# Patient Record
Sex: MALE | Race: AMERICAN INDIAN OR ALASKA NATIVE
[De-identification: names, ages, dates, MRNs, and addresses within clinical notes are randomized per-mention and may not be internally consistent; named-entity substitution may affect disease eponyms.]

---

## 2022-04-30 ENCOUNTER — HOSPITAL ENCOUNTER (EMERGENCY)
Dept: HOSPITAL 5 - ED | Age: 52
LOS: 1 days | Discharge: LEFT BEFORE DISCHARGE | End: 2022-05-01
Payer: MEDICARE

## 2022-04-30 VITALS — SYSTOLIC BLOOD PRESSURE: 165 MMHG | DIASTOLIC BLOOD PRESSURE: 89 MMHG

## 2022-04-30 DIAGNOSIS — M79.641: Primary | ICD-10-CM

## 2022-04-30 DIAGNOSIS — Z53.21: ICD-10-CM

## 2022-08-12 ENCOUNTER — HOSPITAL ENCOUNTER (INPATIENT)
Dept: HOSPITAL 5 - ED | Age: 52
LOS: 14 days | Discharge: HOME HEALTH | DRG: 70 | End: 2022-08-26
Attending: INTERNAL MEDICINE | Admitting: HOSPITALIST
Payer: MEDICARE

## 2022-08-12 DIAGNOSIS — Z82.49: ICD-10-CM

## 2022-08-12 DIAGNOSIS — Z79.82: ICD-10-CM

## 2022-08-12 DIAGNOSIS — E87.5: ICD-10-CM

## 2022-08-12 DIAGNOSIS — E86.0: ICD-10-CM

## 2022-08-12 DIAGNOSIS — Z20.822: ICD-10-CM

## 2022-08-12 DIAGNOSIS — Z99.2: ICD-10-CM

## 2022-08-12 DIAGNOSIS — E43: ICD-10-CM

## 2022-08-12 DIAGNOSIS — G93.41: Primary | ICD-10-CM

## 2022-08-12 DIAGNOSIS — E87.2: ICD-10-CM

## 2022-08-12 DIAGNOSIS — E87.0: ICD-10-CM

## 2022-08-12 DIAGNOSIS — I12.0: ICD-10-CM

## 2022-08-12 DIAGNOSIS — E11.22: ICD-10-CM

## 2022-08-12 DIAGNOSIS — Z91.19: ICD-10-CM

## 2022-08-12 DIAGNOSIS — D63.1: ICD-10-CM

## 2022-08-12 DIAGNOSIS — G40.909: ICD-10-CM

## 2022-08-12 DIAGNOSIS — N18.6: ICD-10-CM

## 2022-08-12 PROCEDURE — 80074 ACUTE HEPATITIS PANEL: CPT

## 2022-08-12 PROCEDURE — 86900 BLOOD TYPING SEROLOGIC ABO: CPT

## 2022-08-12 PROCEDURE — 80061 LIPID PANEL: CPT

## 2022-08-12 PROCEDURE — 82962 GLUCOSE BLOOD TEST: CPT

## 2022-08-12 PROCEDURE — 85014 HEMATOCRIT: CPT

## 2022-08-12 PROCEDURE — 82550 ASSAY OF CK (CPK): CPT

## 2022-08-12 PROCEDURE — 85025 COMPLETE CBC W/AUTO DIFF WBC: CPT

## 2022-08-12 PROCEDURE — 82140 ASSAY OF AMMONIA: CPT

## 2022-08-12 PROCEDURE — 86920 COMPATIBILITY TEST SPIN: CPT

## 2022-08-12 PROCEDURE — 86850 RBC ANTIBODY SCREEN: CPT

## 2022-08-12 PROCEDURE — 84484 ASSAY OF TROPONIN QUANT: CPT

## 2022-08-12 PROCEDURE — 93005 ELECTROCARDIOGRAM TRACING: CPT

## 2022-08-12 PROCEDURE — 80048 BASIC METABOLIC PNL TOTAL CA: CPT

## 2022-08-12 PROCEDURE — 86901 BLOOD TYPING SEROLOGIC RH(D): CPT

## 2022-08-12 PROCEDURE — 85610 PROTHROMBIN TIME: CPT

## 2022-08-12 PROCEDURE — 36415 COLL VENOUS BLD VENIPUNCTURE: CPT

## 2022-08-12 PROCEDURE — 80320 DRUG SCREEN QUANTALCOHOLS: CPT

## 2022-08-12 PROCEDURE — P9016 RBC LEUKOCYTES REDUCED: HCPCS

## 2022-08-12 PROCEDURE — G0480 DRUG TEST DEF 1-7 CLASSES: HCPCS

## 2022-08-12 PROCEDURE — 71045 X-RAY EXAM CHEST 1 VIEW: CPT

## 2022-08-12 PROCEDURE — 85018 HEMOGLOBIN: CPT

## 2022-08-12 PROCEDURE — 80053 COMPREHEN METABOLIC PANEL: CPT

## 2022-08-12 PROCEDURE — 83735 ASSAY OF MAGNESIUM: CPT

## 2022-08-12 PROCEDURE — U0003 INFECTIOUS AGENT DETECTION BY NUCLEIC ACID (DNA OR RNA); SEVERE ACUTE RESPIRATORY SYNDROME CORONAVIRUS 2 (SARS-COV-2) (CORONAVIRUS DISEASE [COVID-19]), AMPLIFIED PROBE TECHNIQUE, MAKING USE OF HIGH THROUGHPUT TECHNOLOGIES AS DESCRIBED BY CMS-2020-01-R: HCPCS

## 2022-08-12 PROCEDURE — 84100 ASSAY OF PHOSPHORUS: CPT

## 2022-08-12 PROCEDURE — 70450 CT HEAD/BRAIN W/O DYE: CPT

## 2022-08-13 LAB
ALBUMIN SERPL-MCNC: 2.6 G/DL (ref 3.9–5)
ALT SERPL-CCNC: 15 UNITS/L (ref 7–56)
BASOPHILS # (AUTO): 0 K/MM3 (ref 0–0.1)
BASOPHILS NFR BLD AUTO: 0.3 % (ref 0–1.8)
BUN SERPL-MCNC: 132 MG/DL (ref 9–20)
BUN SERPL-MCNC: 242 MG/DL (ref 9–20)
BUN/CREAT SERPL: 7 %
BUN/CREAT SERPL: 8 %
CALCIUM SERPL-MCNC: 10.5 MG/DL (ref 8.4–10.2)
CALCIUM SERPL-MCNC: 9.2 MG/DL (ref 8.4–10.2)
EOSINOPHIL # BLD AUTO: 0 K/MM3 (ref 0–0.4)
EOSINOPHIL NFR BLD AUTO: 0.1 % (ref 0–4.3)
HCT VFR BLD CALC: 24 % (ref 35.5–45.6)
HDLC SERPL-MCNC: 39 MG/DL (ref 40–59)
HEMOLYSIS INDEX: 0
HEMOLYSIS INDEX: 0
HGB BLD-MCNC: 7.8 GM/DL (ref 11.8–15.2)
INR PPP: 1.23 (ref 0.87–1.13)
LYMPHOCYTES # BLD AUTO: 0.3 K/MM3 (ref 1.2–5.4)
LYMPHOCYTES NFR BLD AUTO: 2.1 % (ref 13.4–35)
MCHC RBC AUTO-ENTMCNC: 32 % (ref 32–34)
MCV RBC AUTO: 93 FL (ref 84–94)
MONOCYTES # (AUTO): 1.1 K/MM3 (ref 0–0.8)
MONOCYTES % (AUTO): 8.3 % (ref 0–7.3)
PLATELET # BLD: 295 K/MM3 (ref 140–440)
RBC # BLD AUTO: 2.58 M/MM3 (ref 3.65–5.03)

## 2022-08-13 PROCEDURE — 5A1D70Z PERFORMANCE OF URINARY FILTRATION, INTERMITTENT, LESS THAN 6 HOURS PER DAY: ICD-10-PCS | Performed by: INTERNAL MEDICINE

## 2022-08-13 RX ADMIN — HEPARIN SODIUM SCH UNIT: 5000 INJECTION, SOLUTION INTRAVENOUS; SUBCUTANEOUS at 21:53

## 2022-08-13 RX ADMIN — Medication SCH ML: at 21:54

## 2022-08-13 NOTE — CONSULTATION
History of Present Illness





- Reason for Consult


Consult date: 08/13/22


end stage renal disease





- History of Present Illness





51yr M admitted with AMS, oriented to name only, only nods to answer questions 

but won't respond to commands. Does not know when he last had dialysis & unable 

to say his Clinic or Nephrologist








Past History


Past Medical History: ESRD, other (No further info available at this time & no 

family members around)





Medications and Allergies


                                    Allergies











Allergy/AdvReac Type Severity Reaction Status Date / Time


 


No Known Allergies Allergy   Unverified 06/07/22 18:10











                                Home Medications











 Medication  Instructions  Recorded  Confirmed  Last Taken  Type


 


Aspirin EC [Halfprin EC] 81 mg PO QDAY #30 tablet. 06/13/22  Unknown Rx


 


Metoprolol [Lopressor TAB] 100 mg PO BID #60 tablet 06/13/22  Unknown Rx


 


levETIRAcetam [Keppra TAB] 500 mg PO BID #60 tablet 06/13/22  Unknown Rx











Active Meds: 


Active Medications





Sodium Chloride (Nacl 0.9%)  100 mls @ 999 mls/hr IV EUSEBIA PRN


   PRN Reason: Hypotension











Review of Systems


ROS unobtainable: due to mental status





Exam





- Vital Signs


Vital signs: 


                                   Vital Signs











Temp Pulse Resp BP Pulse Ox


 


 98 F   99 H  18   165/89   100 


 


 08/12/22 23:33  08/12/22 23:33  08/12/22 23:33  08/12/22 23:33  08/12/22 23:33














- General Appearance


General appearance: other (Awake & alert but remains mute & only nods to answer 

questions)


EENT: PERRL, hearing intact


Neck: Present: neck supple.  Absent: JVD/HJR


Respiratory: Other (Good air entry)


Heart: regular, S1S2


Gastrointestinal: Present: other (Soft).  Absent: tenderness


Integumentary: no rash, warm and dry





Results





- Lab Results





                                 08/13/22 01:28





                                 08/13/22 01:28


                             Most recent lab results











Calcium  10.5 mg/dL (8.4-10.2)  H  08/13/22  01:28    














Assessment and Plan





ESRD - Highly elevated BUN/Cr but pt alert & no evidence of Uremic frost





Electrolyte Imbalance - HyperK, HyperNa





Dehydration 





Altered Mental Status - Possibly related to HyperNa but r/o illicit Drug use 





Plan:





HD today via Lt arm AVG with +Bruit & thrill. Discussed with HD Nurse for slow 

HD with adjusted  electrolytes to avoid Dialysis dysequilibrium





Low K, low Na, low Ca baths on HD. Kayexalate po





Hypotonic IVF & f/u labs & clinical status in am





Thanks, will f/u with you

## 2022-08-13 NOTE — EMERGENCY DEPARTMENT REPORT
ED Altered Mental Status HPI





- General


Chief Complaint: Altered Mental Status


Stated Complaint: AMS


PUI?: No


Time Seen by Provider: 08/13/22 00:42


Source: family, EMS


Mode of arrival: Stretcher


Limitations: Altered Mental Status





- History of Present Illness


Initial Comments: 





Family called to report that pt is not acting right. Oriented to name only. Does

not know when last dialysis was. Was given 1 amp BICARB PTA.


ps is 51 years old with CRF , family noted not acting like himself , they dont 

know when he was last dilaised, 


MD Complaint: altered mental status, confusion, decreased responsiveness


-: Gradual, days(s)


Severity: severe


Consistency of Symptoms: getting worse


Context: history of similar presen


Associated Symptoms: denies: denies other symptoms, chest pain





- Related Data


                                  Previous Rx's











 Medication  Instructions  Recorded  Last Taken  Type


 


Aspirin EC [Halfprin EC] 81 mg PO QDAY #30 tablet. 06/13/22 Unknown Rx


 


Metoprolol [Lopressor TAB] 100 mg PO BID #60 tablet 06/13/22 Unknown Rx


 


levETIRAcetam [Keppra TAB] 500 mg PO BID #60 tablet 06/13/22 Unknown Rx











                                    Allergies











Allergy/AdvReac Type Severity Reaction Status Date / Time


 


No Known Allergies Allergy   Unverified 06/07/22 18:10














ED Review of Systems


ROS: 


Stated complaint: AMS


Other details as noted in HPI





Constitutional: denies: chills, fever


Eyes: denies: eye pain, eye discharge, vision change


ENT: denies: ear pain, throat pain


Respiratory: denies: cough, shortness of breath, wheezing


Cardiovascular: denies: chest pain, palpitations


Endocrine: no symptoms reported


Gastrointestinal: denies: abdominal pain, nausea, diarrhea


Genitourinary: denies: urgency, dysuria


Musculoskeletal: denies: back pain, joint swelling, arthralgia


Skin: denies: rash, lesions


Neurological: denies: headache, weakness, paresthesias


Psychiatric: denies: anxiety, depression


Hematological/Lymphatic: denies: easy bleeding, easy bruising





ED Past Medical Hx





- Past Medical History


Previous Medical History?: Yes


Hx Hypertension: Yes


Hx Diabetes: Yes


Hx Renal Disease: Yes (On Dialysis)





- Surgical History


Past Surgical History?: No





- Social History


Smoking Status: Never Smoker


Substance Use Type: None





- Medications


Home Medications: 


                                Home Medications











 Medication  Instructions  Recorded  Confirmed  Last Taken  Type


 


Aspirin EC [Halfprin EC] 81 mg PO QDAY #30 tablet. 06/13/22  Unknown Rx


 


Metoprolol [Lopressor TAB] 100 mg PO BID #60 tablet 06/13/22  Unknown Rx


 


levETIRAcetam [Keppra TAB] 500 mg PO BID #60 tablet 06/13/22  Unknown Rx














ED Physical Exam





- General


Limitations: Altered Mental Status


General appearance: lethargic, in distress





- Head


Head exam: Present: atraumatic, normocephalic





- Eye


Eye exam: Present: normal appearance





- ENT


ENT exam: Present: mucous membranes moist





- Neck


Neck exam: Present: normal inspection





- Respiratory


Respiratory exam: Present: normal lung sounds bilaterally.  Absent: respiratory 

distress





- Cardiovascular


Cardiovascular Exam: Present: normal rhythm, tachycardia.  Absent: systolic 

murmur, diastolic murmur, rubs, gallop





- GI/Abdominal


GI/Abdominal exam: Present: soft, normal bowel sounds





- Rectal


Rectal exam: Present: deferred





- Extremities Exam


Extremities exam: Present: normal inspection





- Back Exam


Back exam: Present: normal inspection





- Skin


Skin exam: Present: warm, dry, intact, normal color.  Absent: rash





ED Course





                                   Vital Signs











  08/12/22





  23:33


 


Temperature 98 F


 


Pulse Rate 99 H


 


Respiratory 18





Rate 


 


Blood Pressure 165/89


 


O2 Sat by Pulse 100





Oximetry 














- Lab Data


Result diagrams: 


                                 08/13/22 01:28





                                 08/13/22 01:28





                                   Lab Results











  08/13/22 08/13/22 08/13/22 Range/Units





  01:28 01:28 01:28 


 


WBC  13.8 H    (4.5-11.0)  K/mm3


 


RBC  2.58 L    (3.65-5.03)  M/mm3


 


Hgb  7.8 L    (11.8-15.2)  gm/dl


 


Hct  24.0 L    (35.5-45.6)  %


 


MCV  93    (84-94)  fl


 


MCH  30    (28-32)  pg


 


MCHC  32    (32-34)  %


 


RDW  15.5 H    (13.2-15.2)  %


 


Plt Count  295    (140-440)  K/mm3


 


Lymph % (Auto)  2.1 L    (13.4-35.0)  %


 


Mono % (Auto)  8.3 H    (0.0-7.3)  %


 


Eos % (Auto)  0.1    (0.0-4.3)  %


 


Baso % (Auto)  0.3    (0.0-1.8)  %


 


Lymph # (Auto)  0.3 L    (1.2-5.4)  K/mm3


 


Mono # (Auto)  1.1 H    (0.0-0.8)  K/mm3


 


Eos # (Auto)  0.0    (0.0-0.4)  K/mm3


 


Baso # (Auto)  0.0    (0.0-0.1)  K/mm3


 


Seg Neutrophils %  89.2 H    (40.0-70.0)  %


 


Seg Neutrophils #  12.3 H    (1.8-7.7)  K/mm3


 


PT   17.0 H   (12.2-14.9)  Sec.


 


INR   1.23 H   (0.87-1.13)  


 


Sodium    152 H  (137-145)  mmol/L


 


Potassium    6.9 H*  (3.6-5.0)  mmol/L


 


Chloride    97.9 L  ()  mmol/L


 


Carbon Dioxide    21 L  (22-30)  mmol/L


 


Anion Gap    40  mmol/L


 


BUN    242 H  (9-20)  mg/dL


 


Creatinine    32.1 H  (0.8-1.3)  mg/dL


 


Estimated GFR    2  ml/min


 


BUN/Creatinine Ratio    8  %


 


Glucose    112 H  ()  mg/dL


 


Calcium    10.5 H  (8.4-10.2)  mg/dL


 


Total Bilirubin    0.30  (0.1-1.2)  mg/dL


 


AST    19  (5-40)  units/L


 


ALT    15  (7-56)  units/L


 


Alkaline Phosphatase    63  ()  units/L


 


Total Creatine Kinase    1147 H  ()  units/L


 


Troponin T    0.933 H*  (0.00-0.029)  ng/mL


 


Total Protein    7.1  (6.3-8.2)  g/dL


 


Albumin    2.6 L  (3.9-5)  g/dL


 


Albumin/Globulin Ratio    0.6  %


 


Salicylates     (2.8-20.0)  mg/dL


 


Acetaminophen     (10.0-30.0)  ug/mL


 


Plasma/Serum Alcohol     (0-0.07)  %














  08/13/22 08/13/22 08/13/22 Range/Units





  01:28 01:28 01:28 


 


WBC     (4.5-11.0)  K/mm3


 


RBC     (3.65-5.03)  M/mm3


 


Hgb     (11.8-15.2)  gm/dl


 


Hct     (35.5-45.6)  %


 


MCV     (84-94)  fl


 


MCH     (28-32)  pg


 


MCHC     (32-34)  %


 


RDW     (13.2-15.2)  %


 


Plt Count     (140-440)  K/mm3


 


Lymph % (Auto)     (13.4-35.0)  %


 


Mono % (Auto)     (0.0-7.3)  %


 


Eos % (Auto)     (0.0-4.3)  %


 


Baso % (Auto)     (0.0-1.8)  %


 


Lymph # (Auto)     (1.2-5.4)  K/mm3


 


Mono # (Auto)     (0.0-0.8)  K/mm3


 


Eos # (Auto)     (0.0-0.4)  K/mm3


 


Baso # (Auto)     (0.0-0.1)  K/mm3


 


Seg Neutrophils %     (40.0-70.0)  %


 


Seg Neutrophils #     (1.8-7.7)  K/mm3


 


PT     (12.2-14.9)  Sec.


 


INR     (0.87-1.13)  


 


Sodium     (137-145)  mmol/L


 


Potassium     (3.6-5.0)  mmol/L


 


Chloride     ()  mmol/L


 


Carbon Dioxide     (22-30)  mmol/L


 


Anion Gap     mmol/L


 


BUN     (9-20)  mg/dL


 


Creatinine     (0.8-1.3)  mg/dL


 


Estimated GFR     ml/min


 


BUN/Creatinine Ratio     %


 


Glucose     ()  mg/dL


 


Calcium     (8.4-10.2)  mg/dL


 


Total Bilirubin     (0.1-1.2)  mg/dL


 


AST     (5-40)  units/L


 


ALT     (7-56)  units/L


 


Alkaline Phosphatase     ()  units/L


 


Total Creatine Kinase     ()  units/L


 


Troponin T     (0.00-0.029)  ng/mL


 


Total Protein     (6.3-8.2)  g/dL


 


Albumin     (3.9-5)  g/dL


 


Albumin/Globulin Ratio     %


 


Salicylates  < 0.3 L    (2.8-20.0)  mg/dL


 


Acetaminophen   5.0 L   (10.0-30.0)  ug/mL


 


Plasma/Serum Alcohol    < 0.01  (0-0.07)  %














- EKG Data


-: EKG Interpreted by Me


EKG shows normal: sinus rhythm


Rate: tachycardia





- Radiology Data


Radiology results: report reviewed, image reviewed





- Medical Decision Making





work up showed hyperkalemia and uremia encephaolthy , protocol for hyperkalemia 

apliued, spoke with dr downs , will get him dilaised rigth away, will admit 


Critical Care Time: Yes


Critical care time in (mins) excluding proc time.: 65


Critical care attestation.: 


If time is entered above; I have spent that time in minutes in the direct care 

of this critically ill patient, excluding procedure time.








ED Disposition


Clinical Impression: 


 Uremic encephalopathy, AMS (altered mental status), Hyperkalemia





Disposition: 09 ADMITTED AS INPATIENT


Is pt being admited?: Yes


Does the pt Need Aspirin: No


Condition: Critical


Referrals: 


ARIES HARRINGTON MD [Primary Care Provider] - 3-5 Days

## 2022-08-13 NOTE — XRAY REPORT
CHEST 1 VIEW 8/12/2022 11:59 PM



INDICATION / CLINICAL INFORMATION: Altered Mental Status.



COMPARISON: 6/10/2022



FINDINGS:



Diffuse bilateral pulmonary opacities of increased since prior examination suggesting diffuse edema/p
neumonia. Left upper lung nodular density and opacity is also seen which may be slightly increased. F
ollow-up with CT recommended as previously described.



Signer Name: Andrea Greer MD 

Signed: 8/13/2022 1:30 AM

Workstation Name: [email or web address]-

## 2022-08-13 NOTE — CAT SCAN REPORT
CT HEAD WITHOUT CONTRAST



INDICATION / CLINICAL INFORMATION:

Altered Mental Status.



TECHNIQUE:

All CT scans at this location are performed using CT dose reduction for ALARA by means of automated e
xposure control. 



COMPARISON:

6/12/2022



FINDINGS:



There is a prominent area of low attenuation within the left frontal white matter consistent with are
a of prior ischemic change. Additional areas of low-attenuation scattered throughout the periventricu
lar and central white matter. Additional area of low-attenuation with in the left posterior occipital
 and periventricular white matter surrounding the occipital horn suggests prior ischemic change. Mult
iple areas of encephalomalacia are seen. Diffuse cerebral atrophy is identified. No acute hemorrhage 
is seen.. No midline shift or mass effect. Tiny area of punctate density is seen within the left post
erior parieto-occipital lobe on axial image 21



ADDITIONAL FINDINGS: None.



IMPRESSION:

1. Multiple areas of encephalomalacia with diffuse areas of prior ischemic change. Diffuse periventri
cular central white matter areas of low-attenuation suggesting chronic small vessel disease.

2. Punctate area of increased density in the left prior occipital lobe best seen on axial image 21. T
his could represent focal area of calcification however a small punctate images not completely exclud
ed. This is not as well-seen on the prior exam.



Called to Dr Humphrey [id number]



Signer Name: Andrea Greer MD 

Signed: 8/13/2022 1:53 AM

Workstation Name: [email or web address]-

## 2022-08-13 NOTE — EVENT NOTE
In reviewing chart, patient seen by Dr. Rodríguez on last visit- will change 

consult to him.  Did speak with Dr. Rodríguez to confirm patient is under his care.

 STAT HD orders placed given severity of symptoms early this AM, is currently on

HD.  Further management per Dr. Rodríguez.

## 2022-08-14 LAB
ALBUMIN SERPL-MCNC: 2.8 G/DL (ref 3.9–5)
ALT SERPL-CCNC: 17 UNITS/L (ref 7–56)
BASOPHILS # (AUTO): 0 K/MM3 (ref 0–0.1)
BASOPHILS NFR BLD AUTO: 0.4 % (ref 0–1.8)
BUN SERPL-MCNC: 138 MG/DL (ref 9–20)
BUN/CREAT SERPL: 7 %
CALCIUM SERPL-MCNC: 9.5 MG/DL (ref 8.4–10.2)
EOSINOPHIL # BLD AUTO: 0.1 K/MM3 (ref 0–0.4)
EOSINOPHIL NFR BLD AUTO: 1 % (ref 0–4.3)
HCT VFR BLD CALC: 19.9 % (ref 35.5–45.6)
HEMOLYSIS INDEX: 0
HGB BLD-MCNC: 6.8 GM/DL (ref 11.8–15.2)
LYMPHOCYTES # BLD AUTO: 0.3 K/MM3 (ref 1.2–5.4)
LYMPHOCYTES NFR BLD AUTO: 3.5 % (ref 13.4–35)
MCHC RBC AUTO-ENTMCNC: 34 % (ref 32–34)
MCV RBC AUTO: 92 FL (ref 84–94)
MONOCYTES # (AUTO): 0.8 K/MM3 (ref 0–0.8)
MONOCYTES % (AUTO): 8.7 % (ref 0–7.3)
PLATELET # BLD: 248 K/MM3 (ref 140–440)
RBC # BLD AUTO: 2.16 M/MM3 (ref 3.65–5.03)

## 2022-08-14 PROCEDURE — 30243N1 TRANSFUSION OF NONAUTOLOGOUS RED BLOOD CELLS INTO CENTRAL VEIN, PERCUTANEOUS APPROACH: ICD-10-PCS | Performed by: INTERNAL MEDICINE

## 2022-08-14 RX ADMIN — INSULIN LISPRO SCH UNIT: 100 INJECTION, SOLUTION INTRAVENOUS; SUBCUTANEOUS at 21:21

## 2022-08-14 RX ADMIN — ASPIRIN SCH MG: 81 TABLET, COATED ORAL at 10:12

## 2022-08-14 RX ADMIN — INSULIN LISPRO SCH: 100 INJECTION, SOLUTION INTRAVENOUS; SUBCUTANEOUS at 12:10

## 2022-08-14 RX ADMIN — SEVELAMER CARBONATE SCH: 800 TABLET, FILM COATED ORAL at 18:10

## 2022-08-14 RX ADMIN — Medication SCH ML: at 10:13

## 2022-08-14 RX ADMIN — METOPROLOL TARTRATE SCH MG: 100 TABLET, FILM COATED ORAL at 21:17

## 2022-08-14 RX ADMIN — SEVELAMER CARBONATE SCH: 800 TABLET, FILM COATED ORAL at 13:03

## 2022-08-14 RX ADMIN — METOPROLOL TARTRATE SCH MG: 100 TABLET, FILM COATED ORAL at 10:12

## 2022-08-14 RX ADMIN — SEVELAMER CARBONATE SCH MG: 800 TABLET, FILM COATED ORAL at 18:07

## 2022-08-14 RX ADMIN — HEPARIN SODIUM SCH UNIT: 5000 INJECTION, SOLUTION INTRAVENOUS; SUBCUTANEOUS at 10:13

## 2022-08-14 RX ADMIN — Medication SCH ML: at 21:17

## 2022-08-14 RX ADMIN — HEPARIN SODIUM SCH UNIT: 5000 INJECTION, SOLUTION INTRAVENOUS; SUBCUTANEOUS at 21:16

## 2022-08-14 RX ADMIN — INSULIN LISPRO SCH: 100 INJECTION, SOLUTION INTRAVENOUS; SUBCUTANEOUS at 20:25

## 2022-08-14 RX ADMIN — INSULIN LISPRO SCH: 100 INJECTION, SOLUTION INTRAVENOUS; SUBCUTANEOUS at 10:14

## 2022-08-14 NOTE — PROGRESS NOTE
Assessment and Plan





ESRD - Tolerated cautious HD yesterday, BUN/Cr improved from 242/32.1 to 

138/19.2, will f/u today & plan for next HD in am





Electrolyte Imbalance - Improved HyperNa from 152 to 147, resolved HyperK. En

courage free water ingestion. Low Na bath on HD 





Metab - Add Phos binder. Low Ca bath on HD





Altered Mental Status - Much improved, continue f/u








Subjective


Date of service: 08/14/22


Interval history: 





Pt more alert, lucid & verbally responsive. Knows he gets HD q mwf but has not 

been for a while





Objective





- Vital Signs


Vital signs: 


                               Vital Signs - 12hr











  08/14/22 08/14/22 08/14/22





  00:00 01:00 04:00


 


Temperature 99.8 F H  


 


Pulse Rate [  106 H 90





From Monitor]   


 


Blood Pressure   


 


O2 Sat by Pulse  100 100





Oximetry   














  08/14/22 08/14/22 08/14/22





  04:36 07:37 10:12


 


Temperature 97.5 F L 98.7 F 


 


Pulse Rate [   





From Monitor]   


 


Blood Pressure   141/70


 


O2 Sat by Pulse   





Oximetry   














- General Appearance


General appearance: other (Awake & now verbally responsive)


EENT: PERRL, hearing intact


Neck: no JVD


Respiratory: Present: Other (Good air entry)


Cardiology: regular, S1S2


Gastrointestinal: normal


Neurologic: alert and oriented x3





- Lab





                                 08/14/22 07:22





                                 08/14/22 07:22


                             Most recent lab results











Calcium  9.5 mg/dL (8.4-10.2)   08/14/22  07:22    


 


Phosphorus  10.10 mg/dL (2.5-4.5)  H  08/14/22  07:22    


 


Magnesium  2.20 mg/dL (1.7-2.3)   08/14/22  07:22    














Medications & Allergies





- Medications


Allergies/Adverse Reactions: 


                                    Allergies





No Known Allergies Allergy (Unverified 06/07/22 18:10)


   








Home Medications: 


                                Home Medications











 Medication  Instructions  Recorded  Confirmed  Last Taken  Type


 


Aspirin EC [Halfprin EC] 81 mg PO QDAY #30 tablet. 06/13/22  Unknown Rx


 


Metoprolol [Lopressor TAB] 100 mg PO BID #60 tablet 06/13/22  Unknown Rx


 


levETIRAcetam [Keppra TAB] 500 mg PO BID #60 tablet 06/13/22  Unknown Rx











Active Medications: 














Generic Name Dose Route Start Last Admin





  Trade Name Freq  PRN Reason Stop Dose Admin


 


Acetaminophen  650 mg  08/13/22 19:48 





  Acetaminophen 325 Mg Tab  PO  





  Q4H PRN  





  Pain MILD(1-3)/Fever >100.5/HA  


 


Aspirin  81 mg  08/14/22 10:00  08/14/22 10:12





  Aspirin Ec 81 Mg Tab  PO   81 mg





  QDAY [facility]   Administration


 


Heparin Sodium (Porcine)  5,000 unit  08/13/22 22:00  08/14/22 10:13





  Heparin 5,000 Unit/1 Ml Vial  SUB-Q   5,000 unit





  Q12HR [facility]   Administration


 


Hydromorphone HCl  0.5 mg  08/13/22 19:48 





  Hydromorphone 0.5 Mg/0.5 Ml Inj  IV  





  Q3H PRN  





  Pain , Severe (7-10)  


 


Sodium Chloride  100 mls @ 999 mls/hr  08/13/22 04:18 





  Nacl 0.9%  IV  





  EUSEBIA PRN  





  Hypotension  


 


Sodium Chloride  500 mls @ 0 mls/hr  08/14/22 09:00 





  Nacl 0.9% 500 Ml  IV  08/14/22 19:00 





  ONCE@0900 [facility]  





  As Directed  


 


Insulin Human Lispro  0 unit  08/14/22 07:30  08/14/22 10:14





  Insulin Lispro 100 Unit/Ml  SUB-Q   Not Given





  [facility]  





  Protocol  


 


Levetiracetam  500 mg  08/14/22 10:00  08/14/22 10:12





  Levetiracetam 500 Mg Tab  PO   500 mg





  BID [facility]   Administration


 


Metoclopramide HCl  10 mg  08/13/22 19:48 





  Metoclopramide 10 Mg/2 Ml Inj  IV  





  Q6H PRN  





  Nausea And Vomiting  


 


Metoprolol Tartrate  100 mg  08/14/22 10:00  08/14/22 10:12





  Metoprolol Tartrate 100 Mg Tab  PO   100 mg





  BID [facility]   Administration


 


Morphine Sulfate  2 mg  08/13/22 19:48 





  Morphine 2 Mg/1 Ml Inj  IV  





  Q4H PRN  





  Pain, Moderate (4-6)  


 


Ondansetron HCl  4 mg  08/13/22 19:48 





  Ondansetron 4 Mg/2 Ml Inj  IV  





  Q8H PRN  





  Nausea And Vomiting  


 


Oxycodone/Acetaminophen  1 tab  08/13/22 19:48 





  Oxycodone /Acetaminophen 5-325mg Tab  PO  





  Q6H PRN  





  Pain, Moderate (4-6)  


 


Sodium Chloride  10 ml  08/13/22 22:00  08/14/22 10:13





  Sodium Chloride 0.9% 10 Ml Flush Syringe  IV   10 ml





  BID [facility]   Administration


 


Sodium Chloride  10 ml  08/13/22 19:48 





  Sodium Chloride 0.9% 10 Ml Flush Syringe  IV  





  PRN PRN  





  LINE FLUSH

## 2022-08-14 NOTE — PROGRESS NOTE
Assessment and Plan


Assessment and plan: 


History of present illness: 


51-year-old  seizure disorder hypertension type 2 diabetes and end-stage 

renal disease on hemodialysis presents with lethargy and confusion.  Family 

brought him to the emergency room stating that he is confused not oriented.  His

last known dialysis is not known.  Improvement on fluids.  No fever or chills.


No seizures.  Noncompliant with his medications.





hospital course:


8/14: AOx4 on bedside encounter. Patient stated he has now missed several 

dialysis sessions. He goes to [facility] by [place] per patient. will place 

CM consultation to ensure patient still has chair. Anticipate d/c tomorrow.





Assessment and Plan:


(1) Acute metabolic encephalopathy


Current Visit: Yes   Status: Acute   


Plan to address problem: 


Secondary to severe uremia.  His creatinine is 32.1 and BUN is 242.  Family does

not know how many days he has missed his hemodialysis.  I tried to get 

information whether he lives alone.  Could not reach anybody.  Patient needs m

ultiple hemodialysis treatments to bring the creatinine down to reasonable 

levels.  Nephrology consulted.  Patient to get emergent hemodialysis.  Today.


Patient to be counseled about compliance once he  is more alert and oriented.








(2) Hyperkalemia


Current Visit: Yes   Status: Acute   


Plan to address problem: 


Treated in the emergency room and patient going for hemodialysis


Recheck potassium level








(3) Metabolic acidosis


Current Visit: Yes   Status: Acute   


Plan to address problem: 


Secondary to severe uremia.








(4) Seizure disorder


Current Visit: Yes   Status: Chronic   


Plan to address problem: 


Continue Keppra.








(5) T2DM (type 2 diabetes mellitus)


Current Visit: Yes   Status: Chronic   


Qualifiers: 


   Diabetes mellitus long term insulin use: unspecified long term insulin use 

status   Chronic kidney disease stage: on chronic dialysis 


Plan to address problem: 


Coverage for now








(6) ESRD needing dialysis


Current Visit: Yes   Status: Acute   


Plan to address problem: 


Emergent hemodialysis


Nephrology consult


Patient to be counseled about compliance








(7) Anemia


Current Visit: No   Status: Chronic   


Qualifiers: 


   Anemia type: due to chronic kidney disease 


Plan to address problem: 


Secondary to chronic kidney disease


Epogen as per nephrology








(8) Hypertension


Current Visit: Yes   Status: Chronic   


Qualifiers: 


   Hypertension type: primary hypertension   Qualified Code(s): I10 - Essential 

(primary) hypertension   


Plan to address problem: 


Continue antihypertensives








(9) Malnutrition


Current Visit: Yes   Status: Chronic   


Qualifiers: 


   Malnutrition type: protein-calorie malnutrition   Protein-calorie 

malnutrition severity: severe   Qualified Code(s): E43 - Unspecified severe pr

otein-calorie malnutrition   


Plan to address problem: 


Dietary supplements initiated


Dietitian consult requested








(10) DVT prophylaxis


Current Visit: Yes   Status: Acute   


Plan to address problem: 


On heparin and GI prophylaxis








(11) Advance care planning


Current Visit: Yes   Status: Acute   


Plan to address problem: 


Disease education conducted, care plan discussed, diagnosis discussed and p

rognosis discussed.  Patient acknowledged understanding with care plan.  +30 

minutes.





The high probability of a clinically significant, sudden or life threatening 

deterioration of the [multi] system(s) required my full and direct attention, 

intervention and personal management. The aggregate critical care time was [60] 

minutes. This time is in addition to time spent performing reported procedures 

but includes the following: 





[x] Data Review and interpretation 





[x] Patient assessment and monitoring of vital signs 





[x] Documentation 





[x] Medication orders and management





History


Interval history: 





No acute complaints on bedside encounter.





Hospitalist Physical





- Physical exam


Narrative exam: 





Physical Exam:


VITAL SIGNS:  Reviewed.    


GENERAL:  The patient appears normally developed, Vital signs as documented.


HEAD:  No signs of head trauma.


EYES:  Pupils are equal.  Extraocular motions intact.  


EARS:  Hearing grossly intact.


MOUTH:  Oropharynx is normal. 


NECK:  No adenopathy, no JVD.  


CHEST:  Chest with clear breath sounds bilaterally.  No wheezes, rales, or 

rhonchi.  


CARDIAC:  Regular rate and rhythm.  S1 and S2, without murmurs, gallops, or 

rubs.


VASCULAR:  No Edema.  Peripheral pulses normal and equal in all extremities.


ABDOMEN:  Soft, non tender and non distended.  No   rebound or guarding, and no 

masses palpated.   Bowel Sounds normal.


MUSCULOSKELETAL:  Good range of motion of all major joints. Extremities without 

clubbing, cyanosis or edema.  


NEUROLOGIC EXAM:  Alert and oriented x 4. no focal sensory or strength deficits.

  


PSYCHIATRIC:  Mood normal.


SKIN:  detail exam as documented in skin assessment





- Constitutional


Vitals: 


                                        











Temp Pulse Resp BP Pulse Ox


 


 98.7 F   90   26 H  112/71   100 


 


 08/14/22 07:37  08/14/22 04:00  08/13/22 18:00  08/13/22 18:00  08/14/22 04:00











General appearance: Present: no acute distress, well-nourished





HEART Score





- HEART Score


Troponin: 


                                        











Troponin T  0.933 ng/mL (0.00-0.029)  H*  08/13/22  01:28    














Results





- Labs


CBC & Chem 7: 


                                 08/14/22 07:22





                                 08/14/22 07:22


Labs: 


                             Laboratory Last Values











WBC  9.1 K/mm3 (4.5-11.0)   08/14/22  07:22    


 


RBC  2.16 M/mm3 (3.65-5.03)  L  08/14/22  07:22    


 


Hgb  6.8 gm/dl (11.8-15.2)  L  08/14/22  07:22    


 


Hct  19.9 % (35.5-45.6)  L*  08/14/22  07:22    


 


MCV  92 fl (84-94)   08/14/22  07:22    


 


MCH  31 pg (28-32)   08/14/22  07:22    


 


MCHC  34 % (32-34)   08/14/22  07:22    


 


RDW  15.0 % (13.2-15.2)   08/14/22 07:22    


 


Plt Count  248 K/mm3 (140-440)   08/14/22  07:22    


 


Lymph % (Auto)  3.5 % (13.4-35.0)  L  08/14/22  07:22    


 


Mono % (Auto)  8.7 % (0.0-7.3)  H  08/14/22  07:22    


 


Eos % (Auto)  1.0 % (0.0-4.3)   08/14/22  07:22    


 


Baso % (Auto)  0.4 % (0.0-1.8)   08/14/22 07:22    


 


Lymph # (Auto)  0.3 K/mm3 (1.2-5.4)  L  08/14/22 07:22    


 


Mono # (Auto)  0.8 K/mm3 (0.0-0.8)   08/14/22 07:22    


 


Eos # (Auto)  0.1 K/mm3 (0.0-0.4)   08/14/22  07:22    


 


Baso # (Auto)  0.0 K/mm3 (0.0-0.1)   08/14/22  07:22    


 


Seg Neutrophils %  86.4 % (40.0-70.0)  H  08/14/22  07:22    


 


Seg Neutrophils #  7.9 K/mm3 (1.8-7.7)  H  08/14/22  07:22    


 


PT  17.0 Sec. (12.2-14.9)  H  08/13/22  01:28    


 


INR  1.23  (0.87-1.13)  H  08/13/22  01:28    


 


Sodium  147 mmol/L (137-145)  H  08/14/22  07:22    


 


Potassium  4.8 mmol/L (3.6-5.0)   08/14/22  07:22    


 


Chloride  97.6 mmol/L ()  L  08/14/22  07:22    


 


Carbon Dioxide  27 mmol/L (22-30)   08/14/22  07:22    


 


Anion Gap  27 mmol/L  08/14/22  07:22    


 


BUN  138 mg/dL (9-20)  H  08/14/22  07:22    


 


Creatinine  19.2 mg/dL (0.8-1.3)  H  08/14/22  07:22    


 


Estimated GFR  3 ml/min  08/14/22  07:22    


 


BUN/Creatinine Ratio  7 %  08/14/22  07:22    


 


Glucose  118 mg/dL ()  H  08/14/22  07:22    


 


POC Glucose  109 mg/dL ()  H  08/14/22  07:51    


 


Calcium  9.5 mg/dL (8.4-10.2)   08/14/22  07:22    


 


Phosphorus  10.10 mg/dL (2.5-4.5)  H  08/14/22  07:22    


 


Magnesium  2.20 mg/dL (1.7-2.3)   08/14/22  07:22    


 


Total Bilirubin  0.30 mg/dL (0.1-1.2)   08/14/22  07:22    


 


AST  20 units/L (5-40)   08/14/22  07:22    


 


ALT  17 units/L (7-56)   08/14/22  07:22    


 


Alkaline Phosphatase  65 units/L ()   08/14/22  07:22    


 


Total Creatine Kinase  1147 units/L ()  H  08/13/22  01:28    


 


Troponin T  0.933 ng/mL (0.00-0.029)  H*  08/13/22  01:28    


 


Total Protein  5.8 g/dL (6.3-8.2)  L  08/14/22  07:22    


 


Albumin  2.8 g/dL (3.9-5)  L  08/14/22  07:22    


 


Albumin/Globulin Ratio  0.9 %  08/14/22  07:22    


 


Triglycerides  178 mg/dL (2-149)  H  08/13/22  01:28    


 


Cholesterol  112 mg/dL ()   08/13/22  01:28    


 


LDL Cholesterol Direct  30 mg/dL ()  L  08/13/22  01:28    


 


HDL Cholesterol  39 mg/dL (40-59)  L  08/13/22  01:28    


 


Cholesterol/HDL Ratio  2.87 %  08/13/22  01:28    


 


Salicylates  < 0.3 mg/dL (2.8-20.0)  L  08/13/22  01:28    


 


Acetaminophen  5.0 ug/mL (10.0-30.0)  L  08/13/22  01:28    


 


Plasma/Serum Alcohol  < 0.01 % (0-0.07)   08/13/22  01:28    


 


Hepatitis A IgM Ab  Non-reactive  (NonReactive)   08/13/22  01:28    


 


Hep Bs Antigen  Non-reactive  (Negative)   08/13/22  01:28    


 


Hep B Core IgM Ab  Non-reactive  (NonReactive)   08/13/22  01:28    


 


Hepatitis C Antibody  Non-reactive  (NonReactive)   08/13/22  01:28    











Petty/IV: 


                                        





Voiding Method                   Incontinent











Active Medications





- Current Medications


Current Medications: 














Generic Name Dose Route Start Last Admin





  Trade Name Freq  PRN Reason Stop Dose Admin


 


Acetaminophen  650 mg  08/13/22 19:48 





  Acetaminophen 325 Mg Tab  PO  





  Q4H PRN  





  Pain MILD(1-3)/Fever >100.5/HA  


 


Aspirin  81 mg  08/14/22 10:00 





  Aspirin Ec 81 Mg Tab  PO  





  QDAY LASHA  


 


Heparin Sodium (Porcine)  5,000 unit  08/13/22 22:00  08/13/22 21:53





  Heparin 5,000 Unit/1 Ml Vial  SUB-Q   5,000 unit





  Q12HR LASHA   Administration


 


Hydromorphone HCl  0.5 mg  08/13/22 19:48 





  Hydromorphone 0.5 Mg/0.5 Ml Inj  IV  





  Q3H PRN  





  Pain , Severe (7-10)  


 


Sodium Chloride  100 mls @ 999 mls/hr  08/13/22 04:18 





  Nacl 0.9%  IV  





  EUSEBIA PRN  





  Hypotension  


 


Sodium Chloride  500 mls @ 0 mls/hr  08/14/22 09:00 





  Nacl 0.9% 500 Ml  IV  08/14/22 19:00 





  ONCE@0900 [facility]  





  As Directed  


 


Insulin Human Lispro  0 unit  08/14/22 07:30 





  Insulin Lispro 100 Unit/Ml  SUB-Q  





  [facility]  





  Protocol  


 


Levetiracetam  500 mg  08/14/22 10:00 





  Levetiracetam 500 Mg Tab  PO  





  BID [facility]  


 


Metoclopramide HCl  10 mg  08/13/22 19:48 





  Metoclopramide 10 Mg/2 Ml Inj  IV  





  Q6H PRN  





  Nausea And Vomiting  


 


Metoprolol Tartrate  100 mg  08/14/22 10:00 





  Metoprolol Tartrate 100 Mg Tab  PO  





  BID [facility]  


 


Morphine Sulfate  2 mg  08/13/22 19:48 





  Morphine 2 Mg/1 Ml Inj  IV  





  Q4H PRN  





  Pain, Moderate (4-6)  


 


Ondansetron HCl  4 mg  08/13/22 19:48 





  Ondansetron 4 Mg/2 Ml Inj  IV  





  Q8H PRN  





  Nausea And Vomiting  


 


Oxycodone/Acetaminophen  1 tab  08/13/22 19:48 





  Oxycodone /Acetaminophen 5-325mg Tab  PO  





  Q6H PRN  





  Pain, Moderate (4-6)  


 


Sodium Chloride  10 ml  08/13/22 22:00  08/13/22 21:54





  Sodium Chloride 0.9% 10 Ml Flush Syringe  IV   10 ml





  BID [facility]   Administration


 


Sodium Chloride  10 ml  08/13/22 19:48 





  Sodium Chloride 0.9% 10 Ml Flush Syringe  IV  





  PRN PRN  





  LINE FLUSH

## 2022-08-14 NOTE — ELECTROCARDIOGRAPH REPORT
[facility]

                                       

Test Date:    2022               Test Time:    01:01:06

Pat Name:     FRED MITCHELL              Department:   

Patient ID:   [id number]          Room:         A267 1

Gender:       M                        Technician:   PATRICIA

:          1970               Requested By: SOFYA MENDES

Order Number: [id number]             Reading MD:   Saul Pierson

                                 Measurements

Intervals                              Axis          

Rate:         102                      P:            61

NY:           159                      QRS:          40

QRSD:         78                       T:            84

QT:           349                                    

QTc:          454                                    

                           Interpretive Statements

Sinus tachycardia

Atrial premature complex

Probable left atrial enlargement

Probable anteroseptal infarct, old

No previous ECG available for comparison

Electronically Signed On 2022 14:39:04 EDT by Saul Pierson

## 2022-08-14 NOTE — HISTORY AND PHYSICAL REPORT
History of Present Illness


Date of examination: 22


Date of admission: 


22 19:48





Chief complaint: 


Altered sensorium since a.m.





History of present illness: 


51-year-old  seizure disorder hypertension type 2 diabetes and end-stage 

renal disease on hemodialysis presents with lethargy and confusion.  Family 

brought him to the emergency room stating that he is confused not oriented.  His

last known dialysis is not known.  Improvement on fluids.  No fever or chills.


No seizures.  Noncompliant with his medications.














- Past Medical History


--Previous Medical History?: Yes


--Hypertension: Yes


--Diabetes: Yes


--Renal Disease: Yes (On Dialysis)





- Surgical History


--AVF for HD





- Social History 


--Smoking Status: Never Smoker


--Substance Use Type: None   





-Family history


-- Htn








- Medications


--Home Medications: 


                                Home Medications











 Medication  Instructions  Recorded  Confirmed  Last Taken  Type


 


Aspirin EC [Halfprin EC] 81 mg PO QDAY #30 tablet. 22  Unknown Rx


 


Metoprolol [Lopressor TAB] 100 mg PO BID #60 tablet 22  Unknown Rx


 


levETIRAcetam [Keppra TAB] 500 mg PO BID #60 tablet 22  Unknown Rx

















Review of Systems


ROS: 


Stated complaint: AMS


Other details as noted in HPI





Constitutional: denies: chills, fever


Eyes: denies: eye pain, eye discharge, vision change


ENT: denies: ear pain, throat pain


Respiratory: denies: cough, shortness of breath, wheezing


Cardiovascular: denies: chest pain, palpitations


Endocrine: no symptoms reported


Gastrointestinal: denies: abdominal pain, nausea, diarrhea


Genitourinary: denies: urgency, dysuria


Musculoskeletal: denies: back pain, joint swelling, arthralgia


Skin: denies: rash, lesions


Neurological: denies: headache, weakness, paresthesias


Psychiatric: denies: anxiety, depression


Hematological/Lymphatic: denies: easy bleeding, easy bruising




















Past History


Past Medical History: ESRD, other (No further info available at this time & no 

family members around)





Medications and Allergies


                                    Allergies











Allergy/AdvReac Type Severity Reaction Status Date / Time


 


No Known Allergies Allergy   Unverified 22 18:10











                                Home Medications











 Medication  Instructions  Recorded  Confirmed  Last Taken  Type


 


Aspirin EC [Halfprin EC] 81 mg PO QDAY #30 tablet. 22  Unknown Rx


 


Metoprolol [Lopressor TAB] 100 mg PO BID #60 tablet 22  Unknown Rx


 


levETIRAcetam [Keppra TAB] 500 mg PO BID #60 tablet 22  Unknown Rx











Active Meds: 


Active Medications





Acetaminophen (Acetaminophen 325 Mg Tab)  650 mg PO Q4H PRN


   PRN Reason: Pain MILD(1-3)/Fever >100.5/HA


Heparin Sodium (Porcine) (Heparin 5,000 Unit/1 Ml Vial)  5,000 unit SUB-Q Q12HR 

[facility]


   Last Admin: 22 21:53 Dose:  5,000 unit


   


Hydromorphone HCl (Hydromorphone 0.5 Mg/0.5 Ml Inj)  0.5 mg IV Q3H PRN


   PRN Reason: Pain , Severe (7-10)


Sodium Chloride (Nacl 0.9%)  100 mls @ 999 mls/hr IV EUSEBIA PRN


   PRN Reason: Hypotension


Metoclopramide HCl (Metoclopramide 10 Mg/2 Ml Inj)  10 mg IV Q6H PRN


   PRN Reason: Nausea And Vomiting


Morphine Sulfate (Morphine 2 Mg/1 Ml Inj)  2 mg IV Q4H PRN


   PRN Reason: Pain, Moderate (4-6)


Ondansetron HCl (Ondansetron 4 Mg/2 Ml Inj)  4 mg IV Q8H PRN


   PRN Reason: Nausea And Vomiting


Oxycodone/Acetaminophen (Oxycodone /Acetaminophen 5-325mg Tab)  1 tab PO Q6H PRN


   PRN Reason: Pain, Moderate (4-6)


Sodium Chloride (Sodium Chloride 0.9% 10 Ml Flush Syringe)  10 ml IV BID [facility]


   Last Admin: 22 21:54 Dose:  10 ml


   


Sodium Chloride (Sodium Chloride 0.9% 10 Ml Flush Syringe)  10 ml IV PRN PRN


   PRN Reason: LINE FLUSH











Exam





- Constitutional


Vitals: 


                                        











Temp Pulse Resp BP Pulse Ox


 


 99.8 F H  106 H  26 H  112/71   100 


 


 22 00:00  22 01:00  22 18:00  22 18:00  22 01:00











General appearance: Present: no acute distress, well-nourished





- EENT


Eyes: Present: PERRL


ENT: hearing intact, clear oral mucosa





- Neck


Neck: Present: supple, normal ROM





- Respiratory


Respiratory effort: normal


Respiratory: bilateral: CTA





- Cardiovascular


Heart rate: 78


Rhythm: regular


Heart Sounds: Present: S1 & S2.  Absent: rub, click





- Extremities


Extremities: pulses symmetrical, No edema


Peripheral Pulses: within normal limits





- Abdominal


General gastrointestinal: Present: soft, non-tender, non-distended, normal bowel

sounds


Male genitourinary: Present: normal





- Integumentary


Integumentary: Present: clear, warm, dry





- Musculoskeletal


Musculoskeletal: generalized weakness





- Psychiatric


Psychiatric: other (Alert but not oriented to time place and person)





- Neurologic


Neurologic: CNII-XII intact, moves all extremities, other (Alert but not o

riented to time place and person)





- Allied Health


Allied health notes reviewed: nursing, case management





HEART Score





- HEART Score


Troponin: 


                                        











Troponin T  0.933 ng/mL (0.00-0.029)  H*  22  01:28    














Results





- Labs


CBC & Chem 7: 


                                 22 01:28





                                 22 20:21


Labs: 


                             Laboratory Last Values











WBC  13.8 K/mm3 (4.5-11.0)  H  22  01:28    


 


RBC  2.58 M/mm3 (3.65-5.03)  L  22  01:28    


 


Hgb  7.8 gm/dl (11.8-15.2)  L  22  01:28    


 


Hct  24.0 % (35.5-45.6)  L  22  01:28    


 


MCV  93 fl (84-94)   22  01:28    


 


MCH  30 pg (28-32)   22  01:28    


 


MCHC  32 % (32-34)   22  01:28    


 


RDW  15.5 % (13.2-15.2)  H  22  01:28    


 


Plt Count  295 K/mm3 (140-440)   22  01:28    


 


Lymph % (Auto)  2.1 % (13.4-35.0)  L  22  01:28    


 


Mono % (Auto)  8.3 % (0.0-7.3)  H  22  01:28    


 


Eos % (Auto)  0.1 % (0.0-4.3)   22  01:28    


 


Baso % (Auto)  0.3 % (0.0-1.8)   22  01:28    


 


Lymph # (Auto)  0.3 K/mm3 (1.2-5.4)  L  22  01:28    


 


Mono # (Auto)  1.1 K/mm3 (0.0-0.8)  H  22  01:28    


 


Eos # (Auto)  0.0 K/mm3 (0.0-0.4)   22  01:28    


 


Baso # (Auto)  0.0 K/mm3 (0.0-0.1)   22  01:    


 


Seg Neutrophils %  89.2 % (40.0-70.0)  H  22:    


 


Seg Neutrophils #  12.3 K/mm3 (1.8-7.7)  H  22  01:28    


 


PT  17.0 Sec. (12.2-14.9)  H  22  01:28    


 


INR  1.23  (0.87-1.13)  H  22  01:28    


 


Sodium  146 mmol/L (137-145)  H  22  20:21    


 


Potassium  4.9 mmol/L (3.6-5.0)  D 22  20:    


 


Chloride  98.0 mmol/L ()   22  20:21    


 


Carbon Dioxide  27 mmol/L (22-30)   22  20:21    


 


Anion Gap  26 mmol/L  22  20:21    


 


BUN  132 mg/dL (9-20)  H  22  20:21    


 


Creatinine  18.5 mg/dL (0.8-1.3)  H  22  20:21    


 


Estimated GFR  3 ml/min  22  20:21    


 


BUN/Creatinine Ratio  7 %  22  20:    


 


Glucose  138 mg/dL ()  H  22  20:21    


 


Calcium  9.2 mg/dL (8.4-10.2)   22  20:21    


 


Total Bilirubin  0.30 mg/dL (0.1-1.2)   22:28    


 


AST  19 units/L (5-40)   22  01:28    


 


ALT  15 units/L (7-56)   22  01:28    


 


Alkaline Phosphatase  63 units/L ()   22  01:28    


 


Total Creatine Kinase  1147 units/L ()  H  22  01:28    


 


Troponin T  0.933 ng/mL (0.00-0.029)  H*  22  01:28    


 


Total Protein  7.1 g/dL (6.3-8.2)   22  01:28    


 


Albumin  2.6 g/dL (3.9-5)  L  22  01:28    


 


Albumin/Globulin Ratio  0.6 %  22  01:28    


 


Triglycerides  178 mg/dL (2-149)  H  22  01:28    


 


Cholesterol  112 mg/dL ()   22  01:28    


 


LDL Cholesterol Direct  30 mg/dL ()  L  22  01:28    


 


HDL Cholesterol  39 mg/dL (40-59)  L  22  01:28    


 


Cholesterol/HDL Ratio  2.87 %  22  01:28    


 


Salicylates  < 0.3 mg/dL (2.8-20.0)  L  22  01:28    


 


Acetaminophen  5.0 ug/mL (10.0-30.0)  L  22  01:28    


 


Plasma/Serum Alcohol  < 0.01 % (0-0.07)   22  01:28    


 


Hepatitis A IgM Ab  Non-reactive  (NonReactive)   22  01:28    


 


Hep Bs Antigen  Non-reactive  (Negative)   22  01:28    


 


Hep B Core IgM Ab  Non-reactive  (NonReactive)   22  01:28    


 


Hepatitis C Antibody  Non-reactive  (NonReactive)   22  01:28    








                                       BMP











  22





  20:21


 


Sodium  146 H


 


Potassium  4.9  D


 


Chloride  98.0


 


Carbon Dioxide  27


 


BUN  132 H


 


Creatinine  18.5 H


 


Glucose  138 H


 


Calcium  9.2











Petty/IV: 


                                        





Voiding Method                   Incontinent











Assessment and Plan


Assessment and plan: 


Critical care statement


The high probability OF a clinically significant sudden or life-threatening 

deterioration of the cardiorespiratory system and endocrine system required my 

full and direct attention, intervention and postoperative management.  The 

aggregate critical care time was 62 minutes.  The time is in addition to time 

spent performing reported procedures but includes the followin: Data review and interpretation


2: Patient assessment and monitoring of vital signs


3: Documentation


4:: Medication orders and management





Advance Directives: Yes (Full code)


VTE prophylaxis?: Chemical


Plan of care discussed with patient/family: Yes





- Patient Problems


(1) Acute metabolic encephalopathy


Current Visit: Yes   Status: Acute   


Plan to address problem: 


Secondary to severe uremia.  His creatinine is 32.1 and BUN is 242.  Family does

not know how many days he has missed his hemodialysis.  I tried to get 

information whether he lives alone.  Could not reach anybody.  Patient needs 

multiple hemodialysis treatments to bring the creatinine down to reasonable 

levels.  Nephrology consulted.  Patient to get emergent hemodialysis.  Today.


Patient to be counseled about compliance once he  is more alert and oriented.








(2) Hyperkalemia


Current Visit: Yes   Status: Acute   


Plan to address problem: 


Treated in the emergency room and patient going for hemodialysis


Recheck potassium level








(3) Metabolic acidosis


Current Visit: Yes   Status: Acute   


Plan to address problem: 


Secondary to severe uremia.








(4) Seizure disorder


Current Visit: Yes   Status: Chronic   


Plan to address problem: 


Continue Keppra.








(5) T2DM (type 2 diabetes mellitus)


Current Visit: Yes   Status: Chronic   


Qualifiers: 


   Diabetes mellitus long term insulin use: unspecified long term insulin use 

status   Chronic kidney disease stage: on chronic dialysis 


Plan to address problem: 


Coverage for now








(6) ESRD needing dialysis


Current Visit: Yes   Status: Acute   


Plan to address problem: 


Emergent hemodialysis


Nephrology consult


Patient to be counseled about compliance








(7) Anemia


Current Visit: No   Status: Chronic   


Qualifiers: 


   Anemia type: due to chronic kidney disease 


Plan to address problem: 


Secondary to chronic kidney disease


Epogen as per nephrology








(8) Hypertension


Current Visit: Yes   Status: Chronic   


Qualifiers: 


   Hypertension type: primary hypertension   Qualified Code(s): I10 - Essential 

(primary) hypertension   


Plan to address problem: 


Continue antihypertensives








(9) Malnutrition


Current Visit: Yes   Status: Chronic   


Qualifiers: 


   Malnutrition type: protein-calorie malnutrition   Protein-calorie 

malnutrition severity: severe   Qualified Code(s): E43 - Unspecified severe 

protein-calorie malnutrition   


Plan to address problem: 


Dietary supplements initiated


Dietitian consult requested








(10) DVT prophylaxis


Current Visit: Yes   Status: Acute   


Plan to address problem: 


On heparin and GI prophylaxis








(11) Advance care planning


Current Visit: Yes   Status: Acute   


Plan to address problem: 


Disease education conducted, care plan discussed, diagnosis discussed and 

prognosis discussed.  Patient acknowledged understanding with care plan.  +30 

minutes.

## 2022-08-15 LAB
ALBUMIN SERPL-MCNC: 2.4 G/DL (ref 3.9–5)
ALT SERPL-CCNC: 16 UNITS/L (ref 7–56)
BASOPHILS # (AUTO): 0 K/MM3 (ref 0–0.1)
BASOPHILS NFR BLD AUTO: 0.2 % (ref 0–1.8)
BUN SERPL-MCNC: 153 MG/DL (ref 9–20)
BUN/CREAT SERPL: 8 %
CALCIUM SERPL-MCNC: 9.6 MG/DL (ref 8.4–10.2)
EOSINOPHIL # BLD AUTO: 0.1 K/MM3 (ref 0–0.4)
EOSINOPHIL NFR BLD AUTO: 1.2 % (ref 0–4.3)
HCT VFR BLD CALC: 23.6 % (ref 35.5–45.6)
HEMOLYSIS INDEX: 0
HGB BLD-MCNC: 7.7 GM/DL (ref 11.8–15.2)
LYMPHOCYTES # BLD AUTO: 0.3 K/MM3 (ref 1.2–5.4)
LYMPHOCYTES NFR BLD AUTO: 2.8 % (ref 13.4–35)
MCHC RBC AUTO-ENTMCNC: 33 % (ref 32–34)
MCV RBC AUTO: 89 FL (ref 84–94)
MONOCYTES # (AUTO): 1.2 K/MM3 (ref 0–0.8)
MONOCYTES % (AUTO): 10.8 % (ref 0–7.3)
PLATELET # BLD: 252 K/MM3 (ref 140–440)
RBC # BLD AUTO: 2.65 M/MM3 (ref 3.65–5.03)

## 2022-08-15 PROCEDURE — 5A1D70Z PERFORMANCE OF URINARY FILTRATION, INTERMITTENT, LESS THAN 6 HOURS PER DAY: ICD-10-PCS | Performed by: INTERNAL MEDICINE

## 2022-08-15 RX ADMIN — INSULIN LISPRO SCH: 100 INJECTION, SOLUTION INTRAVENOUS; SUBCUTANEOUS at 13:24

## 2022-08-15 RX ADMIN — Medication SCH ML: at 22:26

## 2022-08-15 RX ADMIN — SEVELAMER CARBONATE SCH MG: 800 TABLET, FILM COATED ORAL at 08:23

## 2022-08-15 RX ADMIN — HEPARIN SODIUM SCH UNIT: 5000 INJECTION, SOLUTION INTRAVENOUS; SUBCUTANEOUS at 22:25

## 2022-08-15 RX ADMIN — SEVELAMER CARBONATE SCH MG: 800 TABLET, FILM COATED ORAL at 12:30

## 2022-08-15 RX ADMIN — SEVELAMER CARBONATE SCH: 800 TABLET, FILM COATED ORAL at 18:46

## 2022-08-15 RX ADMIN — INSULIN LISPRO SCH: 100 INJECTION, SOLUTION INTRAVENOUS; SUBCUTANEOUS at 08:16

## 2022-08-15 RX ADMIN — METOPROLOL TARTRATE SCH MG: 100 TABLET, FILM COATED ORAL at 13:20

## 2022-08-15 RX ADMIN — Medication SCH ML: at 12:30

## 2022-08-15 RX ADMIN — ASPIRIN SCH MG: 81 TABLET, COATED ORAL at 12:30

## 2022-08-15 RX ADMIN — HEPARIN SODIUM SCH UNIT: 5000 INJECTION, SOLUTION INTRAVENOUS; SUBCUTANEOUS at 13:22

## 2022-08-15 RX ADMIN — INSULIN LISPRO SCH UNIT: 100 INJECTION, SOLUTION INTRAVENOUS; SUBCUTANEOUS at 22:25

## 2022-08-15 RX ADMIN — INSULIN LISPRO SCH: 100 INJECTION, SOLUTION INTRAVENOUS; SUBCUTANEOUS at 18:46

## 2022-08-15 RX ADMIN — METOPROLOL TARTRATE SCH MG: 100 TABLET, FILM COATED ORAL at 22:25

## 2022-08-15 NOTE — PROGRESS NOTE
Assessment and Plan


1. ESRD:


Patient is on maintenance HD.


Last outpatient HD unknown.


Meds dosage based on GFR.


Hemodialysis: 6/13, 6/15.





2. FEN:


Hyperkalemia, on HD.


Hypernatremia, improved.


Monitor lytes and volume status.





3. Acute Metabolic Encephalopathy, POA:


Suspected Uremic encephalopathy.


Followed by Neuro.


Monitor.





4. Seizure:


On Keppra.





5. Normocytic anemia, POA:


S/p PRBC.


Epogen with HD.


Monitor.





6. T2DM (type 2 diabetes mellitus):


SSI.











Subjective:


Patient was seen and examined at the bedside.











Examination:


General appearance: well-developed, appears emaciated, no distress, on restrains


HEENT: ATNC, pupils equal


Neck: trachea midline


Respiratory: Clear to Auscultation


Cardiology: regular, S1S2, no murmur


Gastrointestinal: soft, normoactive bowel sounds, not tender, ND


Integumentary: diffuse discrete papular rash, fading


Neurologic: alert, tracking thru eyes, not following command, confused


Ext: no edema noted


Hemodialysis access: L arm AVF/AVG





Subjective


Date of service: 08/15/22





Objective





- Vital Signs


Vital signs: 


                               Vital Signs - 12hr











  08/15/22 08/15/22 08/15/22





  01:00 01:10 01:20


 


Temperature   


 


Pulse Rate 74 74 74


 


Respiratory 13 14 11 L





Rate   


 


Blood Pressure 118/67 118/67 118/67


 


O2 Sat by Pulse 98 100 100





Oximetry   


 


O2 Sat by Pulse   





Oximetry [   





Anterior   





Bilateral   





Throughout]   














  08/15/22 08/15/22 08/15/22





  01:30 01:40 01:50


 


Temperature   


 


Pulse Rate 70 75 75


 


Respiratory 31 H 15 18





Rate   


 


Blood Pressure 118/67 118/67 118/67


 


O2 Sat by Pulse 100 99 99





Oximetry   


 


O2 Sat by Pulse   





Oximetry [   





Anterior   





Bilateral   





Throughout]   














  08/15/22 08/15/22 08/15/22





  02:00 02:10 02:20


 


Temperature   


 


Pulse Rate 75 75 78


 


Respiratory 18 18 7 L





Rate   


 


Blood Pressure 125/73 125/73 125/73


 


O2 Sat by Pulse 100 99 98





Oximetry   


 


O2 Sat by Pulse   





Oximetry [   





Anterior   





Bilateral   





Throughout]   














  08/15/22 08/15/22 08/15/22





  02:30 02:40 02:50


 


Temperature   


 


Pulse Rate 76 76 78


 


Respiratory 31 H 13 22





Rate   


 


Blood Pressure 125/73 125/73 125/73


 


O2 Sat by Pulse 97 100 100





Oximetry   


 


O2 Sat by Pulse   





Oximetry [   





Anterior   





Bilateral   





Throughout]   














  08/15/22 08/15/22 08/15/22





  03:00 03:10 03:20


 


Temperature   


 


Pulse Rate 75 81 78


 


Respiratory 21 19 26 H





Rate   


 


Blood Pressure 132/74 125/73 125/73


 


O2 Sat by Pulse 98 99 100





Oximetry   


 


O2 Sat by Pulse   





Oximetry [   





Anterior   





Bilateral   





Throughout]   














  08/15/22 08/15/22 08/15/22





  03:30 03:40 03:49


 


Temperature   97.8 F


 


Pulse Rate 76 76 


 


Respiratory 30 H 29 H 





Rate   


 


Blood Pressure 125/73 132/74 


 


O2 Sat by Pulse 100 99 





Oximetry   


 


O2 Sat by Pulse   





Oximetry [   





Anterior   





Bilateral   





Throughout]   














  08/15/22 08/15/22 08/15/22





  03:50 04:00 04:10


 


Temperature   


 


Pulse Rate 74 74 76


 


Respiratory 19 25 H 28 H





Rate   


 


Blood Pressure 132/74 120/65 120/65


 


O2 Sat by Pulse 99 100 99





Oximetry   


 


O2 Sat by Pulse   





Oximetry [   





Anterior   





Bilateral   





Throughout]   














  08/15/22 08/15/22 08/15/22





  04:20 04:30 04:40


 


Temperature   


 


Pulse Rate 73 76 76


 


Respiratory 22 25 H 30 H





Rate   


 


Blood Pressure 120/65 120/65 120/65


 


O2 Sat by Pulse 99 98 95





Oximetry   


 


O2 Sat by Pulse   





Oximetry [   





Anterior   





Bilateral   





Throughout]   














  08/15/22 08/15/22 08/15/22





  05:00 05:16 05:30


 


Temperature   


 


Pulse Rate 75 73 70


 


Respiratory 13 23 13





Rate   


 


Blood Pressure 124/69 124/69 124/69


 


O2 Sat by Pulse 100 100 100





Oximetry   


 


O2 Sat by Pulse   





Oximetry [   





Anterior   





Bilateral   





Throughout]   














  08/15/22 08/15/22 08/15/22





  05:46 06:00 06:16


 


Temperature   


 


Pulse Rate 74 73 71


 


Respiratory 17 23 22





Rate   


 


Blood Pressure 124/69 126/67 124/69


 


O2 Sat by Pulse 100 100 100





Oximetry   


 


O2 Sat by Pulse   





Oximetry [   





Anterior   





Bilateral   





Throughout]   














  08/15/22 08/15/22 08/15/22





  06:30 06:46 07:00


 


Temperature   


 


Pulse Rate 72 71 73


 


Respiratory 17 15 20





Rate   


 


Blood Pressure 124/69 124/69 127/65


 


O2 Sat by Pulse 100 98 96





Oximetry   


 


O2 Sat by Pulse   





Oximetry [   





Anterior   





Bilateral   





Throughout]   














  08/15/22 08/15/22 08/15/22





  07:16 07:30 07:31


 


Temperature   97.9 F


 


Pulse Rate 74 122 H 


 


Respiratory 18 15 





Rate   


 


Blood Pressure 127/65 127/65 


 


O2 Sat by Pulse 97  





Oximetry   


 


O2 Sat by Pulse   





Oximetry [   





Anterior   





Bilateral   





Throughout]   














  08/15/22 08/15/22 08/15/22





  07:46 08:00 08:16


 


Temperature   


 


Pulse Rate 73 75 73


 


Respiratory 25 H 16 16





Rate   


 


Blood Pressure 127/65 127/65 127/65


 


O2 Sat by Pulse  100 





Oximetry   


 


O2 Sat by Pulse   





Oximetry [   





Anterior   





Bilateral   





Throughout]   














  08/15/22 08/15/22 08/15/22





  08:30 08:46 09:00


 


Temperature   


 


Pulse Rate 74 75 73


 


Respiratory 29 H 35 H 30 H





Rate   


 


Blood Pressure 127/65 127/65 131/66


 


O2 Sat by Pulse   





Oximetry   


 


O2 Sat by Pulse   





Oximetry [   





Anterior   





Bilateral   





Throughout]   














  08/15/22 08/15/22 08/15/22





  09:10 09:15 09:30


 


Temperature 97.7 F  


 


Pulse Rate 75 74 74


 


Respiratory 21 24 26 H





Rate   


 


Blood Pressure 129/66 131/64 126/61


 


O2 Sat by Pulse   100





Oximetry   


 


O2 Sat by Pulse 97  





Oximetry [   





Anterior   





Bilateral   





Throughout]   














  08/15/22 08/15/22 08/15/22





  09:45 10:00 10:15


 


Temperature   


 


Pulse Rate 79 76 75


 


Respiratory 19 26 H 25 H





Rate   


 


Blood Pressure 120/59 126/61 117/59


 


O2 Sat by Pulse 98 99 96





Oximetry   


 


O2 Sat by Pulse   





Oximetry [   





Anterior   





Bilateral   





Throughout]   














  08/15/22 08/15/22 08/15/22





  10:30 10:45 10:46


 


Temperature   


 


Pulse Rate 79 91 H 89


 


Respiratory 26 H  21





Rate   


 


Blood Pressure 140/61 143/65 143/65


 


O2 Sat by Pulse 99  57 L





Oximetry   


 


O2 Sat by Pulse   





Oximetry [   





Anterior   





Bilateral   





Throughout]   














  08/15/22 08/15/22 08/15/22





  11:00 11:15 11:16


 


Temperature   


 


Pulse Rate 88 90 87


 


Respiratory 14  16





Rate   


 


Blood Pressure 143/65 135/64 135/64


 


O2 Sat by Pulse   68 L





Oximetry   


 


O2 Sat by Pulse   





Oximetry [   





Anterior   





Bilateral   





Throughout]   














  08/15/22 08/15/22 08/15/22





  11:30 11:45 11:49


 


Temperature   98.2 F


 


Pulse Rate 89 83 


 


Respiratory 17 16 





Rate   


 


Blood Pressure 135/64 138/66 


 


O2 Sat by Pulse 100 100 





Oximetry   


 


O2 Sat by Pulse   





Oximetry [   





Anterior   





Bilateral   





Throughout]   














  08/15/22 08/15/22 08/15/22





  11:55 12:00 12:15


 


Temperature  97.2 F L 


 


Pulse Rate 83 86 80


 


Respiratory  17 27 H





Rate   


 


Blood Pressure 134/62 134/62 132/58


 


O2 Sat by Pulse  86 97





Oximetry   


 


O2 Sat by Pulse  99 





Oximetry [   





Anterior   





Bilateral   





Throughout]   














- Lab





                                 08/15/22 04:23





                                 08/15/22 04:23


                             Most recent lab results











Calcium  9.6 mg/dL (8.4-10.2)   08/15/22  04:23    


 


Phosphorus  10.10 mg/dL (2.5-4.5)  H  08/14/22  07:22    


 


Magnesium  2.20 mg/dL (1.7-2.3)   08/14/22  07:22    














Medications & Allergies





- Medications


Allergies/Adverse Reactions: 


                                    Allergies





No Known Allergies Allergy (Unverified 06/07/22 18:10)


   








Home Medications: 


                                Home Medications











 Medication  Instructions  Recorded  Confirmed  Last Taken  Type


 


Aspirin EC [Halfprin EC] 81 mg PO QDAY #30 tablet. 06/13/22  Unknown Rx


 


Metoprolol [Lopressor TAB] 100 mg PO BID #60 tablet 06/13/22  Unknown Rx


 


levETIRAcetam [Keppra TAB] 500 mg PO BID #60 tablet 06/13/22  Unknown Rx











Active Medications: 














Generic Name Dose Route Start Last Admin





  Trade Name Freq  PRN Reason Stop Dose Admin


 


Acetaminophen  650 mg  08/13/22 19:48 





  Acetaminophen 325 Mg Tab  PO  





  Q4H PRN  





  Pain MILD(1-3)/Fever >100.5/HA  


 


Aspirin  81 mg  08/14/22 10:00  08/14/22 10:12





  Aspirin Ec 81 Mg Tab  PO   81 mg





  QDAY LASHA   Administration


 


Heparin Sodium (Porcine)  5,000 unit  08/13/22 22:00  08/14/22 21:16





  Heparin 5,000 Unit/1 Ml Vial  SUB-Q   5,000 unit





  Q12HR LASHA   Administration


 


Hydromorphone HCl  0.5 mg  08/13/22 19:48 





  Hydromorphone 0.5 Mg/0.5 Ml Inj  IV  





  Q3H PRN  





  Pain , Severe (7-10)  


 


Sodium Chloride  100 mls @ 999 mls/hr  08/14/22 12:00 





  Nacl 0.9%  IV  





  EUSEBIA PRN  





  Hypotension  


 


Insulin Human Lispro  0 unit  08/14/22 07:30  08/15/22 08:16





  Insulin Lispro 100 Unit/Ml  SUB-Q   Not Given





  [facility]  





  Protocol  


 


Levetiracetam  500 mg  08/14/22 10:00  08/14/22 21:17





  Levetiracetam 500 Mg Tab  PO   500 mg





  BID LASHA   Administration


 


Metoclopramide HCl  10 mg  08/13/22 19:48 





  Metoclopramide 10 Mg/2 Ml Inj  IV  





  Q6H PRN  





  Nausea And Vomiting  


 


Metoprolol Tartrate  100 mg  08/14/22 10:00  08/14/22 21:17





  Metoprolol Tartrate 100 Mg Tab  PO   100 mg





  BID LASHA   Administration


 


Morphine Sulfate  2 mg  08/13/22 19:48 





  Morphine 2 Mg/1 Ml Inj  IV  





  Q4H PRN  





  Pain, Moderate (4-6)  


 


Ondansetron HCl  4 mg  08/13/22 19:48 





  Ondansetron 4 Mg/2 Ml Inj  IV  





  Q8H PRN  





  Nausea And Vomiting  


 


Oxycodone/Acetaminophen  1 tab  08/13/22 19:48 





  Oxycodone /Acetaminophen 5-325mg Tab  PO  





  Q6H PRN  





  Pain, Moderate (4-6)  


 


Sevelamer Carbonate  2,400 mg  08/14/22 12:30  08/15/22 08:23





  Sevelamer Carbonate 800 Mg Tab  PO   2,400 mg





  AC LASHA   Administration


 


Sodium Chloride  10 ml  08/13/22 22:00  08/14/22 21:17





  Sodium Chloride 0.9% 10 Ml Flush Syringe  IV   10 ml





  BID LASHA   Administration


 


Sodium Chloride  10 ml  08/13/22 19:48 





  Sodium Chloride 0.9% 10 Ml Flush Syringe  IV  





  PRN PRN  





  LINE FLUSH

## 2022-08-15 NOTE — PROGRESS NOTE
Assessment and Plan


Assessment and plan: 


History of present illness: 


51-year-old  seizure disorder hypertension type 2 diabetes and end-stage 

renal disease on hemodialysis presents with lethargy and confusion.  Family 

brought him to the emergency room stating that he is confused not oriented.  His

last known dialysis is not known.  Improvement on fluids.  No fever or chills.


No seizures.  Noncompliant with his medications.





hospital course:


8/14: AOx4 on bedside encounter. Transfused 1 unit due to hgb 6.8. Patient 

stated he has now missed several dialysis sessions. He [facility] 

[place] [facility] patient. will place CM consultation to ensure patient still has 

chair. Anticipate d/c tomorrow.





8/15: Hgb improved to 7.7. Will work with CM regarding dialysis chair as patient

prior chair due to noncompliance PTA.





Assessment and Plan:


(1) Acute metabolic encephalopathy (resolved)


Current Visit: Yes   Status: Acute   


Plan to address problem: 


Secondary to severe uremia.  His creatinine is 32.1 and BUN is 242.  Family does

not know how many days he has missed his hemodialysis.  I tried to get 

information whether he lives alone.  Could not reach anybody.  Patient needs 

multiple hemodialysis treatments to bring the creatinine down to reasonable 

levels.  Nephrology consulted.  Patient to get emergent hemodialysis.  Today.


Patient to be counseled about compliance once he  is more alert and oriented.








(2) Hyperkalemia (improved)


Current Visit: Yes   Status: Acute   


Plan to address problem: 


Treated in the emergency room and patient going for hemodialysis


Recheck potassium level


Correct with HD








(3) Metabolic acidosis (resolved)


Current Visit: Yes   Status: Acute   


Plan to address problem: 


Secondary to severe uremia.








(4) Seizure disorder


Current Visit: Yes   Status: Chronic   


Plan to address problem: 


Continue Keppra.








(5) T2DM (type 2 diabetes mellitus)


Current Visit: Yes   Status: Chronic   


Qualifiers: 


   Diabetes mellitus long term insulin use: unspecified long term insulin use 

status   Chronic kidney disease stage: on chronic dialysis 


Plan to address problem: 


Coverage for now








(6) ESRD needing dialysis


Current Visit: Yes   Status: Acute   


Plan to address problem: 


Emergent hemodialysis


Nephrology consult


Patient to be counseled about compliance








(7) Anemia


Current Visit: No   Status: Chronic   


Qualifiers: 


   Anemia type: due to chronic kidney disease 


Plan to address problem: 


Secondary to chronic kidney disease


Epogen as per nephrology


1 unit prbc given on 8/14.


Hgb and VSS currently stable.








(8) Hypertension


Current Visit: Yes   Status: Chronic   


Qualifiers: 


   Hypertension type: primary hypertension   Qualified Code(s): I10 - Essential 

(primary) hypertension   


Plan to address problem: 


Continue antihypertensives








(9) Malnutrition


Current Visit: Yes   Status: Chronic   


Qualifiers: 


   Malnutrition type: protein-calorie malnutrition   Protein-calorie 

malnutrition severity: severe   Qualified Code(s): E43 - Unspecified severe 

protein-calorie malnutrition   


Plan to address problem: 


Dietary supplements initiated


Dietitian consult requested








(10) DVT prophylaxis


Current Visit: Yes   Status: Acute   


Plan to address problem: 


On heparin and GI prophylaxis








(11) Advance care planning


Current Visit: Yes   Status: Acute   


Plan to address problem: 


Disease education conducted, care plan discussed, diagnosis discussed and 

prognosis discussed.  Patient acknowledged understanding with care plan.  +30 

minutes.





The high probability of a clinically significant, sudden or life threatening 

deterioration of the [multi] system(s) required my full and direct attention, 

intervention and personal management. The aggregate critical care time was [60] 

minutes. This time is in addition to time spent performing reported procedures 

but includes the following: 





[x] Data Review and interpretation 





[x] Patient assessment and monitoring of vital signs 





[x] Documentation 





[x] Medication orders and management





History


Interval history: 





NO acute complaints. Receiving dialysis during bedside encounter.





Hospitalist Physical





- Physical exam


Narrative exam: 





Physical Exam:


VITAL SIGNS:  Reviewed.    


GENERAL:  The patient appears normally developed, Vital signs as documented.


HEAD:  No signs of head trauma.


EYES:  Pupils are equal.  Extraocular motions intact.  


EARS:  Hearing grossly intact.


MOUTH:  Oropharynx is normal. 


NECK:  No adenopathy, no JVD.  


CHEST:  Chest with clear breath sounds bilaterally.  No wheezes, rales, or 

rhonchi.  


CARDIAC:  Regular rate and rhythm.  S1 and S2, without murmurs, gallops, or 

rubs.


VASCULAR:  No Edema.  Peripheral pulses normal and equal in all extremities.


ABDOMEN:  Soft, non tender and non distended.  No   rebound or guarding, and no 

masses palpated.   Bowel Sounds normal.


MUSCULOSKELETAL:  Good range of motion of all major joints. Extremities without 

clubbing, cyanosis or edema.  


NEUROLOGIC EXAM:  Alert and oriented x 4. no focal sensory or strength deficits.

  


PSYCHIATRIC:  Mood normal.


SKIN:  detail exam as documented in skin assessment





- Constitutional


Vitals: 


                                        











Temp Pulse Resp BP Pulse Ox


 


 97.9 F   75   16   127/65   97 


 


 08/15/22 07:31  08/15/22 08:00  08/15/22 08:00  08/15/22 08:00  08/15/22 07:16











General appearance: Present: no acute distress, well-nourished





HEART Score





- HEART Score


Troponin: 


                                        











Troponin T  0.933 ng/mL (0.00-0.029)  H*  08/13/22  01:28    














Results





- Labs


CBC & Chem 7: 


                                 08/15/22 04:23





                                 08/15/22 04:23


Labs: 


                             Laboratory Last Values











WBC  11.1 K/mm3 (4.5-11.0)  H  08/15/22  04:23    


 


RBC  2.65 M/mm3 (3.65-5.03)  L  08/15/22  04:23    


 


Hgb  7.7 gm/dl (11.8-15.2)  L  08/15/22  04:23    


 


Hct  23.6 % (35.5-45.6)  L  08/15/22  04:23    


 


MCV  89 fl (84-94)   08/15/22  04:23    


 


MCH  29 pg (28-32)   08/15/22  04:23    


 


MCHC  33 % (32-34)   08/15/22  04:23    


 


RDW  17.0 % (13.2-15.2)  H  08/15/22  04:23    


 


Plt Count  252 K/mm3 (140-440)   08/15/22  04:23    


 


Lymph % (Auto)  2.8 % (13.4-35.0)  L  08/15/22  04:23    


 


Mono % (Auto)  10.8 % (0.0-7.3)  H  08/15/22  04:23    


 


Eos % (Auto)  1.2 % (0.0-4.3)   08/15/22  04:23    


 


Baso % (Auto)  0.2 % (0.0-1.8)   08/15/22  04:23    


 


Lymph # (Auto)  0.3 K/mm3 (1.2-5.4)  L  08/15/22  04:23    


 


Mono # (Auto)  1.2 K/mm3 (0.0-0.8)  H  08/15/22  04:23    


 


Eos # (Auto)  0.1 K/mm3 (0.0-0.4)   08/15/22  04:23    


 


Baso # (Auto)  0.0 K/mm3 (0.0-0.1)   08/15/22  04:23    


 


Seg Neutrophils %  85.0 % (40.0-70.0)  H  08/15/22  04:23    


 


Seg Neutrophils #  9.4 K/mm3 (1.8-7.7)  H  08/15/22  04:23    


 


PT  17.0 Sec. (12.2-14.9)  H  08/13/22  01:28    


 


INR  1.23  (0.87-1.13)  H  08/13/22  01:28    


 


Sodium  144 mmol/L (137-145)   08/15/22  04:23    


 


Potassium  5.3 mmol/L (3.6-5.0)  H  08/15/22  04:23    


 


Chloride  95.8 mmol/L ()  L  08/15/22  04:23    


 


Carbon Dioxide  24 mmol/L (22-30)   08/15/22  04:23    


 


Anion Gap  30 mmol/L  08/15/22  04:23    


 


BUN  153 mg/dL (9-20)  H  08/15/22  04:23    


 


Creatinine  19.5 mg/dL (0.8-1.3)  H  08/15/22  04:23    


 


Estimated GFR  3 ml/min  08/15/22  04:23    


 


BUN/Creatinine Ratio  8 %  08/15/22  04:23    


 


Glucose  85 mg/dL ()   08/15/22  04:23    


 


POC Glucose  93 mg/dL ()   08/15/22  07:35    


 


Calcium  9.6 mg/dL (8.4-10.2)   08/15/22  04:23    


 


Phosphorus  10.10 mg/dL (2.5-4.5)  H  08/14/22  07:22    


 


Magnesium  2.20 mg/dL (1.7-2.3)   08/14/22  07:22    


 


Total Bilirubin  0.30 mg/dL (0.1-1.2)   08/15/22  04:23    


 


AST  16 units/L (5-40)   08/15/22  04:23    


 


ALT  16 units/L (7-56)   08/15/22  04:23    


 


Alkaline Phosphatase  60 units/L ()   08/15/22  04:23    


 


Total Creatine Kinase  1147 units/L ()  H  08/13/22  01:28    


 


Troponin T  0.933 ng/mL (0.00-0.029)  H*  08/13/22  01:28    


 


Total Protein  6.3 g/dL (6.3-8.2)   08/15/22  04:23    


 


Albumin  2.4 g/dL (3.9-5)  L  08/15/22  04:23    


 


Albumin/Globulin Ratio  0.6 %  08/15/22  04:23    


 


Triglycerides  178 mg/dL (2-149)  H  08/13/22  01:28    


 


Cholesterol  112 mg/dL ()   08/13/22  01:28    


 


LDL Cholesterol Direct  30 mg/dL ()  L  08/13/22  01:28    


 


HDL Cholesterol  39 mg/dL (40-59)  L  08/13/22  01:28    


 


Cholesterol/HDL Ratio  2.87 %  08/13/22  01:28    


 


Salicylates  < 0.3 mg/dL (2.8-20.0)  L  08/13/22  01:28    


 


Acetaminophen  5.0 ug/mL (10.0-30.0)  L  08/13/22  01:28    


 


Plasma/Serum Alcohol  < 0.01 % (0-0.07)   08/13/22  01:28    


 


Coronavirus (PCR)  Negative  (Negative)   08/14/22  Unknown


 


Hepatitis A IgM Ab  Non-reactive  (NonReactive)   08/13/22  01:28    


 


Hep Bs Antigen  Non-reactive  (Negative)   08/13/22  01:28    


 


Hep B Core IgM Ab  Non-reactive  (NonReactive)   08/13/22  01:28    


 


Hepatitis C Antibody  Non-reactive  (NonReactive)   08/13/22  01:28    


 


Blood Type  A POSITIVE   08/14/22  10:00    


 


Antibody Screen  Negative   08/14/22  10:00    


 


Crossmatch  See Detail   08/14/22  10:00    











Petty/IV: 


                                        





Voiding Method                   Condom Catheter











Active Medications





- Current Medications


Current Medications: 














Generic Name Dose Route Start Last Admin





  Trade Name Freq  PRN Reason Stop Dose Admin


 


Acetaminophen  650 mg  08/13/22 19:48 





  Acetaminophen 325 Mg Tab  PO  





  Q4H PRN  





  Pain MILD(1-3)/Fever >100.5/HA  


 


Aspirin  81 mg  08/14/22 10:00  08/14/22 10:12





  Aspirin Ec 81 Mg Tab  PO   81 mg





  QDAY LASHA   Administration


 


Heparin Sodium (Porcine)  5,000 unit  08/13/22 22:00  08/14/22 21:16





  Heparin 5,000 Unit/1 Ml Vial  SUB-Q   5,000 unit





  Q12HR LASHA   Administration


 


Hydromorphone HCl  0.5 mg  08/13/22 19:48 





  Hydromorphone 0.5 Mg/0.5 Ml Inj  IV  





  Q3H PRN  





  Pain , Severe (7-10)  


 


Sodium Chloride  100 mls @ 999 mls/hr  08/14/22 12:00 





  Nacl 0.9%  IV  





  ESUEBIA PRN  





  Hypotension  


 


Insulin Human Lispro  0 unit  08/14/22 07:30  08/14/22 21:21





  Insulin Lispro 100 Unit/Ml  SUB-Q   2 unit





  [facility]HS LASHA   Administration





  Protocol  


 


Levetiracetam  500 mg  08/14/22 10:00  08/14/22 21:17





  Levetiracetam 500 Mg Tab  PO   500 mg





  BID LASHA   Administration


 


Metoclopramide HCl  10 mg  08/13/22 19:48 





  Metoclopramide 10 Mg/2 Ml Inj  IV  





  Q6H PRN  





  Nausea And Vomiting  


 


Metoprolol Tartrate  100 mg  08/14/22 10:00  08/14/22 21:17





  Metoprolol Tartrate 100 Mg Tab  PO   100 mg





  BID LASHA   Administration


 


Morphine Sulfate  2 mg  08/13/22 19:48 





  Morphine 2 Mg/1 Ml Inj  IV  





  Q4H PRN  





  Pain, Moderate (4-6)  


 


Ondansetron HCl  4 mg  08/13/22 19:48 





  Ondansetron 4 Mg/2 Ml Inj  IV  





  Q8H PRN  





  Nausea And Vomiting  


 


Oxycodone/Acetaminophen  1 tab  08/13/22 19:48 





  Oxycodone /Acetaminophen 5-325mg Tab  PO  





  Q6H PRN  





  Pain, Moderate (4-6)  


 


Sevelamer Carbonate  2,400 mg  08/14/22 12:30  08/15/22 08:23





  Sevelamer Carbonate 800 Mg Tab  PO   2,400 mg





  AC LASHA   Administration


 


Sodium Chloride  10 ml  08/13/22 22:00  08/14/22 21:17





  Sodium Chloride 0.9% 10 Ml Flush Syringe  IV   10 ml





  BID LASHA   Administration


 


Sodium Chloride  10 ml  08/13/22 19:48 





  Sodium Chloride 0.9% 10 Ml Flush Syringe  IV  





  PRN PRN  





  LINE FLUSH

## 2022-08-16 LAB
BUN SERPL-MCNC: 88 MG/DL (ref 9–20)
BUN/CREAT SERPL: 7 %
CALCIUM SERPL-MCNC: 9.6 MG/DL (ref 8.4–10.2)
HCT VFR BLD CALC: 24.3 % (ref 35.5–45.6)
HEMOLYSIS INDEX: 0
HGB BLD-MCNC: 8.1 GM/DL (ref 11.8–15.2)

## 2022-08-16 RX ADMIN — ASPIRIN SCH MG: 81 TABLET, COATED ORAL at 09:22

## 2022-08-16 RX ADMIN — Medication SCH ML: at 23:18

## 2022-08-16 RX ADMIN — INSULIN LISPRO SCH: 100 INJECTION, SOLUTION INTRAVENOUS; SUBCUTANEOUS at 23:15

## 2022-08-16 RX ADMIN — HEPARIN SODIUM SCH UNIT: 5000 INJECTION, SOLUTION INTRAVENOUS; SUBCUTANEOUS at 09:22

## 2022-08-16 RX ADMIN — INSULIN LISPRO SCH: 100 INJECTION, SOLUTION INTRAVENOUS; SUBCUTANEOUS at 11:40

## 2022-08-16 RX ADMIN — HEPARIN SODIUM SCH UNIT: 5000 INJECTION, SOLUTION INTRAVENOUS; SUBCUTANEOUS at 23:18

## 2022-08-16 RX ADMIN — SEVELAMER CARBONATE SCH MG: 800 TABLET, FILM COATED ORAL at 16:21

## 2022-08-16 RX ADMIN — Medication SCH ML: at 09:23

## 2022-08-16 RX ADMIN — SEVELAMER CARBONATE SCH MG: 800 TABLET, FILM COATED ORAL at 11:35

## 2022-08-16 RX ADMIN — SEVELAMER CARBONATE SCH MG: 800 TABLET, FILM COATED ORAL at 08:21

## 2022-08-16 RX ADMIN — INSULIN LISPRO SCH UNIT: 100 INJECTION, SOLUTION INTRAVENOUS; SUBCUTANEOUS at 16:21

## 2022-08-16 RX ADMIN — METOPROLOL TARTRATE SCH MG: 100 TABLET, FILM COATED ORAL at 23:19

## 2022-08-16 RX ADMIN — METOPROLOL TARTRATE SCH MG: 100 TABLET, FILM COATED ORAL at 09:22

## 2022-08-16 RX ADMIN — INSULIN LISPRO SCH: 100 INJECTION, SOLUTION INTRAVENOUS; SUBCUTANEOUS at 08:21

## 2022-08-16 NOTE — PROGRESS NOTE
Assessment and Plan


Assessment and plan: 





51-year-old  seizure disorder hypertension type 2 diabetes and end-stage 

renal disease on hemodialysis presents with lethargy and confusion.  Family 

brought him to the emergency room stating that he is confused not oriented.  His

last known dialysis is not known.  Improvement on fluids.  No fever or chills.


No seizures.  Noncompliant with his medications.





hospital course:


8/14: AOx4 on bedside encounter. Transfused 1 unit due to hgb 6.8. Patient 

stated he has now missed several dialysis sessions. He goes to [facility] [facility] patient. will place CM consultation to ensure patient still has 

chair. Anticipate d/c tomorrow.





8/15: Hgb improved to 7.7. Will work with CM regarding dialysis chair as patient

prior chair due to noncompliance PTA.





8/16: Await case management decision regarding outpatient hemodialysis





Assessment and Plan:


(1) Acute metabolic encephalopathy (resolved)


Current Visit: Yes   Status: Acute   


Plan to address problem: 


Secondary to severe uremia.  His creatinine is 32.1 and BUN is 242.  Family does

not know how many days he has missed his hemodialysis.  


Patient to be counseled about compliance once he  is more alert and oriented.








(2) Hyperkalemia (improved)


Current Visit: Yes   Status: Acute   


Plan to address problem: 


Treated in the emergency room and patient going for hemodialysis


Correct with HD








(3) Metabolic acidosis (resolved)


Current Visit: Yes   Status: Acute   


Plan to address problem: 


Secondary to severe uremia.








(4) Seizure disorder


Current Visit: Yes   Status: Chronic   


Plan to address problem: 


Continue Keppra.








(5) T2DM (type 2 diabetes mellitus)


Current Visit: Yes   Status: Chronic   


Qualifiers: 


   Diabetes mellitus long term insulin use: unspecified long term insulin use 

status   Chronic kidney disease stage: on chronic dialysis 


Plan to address problem: 


Coverage for now








(6) ESRD needing dialysis


Current Visit: Yes   Status: Acute   


Plan to address problem: 


Continue hemodialysis


Nephrology following


Patient to be counseled about compliance








(7) Anemia


Current Visit: No   Status: Chronic   


Qualifiers: 


   Anemia type: due to chronic kidney disease 


Plan to address problem: 


Secondary to chronic kidney disease


Epogen as per nephrology


1 unit prbc given on 8/14.


Hgb and VSS currently stable.








(8) Hypertension


Current Visit: Yes   Status: Chronic   


Qualifiers: 


   Hypertension type: primary hypertension   Qualified Code(s): I10 - Essential 

(primary) hypertension   


Plan to address problem: 


Continue antihypertensives








(9) Malnutrition


Current Visit: Yes   Status: Chronic   


Qualifiers: 


   Malnutrition type: protein-calorie malnutrition   Protein-calorie 

malnutrition severity: severe   Qualified Code(s): E43 - Unspecified severe 

protein-calorie malnutrition   


Plan to address problem: 


Dietary supplements initiated


Dietitian consult requested











History


Interval history: 





No new issues overnight





Hospitalist Physical





- Constitutional


Vitals: 


                                        











Temp Pulse Resp BP Pulse Ox


 


 97.5 F L  74   20   115/70   100 


 


 08/16/22 09:37  08/16/22 09:37  08/16/22 09:37  08/16/22 09:37  08/16/22 09:37











General appearance: Present: no acute distress, well-nourished





- EENT


Eyes: Present: PERRL, EOM intact


ENT: hearing intact, clear oral mucosa, dentition normal





- Neck


Neck: Present: supple, normal ROM





- Respiratory


Respiratory effort: normal


Respiratory: bilateral: CTA





- Cardiovascular


Rhythm: regular


Heart Sounds: Present: S1 & S2.  Absent: gallop, rub





- Extremities


Extremities: no ischemia, No edema, Full ROM





- Abdominal


General gastrointestinal: soft, non-tender, non-distended, normal bowel sounds





- Integumentary


Integumentary: Present: clear, warm, dry





- Neurologic


Neurologic: CNII-XII intact, moves all extremities





HEART Score





- HEART Score


Troponin: 


                                        











Troponin T  0.933 ng/mL (0.00-0.029)  H*  08/13/22  01:28    














Results





- Labs


CBC & Chem 7: 


                                 08/16/22 04:00





                                 08/16/22 04:00


Labs: 


                             Laboratory Last Values











WBC  11.1 K/mm3 (4.5-11.0)  H  08/15/22  04:23    


 


RBC  2.65 M/mm3 (3.65-5.03)  L  08/15/22  04:23    


 


Hgb  8.1 gm/dl (11.8-15.2)  L  08/16/22  04:00    


 


Hct  24.3 % (35.5-45.6)  L  08/16/22  04:00    


 


MCV  89 fl (84-94)   08/15/22  04:23    


 


MCH  29 pg (28-32)   08/15/22  04:23    


 


MCHC  33 % (32-34)   08/15/22  04:23    


 


RDW  17.0 % (13.2-15.2)  H  08/15/22  04:23    


 


Plt Count  252 K/mm3 (140-440)   08/15/22  04:23    


 


Lymph % (Auto)  2.8 % (13.4-35.0)  L  08/15/22  04:23    


 


Mono % (Auto)  10.8 % (0.0-7.3)  H  08/15/22  04:23    


 


Eos % (Auto)  1.2 % (0.0-4.3)   08/15/22  04:23    


 


Baso % (Auto)  0.2 % (0.0-1.8)   08/15/22  04:23    


 


Lymph # (Auto)  0.3 K/mm3 (1.2-5.4)  L  08/15/22  04:23    


 


Mono # (Auto)  1.2 K/mm3 (0.0-0.8)  H  08/15/22  04:23    


 


Eos # (Auto)  0.1 K/mm3 (0.0-0.4)   08/15/22  04:23    


 


Baso # (Auto)  0.0 K/mm3 (0.0-0.1)   08/15/22  04:23    


 


Seg Neutrophils %  85.0 % (40.0-70.0)  H  08/15/22  04:23    


 


Seg Neutrophils #  9.4 K/mm3 (1.8-7.7)  H  08/15/22  04:23    


 


PT  17.0 Sec. (12.2-14.9)  H  08/13/22  01:28    


 


INR  1.23  (0.87-1.13)  H  08/13/22  01:28    


 


Sodium  135 mmol/L (137-145)  L D 08/16/22  04:00    


 


Potassium  4.6 mmol/L (3.6-5.0)   08/16/22  04:00    


 


Chloride  91.3 mmol/L ()  L  08/16/22  04:00    


 


Carbon Dioxide  28 mmol/L (22-30)   08/16/22  04:00    


 


Anion Gap  20 mmol/L  08/16/22  04:00    


 


BUN  88 mg/dL (9-20)  H  08/16/22  04:00    


 


Creatinine  12.3 mg/dL (0.8-1.3)  H  08/16/22  04:00    


 


Estimated GFR  5 ml/min  08/16/22  04:00    


 


BUN/Creatinine Ratio  7 %  08/16/22  04:00    


 


Glucose  113 mg/dL ()  H  08/16/22  04:00    


 


POC Glucose  109 mg/dL ()  H  08/16/22  07:49    


 


Calcium  9.6 mg/dL (8.4-10.2)   08/16/22  04:00    


 


Phosphorus  10.10 mg/dL (2.5-4.5)  H  08/14/22  07:22    


 


Magnesium  2.20 mg/dL (1.7-2.3)   08/14/22  07:22    


 


Total Bilirubin  0.30 mg/dL (0.1-1.2)   08/15/22  04:23    


 


AST  16 units/L (5-40)   08/15/22  04:23    


 


ALT  16 units/L (7-56)   08/15/22  04:23    


 


Alkaline Phosphatase  60 units/L ()   08/15/22  04:23    


 


Total Creatine Kinase  1147 units/L ()  H  08/13/22  01:28    


 


Troponin T  0.933 ng/mL (0.00-0.029)  H*  08/13/22  01:28    


 


Total Protein  6.3 g/dL (6.3-8.2)   08/15/22  04:23    


 


Albumin  2.4 g/dL (3.9-5)  L  08/15/22  04:23    


 


Albumin/Globulin Ratio  0.6 %  08/15/22  04:23    


 


Triglycerides  178 mg/dL (2-149)  H  08/13/22  01:28    


 


Cholesterol  112 mg/dL ()   08/13/22  01:28    


 


LDL Cholesterol Direct  30 mg/dL ()  L  08/13/22  01:28    


 


HDL Cholesterol  39 mg/dL (40-59)  L  08/13/22  01:28    


 


Cholesterol/HDL Ratio  2.87 %  08/13/22  01:28    


 


Salicylates  < 0.3 mg/dL (2.8-20.0)  L  08/13/22  01:28    


 


Acetaminophen  5.0 ug/mL (10.0-30.0)  L  08/13/22  01:28    


 


Plasma/Serum Alcohol  < 0.01 % (0-0.07)   08/13/22  01:28    


 


Coronavirus (PCR)  Negative  (Negative)   08/14/22  Unknown


 


SARS-CoV-2 (PCR)  Negative  (Negative)   08/15/22  11:05    


 


Hepatitis A IgM Ab  Non-reactive  (NonReactive)   08/13/22  01:28    


 


Hep Bs Antigen  Non-reactive  (Negative)   08/13/22  01:28    


 


Hep B Core IgM Ab  Non-reactive  (NonReactive)   08/13/22  01:28    


 


Hepatitis C Antibody  Non-reactive  (NonReactive)   08/13/22  01:28    


 


Blood Type  A POSITIVE   08/14/22  10:00    


 


Antibody Screen  Negative   08/14/22  10:00    


 


Crossmatch  See Detail   08/14/22  10:00    











Petty/IV: 


                                        





Voiding Method                   Condom Catheter











Active Medications





- Current Medications


Current Medications: 














Generic Name Dose Route Start Last Admin





  Trade Name Freq  PRN Reason Stop Dose Admin


 


Acetaminophen  650 mg  08/13/22 19:48 





  Acetaminophen 325 Mg Tab  PO  





  Q4H PRN  





  Pain MILD(1-3)/Fever >100.5/HA  


 


Aspirin  81 mg  08/14/22 10:00  08/15/22 12:30





  Aspirin Ec 81 Mg Tab  PO   81 mg





  QDAY LASHA   Administration


 


Heparin Sodium (Porcine)  5,000 unit  08/13/22 22:00  08/16/22 09:22





  Heparin 5,000 Unit/1 Ml Vial  SUB-Q   5,000 unit





  Q12HR LASHA   Administration


 


Hydromorphone HCl  0.5 mg  08/13/22 19:48 





  Hydromorphone 0.5 Mg/0.5 Ml Inj  IV  





  Q3H PRN  





  Pain , Severe (7-10)  


 


Sodium Chloride  100 mls @ 999 mls/hr  08/14/22 12:00 





  Nacl 0.9%  IV  





  EUSEBIA PRN  





  Hypotension  


 


Insulin Human Lispro  0 unit  08/14/22 07:30  08/16/22 08:21





  Insulin Lispro 100 Unit/Ml  SUB-Q   Not Given





  [facility]  





  Protocol  


 


Levetiracetam  500 mg  08/14/22 10:00  08/16/22 09:22





  Levetiracetam 500 Mg Tab  PO   500 mg





  BID LASHA   Administration


 


Metoclopramide HCl  5 mg  08/16/22 09:00 





  Metoclopramide 10 Mg/2 Ml Inj  IV  





  Q8H PRN  





  Nausea And Vomiting  


 


Metoprolol Tartrate  100 mg  08/14/22 10:00  08/16/22 09:22





  Metoprolol Tartrate 100 Mg Tab  PO   100 mg





  BID LASHA   Administration


 


Morphine Sulfate  2 mg  08/13/22 19:48 





  Morphine 2 Mg/1 Ml Inj  IV  





  Q4H PRN  





  Pain, Moderate (4-6)  


 


Ondansetron HCl  4 mg  08/13/22 19:48 





  Ondansetron 4 Mg/2 Ml Inj  IV  





  Q8H PRN  





  Nausea And Vomiting  


 


Oxycodone/Acetaminophen  1 tab  08/13/22 19:48 





  Oxycodone /Acetaminophen 5-325mg Tab  PO  





  Q6H PRN  





  Pain, Moderate (4-6)  


 


Sevelamer Carbonate  2,400 mg  08/14/22 12:30  08/16/22 08:21





  Sevelamer Carbonate 800 Mg Tab  PO   2,400 mg





  AC LASHA   Administration


 


Sodium Chloride  10 ml  08/13/22 22:00  08/16/22 09:23





  Sodium Chloride 0.9% 10 Ml Flush Syringe  IV   10 ml





  BID LASHA   Administration


 


Sodium Chloride  10 ml  08/13/22 19:48 





  Sodium Chloride 0.9% 10 Ml Flush Syringe  IV  





  PRN PRN  





  LINE FLUSH

## 2022-08-16 NOTE — PROGRESS NOTE
Assessment and Plan


1. ESRD:


Patient is on maintenance HD.


Last outpatient HD unknown.


Meds dosage based on GFR.


Hemodialysis: 6/13, 6/15.





2. FEN:


Hyperkalemia, on HD.


Hypernatremia, improved.


Monitor lytes and volume status.





3. Acute Metabolic Encephalopathy, POA:


Suspected Uremic encephalopathy.


Monitor.





4. Seizure:


On Keppra.





5. Normocytic anemia, POA:


S/p PRBC.


Epogen with HD.


Monitor.





6. T2DM (type 2 diabetes mellitus):


SSI.











Subjective:


Patient was seen and examined at the bedside.











Examination:


General appearance: well-developed, appears emaciated, no distress, on restrains


HEENT: ATNC, pupils equal


Neck: trachea midline


Respiratory: Clear to Auscultation


Cardiology: regular, S1S2, no murmur


Gastrointestinal: soft, normoactive bowel sounds, not tender, ND


Integumentary: diffuse discrete papular rash, fading


Neurologic: alert, tracking thru eyes, not following command


Ext: no edema noted


Hemodialysis access: L arm AVF/AVG





Subjective


Date of service: 08/16/22





Objective





- Vital Signs


Vital signs: 


                               Vital Signs - 12hr











  08/16/22 08/16/22





  04:42 09:37


 


Temperature 97.9 F 97.5 F L


 


Pulse Rate 76 74


 


Respiratory 18 20





Rate  


 


Blood Pressure 128/64 


 


Blood Pressure  115/70





[Right]  


 


O2 Sat by Pulse 100 100





Oximetry  














- Lab





                                 08/16/22 04:00





                                 08/16/22 04:00


                             Most recent lab results











Calcium  9.6 mg/dL (8.4-10.2)   08/16/22  04:00    


 


Phosphorus  10.10 mg/dL (2.5-4.5)  H  08/14/22  07:22    


 


Magnesium  2.20 mg/dL (1.7-2.3)   08/14/22  07:22    














Medications & Allergies





- Medications


Allergies/Adverse Reactions: 


                                    Allergies





No Known Allergies Allergy (Unverified 06/07/22 18:10)


   








Home Medications: 


                                Home Medications











 Medication  Instructions  Recorded  Confirmed  Last Taken  Type


 


Aspirin EC [Halfprin EC] 81 mg PO QDAY #30 tablet. 06/13/22  Unknown Rx


 


Metoprolol [Lopressor TAB] 100 mg PO BID #60 tablet 06/13/22  Unknown Rx


 


levETIRAcetam [Keppra TAB] 500 mg PO BID #60 tablet 06/13/22  Unknown Rx











Active Medications: 














Generic Name Dose Route Start Last Admin





  Trade Name Freq  PRN Reason Stop Dose Admin


 


Acetaminophen  650 mg  08/13/22 19:48 





  Acetaminophen 325 Mg Tab  PO  





  Q4H PRN  





  Pain MILD(1-3)/Fever >100.5/HA  


 


Aspirin  81 mg  08/14/22 10:00  08/16/22 09:22





  Aspirin Ec 81 Mg Tab  PO   81 mg





  QDAY LASHA   Administration


 


Heparin Sodium (Porcine)  5,000 unit  08/13/22 22:00  08/16/22 09:22





  Heparin 5,000 Unit/1 Ml Vial  SUB-Q   5,000 unit





  Q12HR LASHA   Administration


 


Hydromorphone HCl  0.5 mg  08/13/22 19:48 





  Hydromorphone 0.5 Mg/0.5 Ml Inj  IV  





  Q3H PRN  





  Pain , Severe (7-10)  


 


Sodium Chloride  100 mls @ 999 mls/hr  08/14/22 12:00 





  Nacl 0.9%  IV  





  EUSEBIA PRN  





  Hypotension  


 


Insulin Human Lispro  0 unit  08/14/22 07:30  08/16/22 11:40





  Insulin Lispro 100 Unit/Ml  SUB-Q   Not Given





  [facility]  





  Protocol  


 


Levetiracetam  500 mg  08/14/22 10:00  08/16/22 09:22





  Levetiracetam 500 Mg Tab  PO   500 mg





  BID LASHA   Administration


 


Metoclopramide HCl  5 mg  08/16/22 09:00 





  Metoclopramide 10 Mg/2 Ml Inj  IV  





  Q8H PRN  





  Nausea And Vomiting  


 


Metoprolol Tartrate  100 mg  08/14/22 10:00  08/16/22 09:22





  Metoprolol Tartrate 100 Mg Tab  PO   100 mg





  BID LASHA   Administration


 


Morphine Sulfate  2 mg  08/13/22 19:48 





  Morphine 2 Mg/1 Ml Inj  IV  





  Q4H PRN  





  Pain, Moderate (4-6)  


 


Ondansetron HCl  4 mg  08/13/22 19:48 





  Ondansetron 4 Mg/2 Ml Inj  IV  





  Q8H PRN  





  Nausea And Vomiting  


 


Oxycodone/Acetaminophen  1 tab  08/13/22 19:48 





  Oxycodone /Acetaminophen 5-325mg Tab  PO  





  Q6H PRN  





  Pain, Moderate (4-6)  


 


Sevelamer Carbonate  2,400 mg  08/14/22 12:30  08/16/22 11:35





  Sevelamer Carbonate 800 Mg Tab  PO   2,400 mg





  AC LASHA   Administration


 


Sodium Chloride  10 ml  08/13/22 22:00  08/16/22 09:23





  Sodium Chloride 0.9% 10 Ml Flush Syringe  IV   10 ml





  BID LASHA   Administration


 


Sodium Chloride  10 ml  08/13/22 19:48 





  Sodium Chloride 0.9% 10 Ml Flush Syringe  IV  





  PRN PRN  





  LINE FLUSH

## 2022-08-17 LAB
BASOPHILS # (AUTO): 0 K/MM3 (ref 0–0.1)
BASOPHILS NFR BLD AUTO: 0.3 % (ref 0–1.8)
BUN SERPL-MCNC: 107 MG/DL (ref 9–20)
BUN/CREAT SERPL: 8 %
CALCIUM SERPL-MCNC: 9 MG/DL (ref 8.4–10.2)
EOSINOPHIL # BLD AUTO: 0.3 K/MM3 (ref 0–0.4)
EOSINOPHIL NFR BLD AUTO: 3 % (ref 0–4.3)
HCT VFR BLD CALC: 25 % (ref 35.5–45.6)
HEMOLYSIS INDEX: 0
HGB BLD-MCNC: 8.3 GM/DL (ref 11.8–15.2)
LYMPHOCYTES # BLD AUTO: 0.5 K/MM3 (ref 1.2–5.4)
LYMPHOCYTES NFR BLD AUTO: 6.2 % (ref 13.4–35)
MCHC RBC AUTO-ENTMCNC: 33 % (ref 32–34)
MCV RBC AUTO: 88 FL (ref 84–94)
MONOCYTES # (AUTO): 0.8 K/MM3 (ref 0–0.8)
MONOCYTES % (AUTO): 10.1 % (ref 0–7.3)
PLATELET # BLD: 258 K/MM3 (ref 140–440)
RBC # BLD AUTO: 2.82 M/MM3 (ref 3.65–5.03)

## 2022-08-17 PROCEDURE — 5A1D70Z PERFORMANCE OF URINARY FILTRATION, INTERMITTENT, LESS THAN 6 HOURS PER DAY: ICD-10-PCS | Performed by: INTERNAL MEDICINE

## 2022-08-17 RX ADMIN — Medication SCH ML: at 09:10

## 2022-08-17 RX ADMIN — SEVELAMER CARBONATE SCH MG: 800 TABLET, FILM COATED ORAL at 16:34

## 2022-08-17 RX ADMIN — INSULIN LISPRO SCH UNIT: 100 INJECTION, SOLUTION INTRAVENOUS; SUBCUTANEOUS at 16:34

## 2022-08-17 RX ADMIN — SEVELAMER CARBONATE SCH: 800 TABLET, FILM COATED ORAL at 11:21

## 2022-08-17 RX ADMIN — Medication SCH ML: at 21:30

## 2022-08-17 RX ADMIN — INSULIN LISPRO SCH: 100 INJECTION, SOLUTION INTRAVENOUS; SUBCUTANEOUS at 08:57

## 2022-08-17 RX ADMIN — HEPARIN SODIUM SCH UNIT: 5000 INJECTION, SOLUTION INTRAVENOUS; SUBCUTANEOUS at 09:10

## 2022-08-17 RX ADMIN — METOPROLOL TARTRATE SCH MG: 100 TABLET, FILM COATED ORAL at 09:10

## 2022-08-17 RX ADMIN — HEPARIN SODIUM SCH UNIT: 5000 INJECTION, SOLUTION INTRAVENOUS; SUBCUTANEOUS at 22:25

## 2022-08-17 RX ADMIN — INSULIN LISPRO SCH: 100 INJECTION, SOLUTION INTRAVENOUS; SUBCUTANEOUS at 11:20

## 2022-08-17 RX ADMIN — SEVELAMER CARBONATE SCH MG: 800 TABLET, FILM COATED ORAL at 08:07

## 2022-08-17 RX ADMIN — METOPROLOL TARTRATE SCH MG: 100 TABLET, FILM COATED ORAL at 23:40

## 2022-08-17 RX ADMIN — ASPIRIN SCH MG: 81 TABLET, COATED ORAL at 09:10

## 2022-08-17 RX ADMIN — INSULIN LISPRO SCH: 100 INJECTION, SOLUTION INTRAVENOUS; SUBCUTANEOUS at 22:24

## 2022-08-17 NOTE — PROGRESS NOTE
Assessment and Plan


1. ESRD:


Patient is on maintenance HD.


Last outpatient HD unknown.


Meds dosage based on GFR.


Hemodialysis: 6/13, 6/15.


HD today.





2. FEN:


Hyperkalemia, on HD.


Hypernatremia, improved.


Monitor lytes and volume status.





3. Acute Metabolic Encephalopathy, POA:


Suspected Uremic encephalopathy.


Monitor.





4. Seizure:


On Keppra.





5. Normocytic anemia, POA:


S/p PRBC.


Epogen with HD.


Monitor.





6. T2DM (type 2 diabetes mellitus):


SSI.











Subjective:


Patient was seen and examined at the bedside.











Examination:


General appearance: well-developed, appears emaciated, no distress


HEENT: ATNC, pupils equal


Neck: trachea midline


Respiratory: Clear to Auscultation


Cardiology: regular, S1S2, no murmur


Gastrointestinal: soft, normoactive bowel sounds, not tender, ND


Integumentary: diffuse discrete papular rash, fading


Neurologic: alert, tracking thru eyes, not following command


Ext: no edema noted


Hemodialysis access: L arm AVF/AVG





Subjective


Date of service: 08/17/22





Objective





- Vital Signs


Vital signs: 


                               Vital Signs - 12hr











  08/16/22 08/16/22 08/17/22





  23:10 23:19 05:25


 


Temperature 98.3 F  97.9 F


 


Pulse Rate 71 71 70


 


Respiratory 18  18





Rate   


 


Blood Pressure 116/65 116/65 113/71


 


O2 Sat by Pulse 98  99





Oximetry   


 


O2 Sat by Pulse   





Oximetry [   





Anterior   





Bilateral   





Throughout]   














  08/17/22 08/17/22 08/17/22





  09:48 09:50 10:00


 


Temperature  97.6 F 


 


Pulse Rate  73 71


 


Respiratory 20 20 





Rate   


 


Blood Pressure  117/63 116/63


 


O2 Sat by Pulse 100  





Oximetry   


 


O2 Sat by Pulse  99 





Oximetry [   





Anterior   





Bilateral   





Throughout]   














  08/17/22 08/17/22





  10:15 10:30


 


Temperature  


 


Pulse Rate 69 72


 


Respiratory  





Rate  


 


Blood Pressure 116/60 117/63


 


O2 Sat by Pulse  





Oximetry  


 


O2 Sat by Pulse  





Oximetry [  





Anterior  





Bilateral  





Throughout]  














- Lab





                                 08/17/22 05:05





                                 08/17/22 05:05


                             Most recent lab results











Calcium  9.0 mg/dL (8.4-10.2)   08/17/22  05:05    


 


Phosphorus  10.10 mg/dL (2.5-4.5)  H  08/14/22  07:22    


 


Magnesium  2.20 mg/dL (1.7-2.3)   08/14/22  07:22    














Medications & Allergies





- Medications


Allergies/Adverse Reactions: 


                                    Allergies





No Known Allergies Allergy (Unverified 06/07/22 18:10)


   








Home Medications: 


                                Home Medications











 Medication  Instructions  Recorded  Confirmed  Last Taken  Type


 


Aspirin EC [Halfprin EC] 81 mg PO QDAY #30 tablet. 06/13/22  Unknown Rx


 


Metoprolol [Lopressor TAB] 100 mg PO BID #60 tablet 06/13/22  Unknown Rx


 


levETIRAcetam [Keppra TAB] 500 mg PO BID #60 tablet 06/13/22  Unknown Rx











Active Medications: 














Generic Name Dose Route Start Last Admin





  Trade Name Freq  PRN Reason Stop Dose Admin


 


Acetaminophen  650 mg  08/13/22 19:48 





  Acetaminophen 325 Mg Tab  PO  





  Q4H PRN  





  Pain MILD(1-3)/Fever >100.5/HA  


 


Aspirin  81 mg  08/14/22 10:00  08/17/22 09:10





  Aspirin Ec 81 Mg Tab  PO   81 mg





  QDAY LASHA   Administration


 


Epoetin Jesus-epbx  20,000 unit  08/16/22 21:57 





  Epoetin Jesus-Epbx 10,000 Unit/1 Ml Vial  SUB-Q  





  EUSEBIA PRN  





  hemodialysis  


 


Heparin Sodium (Porcine)  5,000 unit  08/13/22 22:00  08/17/22 09:10





  Heparin 5,000 Unit/1 Ml Vial  SUB-Q   5,000 unit





  Q12HR LAHSA   Administration


 


Hydromorphone HCl  0.5 mg  08/13/22 19:48 





  Hydromorphone 0.5 Mg/0.5 Ml Inj  IV  





  Q3H PRN  





  Pain , Severe (7-10)  


 


Sodium Chloride  100 mls @ 999 mls/hr  08/14/22 12:00 





  Nacl 0.9%  IV  





  EUSEBIA PRN  





  Hypotension  


 


Insulin Human Lispro  0 unit  08/14/22 07:30  08/17/22 08:57





  Insulin Lispro 100 Unit/Ml  SUB-Q   Not Given





  [facility]  





  Protocol  


 


Levetiracetam  500 mg  08/14/22 10:00  08/17/22 09:10





  Levetiracetam 500 Mg Tab  PO   500 mg





  BID LASHA   Administration


 


Metoclopramide HCl  5 mg  08/16/22 09:00 





  Metoclopramide 10 Mg/2 Ml Inj  IV  





  Q8H PRN  





  Nausea And Vomiting  


 


Metoprolol Tartrate  100 mg  08/14/22 10:00  08/17/22 09:10





  Metoprolol Tartrate 100 Mg Tab  PO   100 mg





  BID LASHA   Administration


 


Morphine Sulfate  2 mg  08/13/22 19:48 





  Morphine 2 Mg/1 Ml Inj  IV  





  Q4H PRN  





  Pain, Moderate (4-6)  


 


Ondansetron HCl  4 mg  08/13/22 19:48 





  Ondansetron 4 Mg/2 Ml Inj  IV  





  Q8H PRN  





  Nausea And Vomiting  


 


Oxycodone/Acetaminophen  1 tab  08/13/22 19:48 





  Oxycodone /Acetaminophen 5-325mg Tab  PO  





  Q6H PRN  





  Pain, Moderate (4-6)  


 


Sevelamer Carbonate  2,400 mg  08/14/22 12:30  08/17/22 08:07





  Sevelamer Carbonate 800 Mg Tab  PO   2,400 mg





  AC LASHA   Administration


 


Sodium Chloride  10 ml  08/13/22 22:00  08/17/22 09:10





  Sodium Chloride 0.9% 10 Ml Flush Syringe  IV   10 ml





  BID LASHA   Administration


 


Sodium Chloride  10 ml  08/13/22 19:48 





  Sodium Chloride 0.9% 10 Ml Flush Syringe  IV  





  PRN PRN  





  LINE FLUSH

## 2022-08-17 NOTE — PROGRESS NOTE
Assessment and Plan


Assessment and plan: 





51-year-old  seizure disorder hypertension type 2 diabetes and end-stage 

renal disease on hemodialysis presents with lethargy and confusion.  Family 

brought him to the emergency room stating that he is confused not oriented.  His

last known dialysis is not known.  Improvement on fluids.  No fever or chills.


No seizures.  Noncompliant with his medications.





hospital course:


8/14: AOx4 on bedside encounter. Transfused 1 unit due to hgb 6.8. Patient 

stated he has now missed several dialysis sessions. He goes to [facility] 

[facility] per patient. will place CM consultation to ensure patient still has 

chair. Anticipate d/c tomorrow.





8/15: Hgb improved to 7.7. Will work with CM regarding dialysis chair as patient

prior chair due to noncompliance PTA.





8/16: Await case management decision regarding outpatient hemodialysis





8/17: Physical therapy recommends SNF placement.  We will discuss with case 

management disposition.  Continue hemodialysis per nephrology recommendations.





Assessment and Plan:


(1) Acute metabolic encephalopathy (resolved)


Current Visit: Yes   Status: Acute   


Plan to address problem: 


Secondary to severe uremia.  His creatinine is 32.1 and BUN is 242.  Family does

not know how many days he has missed his hemodialysis.  


Patient to be counseled about compliance once he  is more alert and oriented.








(2) Hyperkalemia (improved)


Current Visit: Yes   Status: Acute   


Plan to address problem: 


Treated in the emergency room and patient going for hemodialysis


Correct with HD








(3) Metabolic acidosis (resolved)


Current Visit: Yes   Status: Acute   


Plan to address problem: 


Secondary to severe uremia.








(4) Seizure disorder


Current Visit: Yes   Status: Chronic   


Plan to address problem: 


Continue Keppra.








(5) T2DM (type 2 diabetes mellitus)


Current Visit: Yes   Status: Chronic   


Qualifiers: 


   Diabetes mellitus long term insulin use: unspecified long term insulin use 

status   Chronic kidney disease stage: on chronic dialysis 


Plan to address problem: 


Coverage for now








(6) ESRD needing dialysis


Current Visit: Yes   Status: Acute   


Plan to address problem: 


Continue hemodialysis


Nephrology following


Patient to be counseled about compliance








(7) Anemia


Current Visit: No   Status: Chronic   


Qualifiers: 


   Anemia type: due to chronic kidney disease 


Plan to address problem: 


Secondary to chronic kidney disease


Epogen as per nephrology


1 unit prbc given on 8/14.


Hgb and VSS currently stable.








(8) Hypertension


Current Visit: Yes   Status: Chronic   


Qualifiers: 


   Hypertension type: primary hypertension   Qualified Code(s): I10 - Essential 

(primary) hypertension   


Plan to address problem: 


Continue antihypertensives








(9) Malnutrition


Current Visit: Yes   Status: Chronic   


Qualifiers: 


   Malnutrition type: protein-calorie malnutrition   Protein-calorie 

malnutrition severity: severe   Qualified Code(s): E43 - Unspecified severe 

protein-calorie malnutrition   


Plan to address problem: 


Dietary supplements initiated


Dietitian consult requested











History


Interval history: 





No new issues overnight





Hospitalist Physical





- Constitutional


Vitals: 


                                        











Temp Pulse Resp BP Pulse Ox


 


 97.9 F   70   20   113/71   100 


 


 08/17/22 05:25  08/17/22 05:25  08/17/22 09:48  08/17/22 05:25  08/17/22 09:48











General appearance: Present: no acute distress, well-nourished





- EENT


Eyes: Present: PERRL, EOM intact


ENT: hearing intact, clear oral mucosa, dentition normal





- Neck


Neck: Present: supple, normal ROM





- Respiratory


Respiratory effort: normal


Respiratory: bilateral: CTA





- Cardiovascular


Rhythm: regular


Heart Sounds: Present: S1 & S2.  Absent: gallop, rub





- Extremities


Extremities: no ischemia, No edema, Full ROM





- Abdominal


General gastrointestinal: soft, non-tender, non-distended, normal bowel sounds





- Integumentary


Integumentary: Present: clear, warm, dry





- Neurologic


Neurologic: CNII-XII intact, moves all extremities





HEART Score





- HEART Score


Troponin: 


                                        











Troponin T  0.933 ng/mL (0.00-0.029)  H*  08/13/22  01:28    














Results





- Labs


CBC & Chem 7: 


                                 08/17/22 05:05





                                 08/17/22 05:05


Labs: 


                             Laboratory Last Values











WBC  8.4 K/mm3 (4.5-11.0)   08/17/22  05:05    


 


RBC  2.82 M/mm3 (3.65-5.03)  L  08/17/22  05:05    


 


Hgb  8.3 gm/dl (11.8-15.2)  L  08/17/22  05:05    


 


Hct  25.0 % (35.5-45.6)  L  08/17/22  05:05    


 


MCV  88 fl (84-94)   08/17/22  05:05    


 


MCH  30 pg (28-32)   08/17/22  05:05    


 


MCHC  33 % (32-34)   08/17/22  05:05    


 


RDW  16.3 % (13.2-15.2)  H  08/17/22  05:05    


 


Plt Count  258 K/mm3 (140-440)   08/17/22  05:05    


 


Lymph % (Auto)  6.2 % (13.4-35.0)  L  08/17/22  05:05    


 


Mono % (Auto)  10.1 % (0.0-7.3)  H  08/17/22  05:05    


 


Eos % (Auto)  3.0 % (0.0-4.3)   08/17/22  05:05    


 


Baso % (Auto)  0.3 % (0.0-1.8)   08/17/22  05:05    


 


Lymph # (Auto)  0.5 K/mm3 (1.2-5.4)  L  08/17/22  05:05    


 


Mono # (Auto)  0.8 K/mm3 (0.0-0.8)   08/17/22  05:05    


 


Eos # (Auto)  0.3 K/mm3 (0.0-0.4)   08/17/22  05:05    


 


Baso # (Auto)  0.0 K/mm3 (0.0-0.1)   08/17/22  05:05    


 


Seg Neutrophils %  80.4 % (40.0-70.0)  H  08/17/22  05:05    


 


Seg Neutrophils #  6.8 K/mm3 (1.8-7.7)   08/17/22  05:05    


 


PT  17.0 Sec. (12.2-14.9)  H  08/13/22  01:28    


 


INR  1.23  (0.87-1.13)  H  08/13/22  01:28    


 


Sodium  137 mmol/L (137-145)   08/17/22  05:05    


 


Potassium  4.6 mmol/L (3.6-5.0)   08/17/22  05:05    


 


Chloride  92.2 mmol/L ()  L  08/17/22  05:05    


 


Carbon Dioxide  27 mmol/L (22-30)   08/17/22  05:05    


 


Anion Gap  22 mmol/L  08/17/22  05:05    


 


BUN  107 mg/dL (9-20)  H  08/17/22  05:05    


 


Creatinine  13.7 mg/dL (0.8-1.3)  H  08/17/22  05:05    


 


Estimated GFR  5 ml/min  08/17/22  05:05    


 


BUN/Creatinine Ratio  8 %  08/17/22  05:05    


 


Glucose  181 mg/dL ()  H  08/17/22  05:05    


 


POC Glucose  105 mg/dL ()   08/16/22  23:09    


 


Calcium  9.0 mg/dL (8.4-10.2)   08/17/22  05:05    


 


Phosphorus  10.10 mg/dL (2.5-4.5)  H  08/14/22  07:22    


 


Magnesium  2.20 mg/dL (1.7-2.3)   08/14/22  07:22    


 


Total Bilirubin  0.30 mg/dL (0.1-1.2)   08/15/22  04:23    


 


AST  16 units/L (5-40)   08/15/22  04:23    


 


ALT  16 units/L (7-56)   08/15/22  04:23    


 


Alkaline Phosphatase  60 units/L ()   08/15/22  04:23    


 


Total Creatine Kinase  1147 units/L ()  H  08/13/22  01:28    


 


Troponin T  0.933 ng/mL (0.00-0.029)  H*  08/13/22  01:28    


 


Total Protein  6.3 g/dL (6.3-8.2)   08/15/22  04:23    


 


Albumin  2.4 g/dL (3.9-5)  L  08/15/22  04:23    


 


Albumin/Globulin Ratio  0.6 %  08/15/22  04:23    


 


Triglycerides  178 mg/dL (2-149)  H  08/13/22  01:28    


 


Cholesterol  112 mg/dL ()   08/13/22  01:28    


 


LDL Cholesterol Direct  30 mg/dL ()  L  08/13/22  01:28    


 


HDL Cholesterol  39 mg/dL (40-59)  L  08/13/22  01:28    


 


Cholesterol/HDL Ratio  2.87 %  08/13/22  01:28    


 


Salicylates  < 0.3 mg/dL (2.8-20.0)  L  08/13/22  01:28    


 


Acetaminophen  5.0 ug/mL (10.0-30.0)  L  08/13/22  01:28    


 


Plasma/Serum Alcohol  < 0.01 % (0-0.07)   08/13/22  01:28    


 


Coronavirus (PCR)  Negative  (Negative)   08/14/22  Unknown


 


SARS-CoV-2 (PCR)  Negative  (Negative)   08/15/22  11:05    


 


Hepatitis A IgM Ab  Non-reactive  (NonReactive)   08/13/22  01:28    


 


Hep Bs Antigen  Non-reactive  (Negative)   08/13/22  01:28    


 


Hep B Core IgM Ab  Non-reactive  (NonReactive)   08/13/22  01:28    


 


Hepatitis C Antibody  Non-reactive  (NonReactive)   08/13/22  01:28    


 


Blood Type  A POSITIVE   08/14/22  10:00    


 


Antibody Screen  Negative   08/14/22  10:00    


 


Crossmatch  See Detail   08/14/22  10:00    











Petty/IV: 


                                        





Voiding Method                   Condom Catheter











Active Medications





- Current Medications


Current Medications: 














Generic Name Dose Route Start Last Admin





  Trade Name Freq  PRN Reason Stop Dose Admin


 


Acetaminophen  650 mg  08/13/22 19:48 





  Acetaminophen 325 Mg Tab  PO  





  Q4H PRN  





  Pain MILD(1-3)/Fever >100.5/HA  


 


Aspirin  81 mg  08/14/22 10:00  08/17/22 09:10





  Aspirin Ec 81 Mg Tab  PO   81 mg





  QDAY LASHA   Administration


 


Epoetin Jesus-epbx  20,000 unit  08/16/22 21:57 





  Epoetin Jesus-Epbx 10,000 Unit/1 Ml Vial  SUB-Q  





  EUSEBIA PRN  





  hemodialysis  


 


Heparin Sodium (Porcine)  5,000 unit  08/13/22 22:00  08/17/22 09:10





  Heparin 5,000 Unit/1 Ml Vial  SUB-Q   5,000 unit





  Q12HR LSAHA   Administration


 


Hydromorphone HCl  0.5 mg  08/13/22 19:48 





  Hydromorphone 0.5 Mg/0.5 Ml Inj  IV  





  Q3H PRN  





  Pain , Severe (7-10)  


 


Sodium Chloride  100 mls @ 999 mls/hr  08/14/22 12:00 





  Nacl 0.9%  IV  





  EUSEBIA PRN  





  Hypotension  


 


Insulin Human Lispro  0 unit  08/14/22 07:30  08/17/22 08:57





  Insulin Lispro 100 Unit/Ml  SUB-Q   Not Given





  [facility]  





  Protocol  


 


Levetiracetam  500 mg  08/14/22 10:00  08/17/22 09:10





  Levetiracetam 500 Mg Tab  PO   500 mg





  BID LASHA   Administration


 


Metoclopramide HCl  5 mg  08/16/22 09:00 





  Metoclopramide 10 Mg/2 Ml Inj  IV  





  Q8H PRN  





  Nausea And Vomiting  


 


Metoprolol Tartrate  100 mg  08/14/22 10:00  08/17/22 09:10





  Metoprolol Tartrate 100 Mg Tab  PO   100 mg





  BID LASHA   Administration


 


Morphine Sulfate  2 mg  08/13/22 19:48 





  Morphine 2 Mg/1 Ml Inj  IV  





  Q4H PRN  





  Pain, Moderate (4-6)  


 


Ondansetron HCl  4 mg  08/13/22 19:48 





  Ondansetron 4 Mg/2 Ml Inj  IV  





  Q8H PRN  





  Nausea And Vomiting  


 


Oxycodone/Acetaminophen  1 tab  08/13/22 19:48 





  Oxycodone /Acetaminophen 5-325mg Tab  PO  





  Q6H PRN  





  Pain, Moderate (4-6)  


 


Sevelamer Carbonate  2,400 mg  08/14/22 12:30  08/17/22 08:07





  Sevelamer Carbonate 800 Mg Tab  PO   2,400 mg





  AC LASHA   Administration


 


Sodium Chloride  10 ml  08/13/22 22:00  08/17/22 09:10





  Sodium Chloride 0.9% 10 Ml Flush Syringe  IV   10 ml





  BID LASHA   Administration


 


Sodium Chloride  10 ml  08/13/22 19:48 





  Sodium Chloride 0.9% 10 Ml Flush Syringe  IV  





  PRN PRN  





  LINE FLUSH  














Nutrition/Malnutrition Assess





- Dietary Evaluation


Nutrition/Malnutrition Findings: 


                                        





Nutrition Notes                                            Start:  08/16/22 

15:24


Freq:                                                      Status: Active       




Protocol:                                                                       




 Document     08/16/22 15:24  GLORIA  (Rec: 08/16/22 15:47  GLORIA  [id number])


 Nutrition Notes


     Need for Assessment generated from:         Low BMI


     Initial or Follow up                        Assessment


     Current Diagnosis                           CKD (stage V CKD),Diabetes,


                                                 Hypertension,Malnutrition


     Other Pertinent Diagnosis                   ESRD+HD, Seizure Disorder,


                                                 Anemia, s/p Metabolic


                                                 Encephalopathy.


     Current Diet                                Renal Diet (since D 08/13), D


                                                 Suppl (from D 08/16).


     Labs/Tests                                  08/16: Na 135, Cl 91.3, BUN 88


                                                 , Crea 12.3, Glu 113.


     Pertinent Medications                       08/16: Renvela, others


                                                 nutritionally unremarkable.


     Height                                      5 ft 9 in


     Weight                                      54.43 kg


     Ideal Body Weight (kg)                      72.72


     BMI                                         17.7


     Weight change and time frame                None provided at admission.


     Weight Status                               Underweight


     Subjective/Other Information                RD consult for Low BMI


                                                 assessment.


                                                 Pt's PO intake of meals has


                                                 been Fair (>50%) and fairly


                                                 tolerated, according to ADL


                                                 notes.


                                                 I will keep the dietary


                                                 supplemnets prescription to


                                                 compensate for poor or


                                                 insufficient PO intake of


                                                 meals during LOS.


                                                 Pt is on Room Air, O2


                                                 saturation @ 100%, according


                                                 to Physical Assessment History


                                                 notes.


                                                 Pt has missing teeth,


                                                 according to Physical


                                                 Assessment History notes.


                                                 Pt remains with diarrhea and


                                                 incontinent, according to


                                                 Physical Assessment History


                                                 notes.


                                                 Pt presents unspecified


                                                 dryness and flaking as signs


                                                 of concern for skin risk at


                                                 the time, according to


                                                 Physical Assessment History


                                                 notes.


                                                 Pt's Low BMI seems to


                                                 correspond to a natural body


                                                 composition, and not related


                                                 to a sudden loss of body


                                                 weight nor chronic


                                                 malnutrition, since no signs


                                                 of concern were mentioned in


                                                 the Physical Assessment


                                                 History or the Progress notes.


     Percent of energy/protein needs met:        Prescribed Renal Diet provides


                                                 for energy/protein needs (2,


                                                 072 Kcal/77 g) during LOS;


                                                 additionally, Dietary


                                                 Supplements will compensate


                                                 for possible poor or


                                                 insufficient PO intake of


                                                 meals with 1,275 Kcal and 57 g


                                                 of protein.


     Burn                                        Absent


     Trauma                                      Absent


     GI Symptoms                                 Diarrhea,Other


     Food Allergy                                No


     Skin Integrity/Comment                      Unspecified dryness and


                                                 flaking.


     Current % PO                                Fair (50-74%)


     Minimum of two criteria                     No


     Fluid Accumulation                          N/A


     Reduced  Strength                       N/A (non-severe)


     Protein-Calorie Malnutrition                N\A


     #1


      Nutrition Diagnosis                        Inadequate protein-energy


                                                 intake


      Etiology                                   Possibly associated with


                                                 Metabolic Encephalopathy.


      As Evidenced by Signs and Symptoms         Pt's PO intake of meals has


                                                 been Fair (>50%) and fairly


                                                 tolerated, according to ADL


                                                 notes.


     Is patient on ventilator?                   No


     Is Patient Ambulatory and/or Out of Bed     No


     REE-([facility]-confined to bed)      [phone]


     Kcal/Kg value to use for calculation        36


     Approximate Energy Requirements Using       1959


      kcal/Kg                                    


     Calculation Used for Recommendations        Kcal/kg


     Additional Notes                            Protein: >1.2 g/Kg ABW; >65 g/


                                                 day.


                                                 Fluids: 1 ml/Kcal, or as per


                                                 MD.


 Nutrition Intervention


     Change Diet Order:                          Continue Renal Diet as


                                                 tolerated.


     Add Supplement/Snack (indicate name/kcal    Start Nepro w/CARBSTEADY; TID.


      /protein )                                 


     Provides kCal:                              1,275


     Provides Protein (gm)                       57


     Goal #1                                     Compensate, through dietary


                                                 supplementation, for possible


                                                 poor or insufficient PO intake


                                                 of meals during LOS.


     Goal #2                                     Adjust the dietary


                                                 intervention to better serve


                                                 Pt's needs and clinical


                                                 conditions during LOS.


     Follow-Up By:                               08/23/22


     Additional Comments                         Continue monitoring food


                                                 tolerance, %PO intake of meals


                                                 , dietary supplements, and BM.

## 2022-08-18 RX ADMIN — SEVELAMER CARBONATE SCH MG: 800 TABLET, FILM COATED ORAL at 11:30

## 2022-08-18 RX ADMIN — GUAIFENESIN PRN MG: 100 SOLUTION ORAL at 17:26

## 2022-08-18 RX ADMIN — METOPROLOL TARTRATE SCH MG: 100 TABLET, FILM COATED ORAL at 21:40

## 2022-08-18 RX ADMIN — ASPIRIN SCH MG: 81 TABLET, COATED ORAL at 10:20

## 2022-08-18 RX ADMIN — SEVELAMER CARBONATE SCH MG: 800 TABLET, FILM COATED ORAL at 07:30

## 2022-08-18 RX ADMIN — INSULIN LISPRO SCH: 100 INJECTION, SOLUTION INTRAVENOUS; SUBCUTANEOUS at 11:30

## 2022-08-18 RX ADMIN — INSULIN LISPRO SCH: 100 INJECTION, SOLUTION INTRAVENOUS; SUBCUTANEOUS at 07:30

## 2022-08-18 RX ADMIN — ZOLPIDEM TARTRATE PRN MG: 5 TABLET ORAL at 02:18

## 2022-08-18 RX ADMIN — INSULIN LISPRO SCH: 100 INJECTION, SOLUTION INTRAVENOUS; SUBCUTANEOUS at 16:36

## 2022-08-18 RX ADMIN — ONDANSETRON PRN MG: 2 INJECTION INTRAMUSCULAR; INTRAVENOUS at 21:39

## 2022-08-18 RX ADMIN — GUAIFENESIN PRN MG: 100 SOLUTION ORAL at 02:18

## 2022-08-18 RX ADMIN — SEVELAMER CARBONATE SCH MG: 800 TABLET, FILM COATED ORAL at 16:30

## 2022-08-18 RX ADMIN — METOPROLOL TARTRATE SCH MG: 100 TABLET, FILM COATED ORAL at 10:20

## 2022-08-18 RX ADMIN — GUAIFENESIN PRN MG: 100 SOLUTION ORAL at 10:19

## 2022-08-18 RX ADMIN — HEPARIN SODIUM SCH UNIT: 5000 INJECTION, SOLUTION INTRAVENOUS; SUBCUTANEOUS at 21:39

## 2022-08-18 RX ADMIN — Medication SCH ML: at 21:40

## 2022-08-18 RX ADMIN — Medication SCH ML: at 10:20

## 2022-08-18 RX ADMIN — HEPARIN SODIUM SCH UNIT: 5000 INJECTION, SOLUTION INTRAVENOUS; SUBCUTANEOUS at 10:22

## 2022-08-18 NOTE — PROGRESS NOTE
Assessment and Plan


Assessment and plan: 





51-year-old  seizure disorder hypertension type 2 diabetes and end-stage 

renal disease on hemodialysis presents with lethargy and confusion.  Family 

brought him to the emergency room stating that he is confused not oriented.  His

last known dialysis is not known.  Improvement on fluids.  No fever or chills.


No seizures.  Noncompliant with his medications.





hospital course:


8/14: AOx4 on bedside encounter. Transfused 1 unit due to hgb 6.8. Patient 

stated he has now missed several dialysis sessions. He goes to [facility] per patient. will place CM consultation to ensure patient still has 

chair. Anticipate d/c tomorrow.





8/15: Hgb improved to 7.7. Will work with CM regarding dialysis chair as patient

prior chair due to noncompliance PTA.





8/16: Await case management decision regarding outpatient hemodialysis





8/17: Physical therapy recommends SNF placement.  We will discuss with case 

management disposition.  Continue hemodialysis per nephrology recommendations.





8/18: Awaiting for arrangements for hemodialysis associated with skilled nursing

facility or outpatient hemodialysis chair.  I discussed the case with case 

management





Assessment and Plan:


(1) Acute metabolic encephalopathy (resolved)


Current Visit: Yes   Status: Acute   


Plan to address problem: 


Secondary to severe uremia.  His creatinine is 32.1 and BUN is 242.  Family does

not know how many days he has missed his hemodialysis.  


Patient to be counseled about compliance once he  is more alert and oriented.








(2) Hyperkalemia (improved)


Current Visit: Yes   Status: Acute   


Plan to address problem: 


Treated in the emergency room and patient going for hemodialysis


Correct with HD








(3) Metabolic acidosis (resolved)


Current Visit: Yes   Status: Acute   


Plan to address problem: 


Secondary to severe uremia.








(4) Seizure disorder


Current Visit: Yes   Status: Chronic   


Plan to address problem: 


Continue Keppra.








(5) T2DM (type 2 diabetes mellitus)


Current Visit: Yes   Status: Chronic   


Qualifiers: 


   Diabetes mellitus long term insulin use: unspecified long term insulin use 

status   Chronic kidney disease stage: on chronic dialysis 


Plan to address problem: 


Coverage for now








(6) ESRD needing dialysis


Current Visit: Yes   Status: Acute   


Plan to address problem: 


Continue hemodialysis


Nephrology following


Patient to be counseled about compliance








(7) Anemia


Current Visit: No   Status: Chronic   


Qualifiers: 


   Anemia type: due to chronic kidney disease 


Plan to address problem: 


Secondary to chronic kidney disease


Epogen as per nephrology


1 unit prbc given on 8/14.


Hgb and VSS currently stable.








(8) Hypertension


Current Visit: Yes   Status: Chronic   


Qualifiers: 


   Hypertension type: primary hypertension   Qualified Code(s): I10 - Essential 

(primary) hypertension   


Plan to address problem: 


Continue antihypertensives








(9) Malnutrition


Current Visit: Yes   Status: Chronic   


Qualifiers: 


   Malnutrition type: protein-calorie malnutrition   Protein-calorie 

malnutrition severity: severe   Qualified Code(s): E43 - Unspecified severe 

protein-calorie malnutrition   


Plan to address problem: 


Dietary supplements initiated


Dietitian consult requested











History


Interval history: 





No new issues overnight





Hospitalist Physical





- Constitutional


Vitals: 


                                        











Temp Pulse Resp BP Pulse Ox


 


 98.2 F   78   19   113/64   100 


 


 08/17/22 23:22  08/17/22 23:40  08/17/22 23:22  08/17/22 23:40  08/17/22 23:22











General appearance: Present: no acute distress, well-nourished





- EENT


Eyes: Present: PERRL, EOM intact


ENT: hearing intact, clear oral mucosa, dentition normal





- Neck


Neck: Present: supple, normal ROM





- Respiratory


Respiratory effort: normal


Respiratory: bilateral: CTA





- Cardiovascular


Rhythm: regular


Heart Sounds: Present: S1 & S2.  Absent: gallop, rub





- Extremities


Extremities: no ischemia, No edema, Full ROM





- Abdominal


General gastrointestinal: soft, non-tender, non-distended, normal bowel sounds





- Integumentary


Integumentary: Present: clear, warm, dry





- Neurologic


Neurologic: CNII-XII intact, moves all extremities





HEART Score





- HEART Score


Troponin: 


                                        











Troponin T  0.933 ng/mL (0.00-0.029)  H*  08/13/22  01:28    














Results





- Labs


CBC & Chem 7: 


                                 08/17/22 05:05





                                 08/17/22 05:05


Labs: 


                             Laboratory Last Values











WBC  8.4 K/mm3 (4.5-11.0)   08/17/22  05:05    


 


RBC  2.82 M/mm3 (3.65-5.03)  L  08/17/22  05:05    


 


Hgb  8.3 gm/dl (11.8-15.2)  L  08/17/22  05:05    


 


Hct  25.0 % (35.5-45.6)  L  08/17/22  05:05    


 


MCV  88 fl (84-94)   08/17/22  05:05    


 


MCH  30 pg (28-32)   08/17/22  05:05    


 


MCHC  33 % (32-34)   08/17/22  05:05    


 


RDW  16.3 % (13.2-15.2)  H  08/17/22  05:05    


 


Plt Count  258 K/mm3 (140-440)   08/17/22  05:05    


 


Lymph % (Auto)  6.2 % (13.4-35.0)  L  08/17/22  05:05    


 


Mono % (Auto)  10.1 % (0.0-7.3)  H  08/17/22  05:05    


 


Eos % (Auto)  3.0 % (0.0-4.3)   08/17/22  05:05    


 


Baso % (Auto)  0.3 % (0.0-1.8)   08/17/22  05:05    


 


Lymph # (Auto)  0.5 K/mm3 (1.2-5.4)  L  08/17/22  05:05    


 


Mono # (Auto)  0.8 K/mm3 (0.0-0.8)   08/17/22  05:05    


 


Eos # (Auto)  0.3 K/mm3 (0.0-0.4)   08/17/22  05:05    


 


Baso # (Auto)  0.0 K/mm3 (0.0-0.1)   08/17/22  05:05    


 


Seg Neutrophils %  80.4 % (40.0-70.0)  H  08/17/22  05:05    


 


Seg Neutrophils #  6.8 K/mm3 (1.8-7.7)   08/17/22  05:05    


 


PT  17.0 Sec. (12.2-14.9)  H  08/13/22  01:28    


 


INR  1.23  (0.87-1.13)  H  08/13/22  01:28    


 


Sodium  137 mmol/L (137-145)   08/17/22  05:05    


 


Potassium  4.6 mmol/L (3.6-5.0)   08/17/22  05:05    


 


Chloride  92.2 mmol/L ()  L  08/17/22  05:05    


 


Carbon Dioxide  27 mmol/L (22-30)   08/17/22  05:05    


 


Anion Gap  22 mmol/L  08/17/22  05:05    


 


BUN  107 mg/dL (9-20)  H  08/17/22  05:05    


 


Creatinine  13.7 mg/dL (0.8-1.3)  H  08/17/22  05:05    


 


Estimated GFR  5 ml/min  08/17/22  05:05    


 


BUN/Creatinine Ratio  8 %  08/17/22  05:05    


 


Glucose  181 mg/dL ()  H  08/17/22  05:05    


 


POC Glucose  130 mg/dL ()  H  08/18/22  11:09    


 


Calcium  9.0 mg/dL (8.4-10.2)   08/17/22  05:05    


 


Phosphorus  10.10 mg/dL (2.5-4.5)  H  08/14/22  07:22    


 


Magnesium  2.20 mg/dL (1.7-2.3)   08/14/22  07:22    


 


Total Bilirubin  0.30 mg/dL (0.1-1.2)   08/15/22  04:23    


 


AST  16 units/L (5-40)   08/15/22  04:23    


 


ALT  16 units/L (7-56)   08/15/22  04:23    


 


Alkaline Phosphatase  60 units/L ()   08/15/22  04:23    


 


Total Creatine Kinase  1147 units/L ()  H  08/13/22  01:28    


 


Troponin T  0.933 ng/mL (0.00-0.029)  H*  08/13/22  01:28    


 


Total Protein  6.3 g/dL (6.3-8.2)   08/15/22  04:23    


 


Albumin  2.4 g/dL (3.9-5)  L  08/15/22  04:23    


 


Albumin/Globulin Ratio  0.6 %  08/15/22  04:23    


 


Triglycerides  178 mg/dL (2-149)  H  08/13/22  01:28    


 


Cholesterol  112 mg/dL ()   08/13/22  01:28    


 


LDL Cholesterol Direct  30 mg/dL ()  L  08/13/22  01:28    


 


HDL Cholesterol  39 mg/dL (40-59)  L  08/13/22  01:28    


 


Cholesterol/HDL Ratio  2.87 %  08/13/22  01:28    


 


Salicylates  < 0.3 mg/dL (2.8-20.0)  L  08/13/22  01:28    


 


Acetaminophen  5.0 ug/mL (10.0-30.0)  L  08/13/22  01:28    


 


Plasma/Serum Alcohol  < 0.01 % (0-0.07)   08/13/22  01:28    


 


Coronavirus (PCR)  Negative  (Negative)   08/14/22  Unknown


 


SARS-CoV-2 (PCR)  Negative  (Negative)   08/15/22  11:05    


 


Hepatitis A IgM Ab  Non-reactive  (NonReactive)   08/13/22  01:28    


 


Hep Bs Antigen  Non-reactive  (Negative)   08/13/22  01:28    


 


Hep B Core IgM Ab  Non-reactive  (NonReactive)   08/13/22  01:28    


 


Hepatitis C Antibody  Non-reactive  (NonReactive)   08/13/22  01:28    


 


Blood Type  A POSITIVE   08/14/22  10:00    


 


Antibody Screen  Negative   08/14/22  10:00    


 


Crossmatch  See Detail   08/14/22  10:00    











Petty/IV: 


                                        





Voiding Method                   Condom Catheter











Active Medications





- Current Medications


Current Medications: 














Generic Name Dose Route Start Last Admin





  Trade Name Freq  PRN Reason Stop Dose Admin


 


Acetaminophen  650 mg  08/13/22 19:48 





  Acetaminophen 325 Mg Tab  PO  





  Q4H PRN  





  Pain MILD(1-3)/Fever >100.5/HA  


 


Aspirin  81 mg  08/14/22 10:00  08/18/22 10:20





  Aspirin Ec 81 Mg Tab  PO   81 mg





  QDAY LASHA   Administration


 


Epoetin Jesus-epbx  20,000 unit  08/16/22 21:57 





  Epoetin Jesus-Epbx 10,000 Unit/1 Ml Vial  SUB-Q  





  EUSEBIA PRN  





  hemodialysis  


 


Guaifenesin  200 mg  08/18/22 01:57  08/18/22 10:19





  Guaifenesin 100 Mg/5 Ml Oral Liqd  PO   200 mg





  Q4H PRN   Administration





  Cough  


 


Heparin Sodium (Porcine)  5,000 unit  08/13/22 22:00  08/18/22 10:22





  Heparin 5,000 Unit/1 Ml Vial  SUB-Q   5,000 unit





  Q12HR LASHA   Administration


 


Hydromorphone HCl  0.5 mg  08/13/22 19:48 





  Hydromorphone 0.5 Mg/0.5 Ml Inj  IV  





  Q3H PRN  





  Pain , Severe (7-10)  


 


Sodium Chloride  100 mls @ 999 mls/hr  08/14/22 12:00 





  Nacl 0.9%  IV  





  EUSEBIA PRN  





  Hypotension  


 


Insulin Human Lispro  0 unit  08/14/22 07:30  08/18/22 11:30





  Insulin Lispro 100 Unit/Ml  SUB-Q   Not Given





  [facility]  





  Protocol  


 


Levetiracetam  500 mg  08/14/22 10:00  08/18/22 10:20





  Levetiracetam 500 Mg Tab  PO   500 mg





  BID LASHA   Administration


 


Metoclopramide HCl  5 mg  08/16/22 09:00 





  Metoclopramide 10 Mg/2 Ml Inj  IV  





  Q8H PRN  





  Nausea And Vomiting  


 


Metoprolol Tartrate  100 mg  08/14/22 10:00  08/18/22 10:20





  Metoprolol Tartrate 100 Mg Tab  PO   100 mg





  BID LASHA   Administration


 


Morphine Sulfate  2 mg  08/13/22 19:48 





  Morphine 2 Mg/1 Ml Inj  IV  





  Q4H PRN  





  Pain, Moderate (4-6)  


 


Ondansetron HCl  4 mg  08/13/22 19:48 





  Ondansetron 4 Mg/2 Ml Inj  IV  





  Q8H PRN  





  Nausea And Vomiting  


 


Oxycodone/Acetaminophen  1 tab  08/13/22 19:48 





  Oxycodone /Acetaminophen 5-325mg Tab  PO  





  Q6H PRN  





  Pain, Moderate (4-6)  


 


Sevelamer Carbonate  2,400 mg  08/14/22 12:30  08/18/22 07:30





  Sevelamer Carbonate 800 Mg Tab  PO   2,400 mg





  AC LASHA   Administration


 


Sodium Chloride  10 ml  08/13/22 22:00  08/18/22 10:20





  Sodium Chloride 0.9% 10 Ml Flush Syringe  IV   10 ml





  BID LASHA   Administration


 


Sodium Chloride  10 ml  08/13/22 19:48 





  Sodium Chloride 0.9% 10 Ml Flush Syringe  IV  





  PRN PRN  





  LINE FLUSH  


 


Zolpidem Tartrate  5 mg  08/18/22 01:56  08/18/22 02:18





  Zolpidem 5 Mg Tab  PO   5 mg





  QHS PRN   Administration





  Sleep  














Nutrition/Malnutrition Assess





- Dietary Evaluation


Nutrition/Malnutrition Findings: 


                                        





Nutrition Notes                                            Start:  08/16/22 15

:24


Freq:                                                      Status: Active       




Protocol:                                                                       




 Document     08/16/22 15:24  GLORIA  (Rec: 08/16/22 15:47  GLOIRA  [id number])


 Nutrition Notes


     Need for Assessment generated from:         Low BMI


     Initial or Follow up                        Assessment


     Current Diagnosis                           CKD (stage V CKD),Diabetes,


                                                 Hypertension,Malnutrition


     Other Pertinent Diagnosis                   ESRD+HD, Seizure Disorder,


                                                 Anemia, s/p Metabolic


                                                 Encephalopathy.


     Current Diet                                Renal Diet (since D 08/13), D


                                                 Suppl (from D 08/16).


     Labs/Tests                                  08/16: Na 135, Cl 91.3, BUN 88


                                                 , Crea 12.3, Glu 113.


     Pertinent Medications                       08/16: Renvela, others


                                                 nutritionally unremarkable.


     Height                                      5 ft 9 in


     Weight                                      54.43 kg


     Ideal Body Weight (kg)                      72.72


     BMI                                         17.7


     Weight change and time frame                None provided at admission.


     Weight Status                               Underweight


     Subjective/Other Information                RD consult for Low BMI


                                                 assessment.


                                                 Pt's PO intake of meals has


                                                 been Fair (>50%) and fairly


                                                 tolerated, according to ADL


                                                 notes.


                                                 I will keep the dietary


                                                 supplemnets prescription to


                                                 compensate for poor or


                                                 insufficient PO intake of


                                                 meals during LOS.


                                                 Pt is on Room Air, O2


                                                 saturation @ 100%, according


                                                 to Physical Assessment History


                                                 notes.


                                                 Pt has missing teeth,


                                                 according to Physical


                                                 Assessment History notes.


                                                 Pt remains with diarrhea and


                                                 incontinent, according to


                                                 Physical Assessment History


                                                 notes.


                                                 Pt presents unspecified


                                                 dryness and flaking as signs


                                                 of concern for skin risk at


                                                 the time, according to


                                                 Physical Assessment History


                                                 notes.


                                                 Pt's Low BMI seems to


                                                 correspond to a natural body


                                                 composition, and not related


                                                 to a sudden loss of body


                                                 weight nor chronic


                                                 malnutrition, since no signs


                                                 of concern were mentioned in


                                                 the Physical Assessment


                                                 History or the Progress notes.


     Percent of energy/protein needs met:        Prescribed Renal Diet provides


                                                 for energy/protein needs (2,


                                                 072 Kcal/77 g) during LOS;


                                                 additionally, Dietary


                                                 Supplements will compensate


                                                 for possible poor or


                                                 insufficient PO intake of


                                                 meals with 1,275 Kcal and 57 g


                                                 of protein.


     Burn                                        Absent


     Trauma                                      Absent


     GI Symptoms                                 Diarrhea,Other


     Food Allergy                                No


     Skin Integrity/Comment                      Unspecified dryness and


                                                 flaking.


     Current % PO                                Fair (50-74%)


     Minimum of two criteria                     No


     Fluid Accumulation                          N/A


     Reduced  Strength                       N/A (non-severe)


     Protein-Calorie Malnutrition                N\A


     #1


      Nutrition Diagnosis                        Inadequate protein-energy


                                                 intake


      Etiology                                   Possibly associated with


                                                 Metabolic Encephalopathy.


      As Evidenced by Signs and Symptoms         Pt's PO intake of meals has


                                                 been Fair (>50%) and fairly


                                                 tolerated, according to ADL


                                                 notes.


     Is patient on ventilator?                   No


     Is Patient Ambulatory and/or Out of Bed     No


     REE-([facility]-confined to bed)      [phone]


     Kcal/Kg value to use for calculation        36


     Approximate Energy Requirements Using       1959


      kcal/Kg                                    


     Calculation Used for Recommendations        Kcal/kg


     Additional Notes                            Protein: >1.2 g/Kg ABW; >65 g/


                                                 day.


                                                 Fluids: 1 ml/Kcal, or as per


                                                 MD.


 Nutrition Intervention


     Change Diet Order:                          Continue Renal Diet as


                                                 tolerated.


     Add Supplement/Snack (indicate name/kcal    Start Nepro w/CARBSTEADY; TID.


      /protein )                                 


     Provides kCal:                              1,275


     Provides Protein (gm)                       57


     Goal #1                                     Compensate, through dietary


                                                 supplementation, for possible


                                                 poor or insufficient PO intake


                                                 of meals during LOS.


     Goal #2                                     Adjust the dietary


                                                 intervention to better serve


                                                 Pt's needs and clinical


                                                 conditions during LOS.


     Follow-Up By:                               08/23/22


     Additional Comments                         Continue monitoring food


                                                 tolerance, %PO intake of meals


                                                 , dietary supplements, and BM.

## 2022-08-18 NOTE — PROGRESS NOTE
Assessment and Plan


1. ESRD:


Patient is on maintenance HD.


Last outpatient HD unknown.


Meds dosage based on GFR.


Hemodialysis: 6/13, 6/15, 6/17.





2. FEN:


Hyperkalemia, improved, on HD.


Hypernatremia, improved.


Binders.


Monitor lytes and volume status.





3. Acute Metabolic Encephalopathy, POA:


Suspected Uremic encephalopathy.


Monitor.





4. Seizure:


On Keppra.





5. Normocytic anemia, POA:


S/p PRBC.


Epogen with HD.


Monitor.





6. T2DM (type 2 diabetes mellitus):


SSI.











Subjective:


Patient was seen and examined at the bedside.


Nurse at the bedside.











Examination:


General appearance: well-developed, emaciated, no distress


HEENT: ATNC, pupils equal


Neck: trachea midline


Respiratory: Clear to Auscultation


Cardiology: regular, S1S2, no murmur


Gastrointestinal: soft, normoactive bowel sounds, not tender, ND


Integumentary: diffuse discrete papular rash, fading


Neurologic: alert, tracking thru eyes, not following command


Ext: no edema noted


Hemodialysis access: L arm AVF/AVG





Subjective


Date of service: 08/18/22





Objective





- Vital Signs


Vital signs: 


                               Vital Signs - 12hr











  08/17/22 08/17/22 08/17/22





  22:00 23:22 23:40


 


Temperature  98.2 F 


 


Pulse Rate  78 78


 


Respiratory  19 





Rate   


 


Blood Pressure  113/64 113/64


 


O2 Sat by Pulse 100 100 





Oximetry   














- Lab





                                 08/17/22 05:05





                                 08/17/22 05:05


                             Most recent lab results











Calcium  9.0 mg/dL (8.4-10.2)   08/17/22  05:05    


 


Phosphorus  10.10 mg/dL (2.5-4.5)  H  08/14/22  07:22    


 


Magnesium  2.20 mg/dL (1.7-2.3)   08/14/22  07:22    














Medications & Allergies





- Medications


Allergies/Adverse Reactions: 


                                    Allergies





No Known Allergies Allergy (Unverified 06/07/22 18:10)


   








Home Medications: 


                                Home Medications











 Medication  Instructions  Recorded  Confirmed  Last Taken  Type


 


Aspirin EC [Halfprin EC] 81 mg PO QDAY #30 tablet. 06/13/22 08/18/22 Unknown 

Rx


 


Metoprolol [Lopressor TAB] 100 mg PO BID #60 tablet 06/13/22 08/18/22 Unknown Rx


 


levETIRAcetam [Keppra TAB] 500 mg PO BID #60 tablet 06/13/22 08/18/22 Unknown Rx











Active Medications: 














Generic Name Dose Route Start Last Admin





  Trade Name Freq  PRN Reason Stop Dose Admin


 


Acetaminophen  650 mg  08/13/22 19:48 





  Acetaminophen 325 Mg Tab  PO  





  Q4H PRN  





  Pain MILD(1-3)/Fever >100.5/HA  


 


Aspirin  81 mg  08/14/22 10:00  08/17/22 09:10





  Aspirin Ec 81 Mg Tab  PO   81 mg





  QDAY LASHA   Administration


 


Epoetin Jesus-epbx  20,000 unit  08/16/22 21:57 





  Epoetin Jesus-Epbx 10,000 Unit/1 Ml Vial  SUB-Q  





  EUSEBIA PRN  





  hemodialysis  


 


Guaifenesin  200 mg  08/18/22 01:57  08/18/22 02:18





  Guaifenesin 100 Mg/5 Ml Oral Liqd  PO   200 mg





  Q4H PRN   Administration





  Cough  


 


Heparin Sodium (Porcine)  5,000 unit  08/13/22 22:00  08/17/22 22:25





  Heparin 5,000 Unit/1 Ml Vial  SUB-Q   5,000 unit





  Q12HR LASHA   Administration


 


Hydromorphone HCl  0.5 mg  08/13/22 19:48 





  Hydromorphone 0.5 Mg/0.5 Ml Inj  IV  





  Q3H PRN  





  Pain , Severe (7-10)  


 


Sodium Chloride  100 mls @ 999 mls/hr  08/14/22 12:00 





  Nacl 0.9%  IV  





  EUSEBIA PRN  





  Hypotension  


 


Insulin Human Lispro  0 unit  08/14/22 07:30  08/17/22 22:24





  Insulin Lispro 100 Unit/Ml  SUB-Q   Not Given





  [facility]  





  Protocol  


 


Levetiracetam  500 mg  08/14/22 10:00  08/17/22 21:29





  Levetiracetam 500 Mg Tab  PO   500 mg





  BID LAHSA   Administration


 


Metoclopramide HCl  5 mg  08/16/22 09:00 





  Metoclopramide 10 Mg/2 Ml Inj  IV  





  Q8H PRN  





  Nausea And Vomiting  


 


Metoprolol Tartrate  100 mg  08/14/22 10:00  08/17/22 23:40





  Metoprolol Tartrate 100 Mg Tab  PO   100 mg





  BID LASHA   Administration


 


Morphine Sulfate  2 mg  08/13/22 19:48 





  Morphine 2 Mg/1 Ml Inj  IV  





  Q4H PRN  





  Pain, Moderate (4-6)  


 


Ondansetron HCl  4 mg  08/13/22 19:48 





  Ondansetron 4 Mg/2 Ml Inj  IV  





  Q8H PRN  





  Nausea And Vomiting  


 


Oxycodone/Acetaminophen  1 tab  08/13/22 19:48 





  Oxycodone /Acetaminophen 5-325mg Tab  PO  





  Q6H PRN  





  Pain, Moderate (4-6)  


 


Sevelamer Carbonate  2,400 mg  08/14/22 12:30  08/17/22 16:34





  Sevelamer Carbonate 800 Mg Tab  PO   2,400 mg





  AC LASHA   Administration


 


Sodium Chloride  10 ml  08/13/22 22:00  08/17/22 21:30





  Sodium Chloride 0.9% 10 Ml Flush Syringe  IV   10 ml





  BID LASHA   Administration


 


Sodium Chloride  10 ml  08/13/22 19:48 





  Sodium Chloride 0.9% 10 Ml Flush Syringe  IV  





  PRN PRN  





  LINE FLUSH  


 


Zolpidem Tartrate  5 mg  08/18/22 01:56  08/18/22 02:18





  Zolpidem 5 Mg Tab  PO   5 mg





  QHS PRN   Administration





  Sleep

## 2022-08-19 LAB
BUN SERPL-MCNC: 71 MG/DL (ref 9–20)
BUN/CREAT SERPL: 7 %
CALCIUM SERPL-MCNC: 8.3 MG/DL (ref 8.4–10.2)
HEMOLYSIS INDEX: 7

## 2022-08-19 PROCEDURE — 5A1D70Z PERFORMANCE OF URINARY FILTRATION, INTERMITTENT, LESS THAN 6 HOURS PER DAY: ICD-10-PCS | Performed by: INTERNAL MEDICINE

## 2022-08-19 RX ADMIN — INSULIN LISPRO SCH: 100 INJECTION, SOLUTION INTRAVENOUS; SUBCUTANEOUS at 07:30

## 2022-08-19 RX ADMIN — ONDANSETRON PRN MG: 2 INJECTION INTRAMUSCULAR; INTRAVENOUS at 21:56

## 2022-08-19 RX ADMIN — Medication SCH ML: at 22:00

## 2022-08-19 RX ADMIN — HEPARIN SODIUM SCH: 5000 INJECTION, SOLUTION INTRAVENOUS; SUBCUTANEOUS at 10:00

## 2022-08-19 RX ADMIN — Medication SCH: at 10:00

## 2022-08-19 RX ADMIN — INSULIN LISPRO SCH: 100 INJECTION, SOLUTION INTRAVENOUS; SUBCUTANEOUS at 11:30

## 2022-08-19 RX ADMIN — ASPIRIN SCH: 81 TABLET, COATED ORAL at 10:00

## 2022-08-19 RX ADMIN — METOPROLOL TARTRATE SCH MG: 100 TABLET, FILM COATED ORAL at 21:56

## 2022-08-19 RX ADMIN — INSULIN LISPRO SCH: 100 INJECTION, SOLUTION INTRAVENOUS; SUBCUTANEOUS at 22:00

## 2022-08-19 RX ADMIN — SEVELAMER CARBONATE SCH MG: 800 TABLET, FILM COATED ORAL at 16:30

## 2022-08-19 RX ADMIN — INSULIN LISPRO SCH: 100 INJECTION, SOLUTION INTRAVENOUS; SUBCUTANEOUS at 05:37

## 2022-08-19 RX ADMIN — HEPARIN SODIUM SCH UNIT: 5000 INJECTION, SOLUTION INTRAVENOUS; SUBCUTANEOUS at 21:56

## 2022-08-19 RX ADMIN — INSULIN LISPRO SCH: 100 INJECTION, SOLUTION INTRAVENOUS; SUBCUTANEOUS at 16:30

## 2022-08-19 RX ADMIN — SEVELAMER CARBONATE SCH: 800 TABLET, FILM COATED ORAL at 07:30

## 2022-08-19 RX ADMIN — SEVELAMER CARBONATE SCH: 800 TABLET, FILM COATED ORAL at 11:30

## 2022-08-19 RX ADMIN — METOPROLOL TARTRATE SCH: 100 TABLET, FILM COATED ORAL at 10:00

## 2022-08-19 NOTE — PROGRESS NOTE
Assessment and Plan


1. ESRD:


Patient is on maintenance HD.


Last outpatient HD unknown.


Meds dosage based on GFR.


Hemodialysis: 6/13, 6/15, 6/17, 6/19.





2. FEN:


Hyperkalemia, improved, on HD.


Hypernatremia, improved.


Binders.


Monitor lytes and volume status.





3. Acute Metabolic Encephalopathy, POA:


Suspected Uremic encephalopathy.


Baseline cognitive decline.


Improving, monitor.





4. Seizure:


On Keppra.





5. Normocytic anemia, POA:


S/p PRBC.


Epogen with HD.


Monitor.





6. T2DM (type 2 diabetes mellitus):


SSI.











Subjective:


Patient was seen and examined at the bedside.


HD Nurse at the bedside.











Examination:


General appearance: well-developed, emaciated, no distress


HEENT: ATNC, pupils equal


Neck: trachea midline


Respiratory: Clear to Auscultation


Cardiology: regular, S1S2, no murmur


Gastrointestinal: soft, normoactive bowel sounds, not tender, ND


Integumentary: diffuse discrete papular rash, fading


Neurologic: alert, oriented to self, able to move extremities


Ext: no edema noted


Hemodialysis access: L arm AVF/AVG





Subjective


Date of service: 08/19/22





Objective





- Vital Signs


Vital signs: 


                               Vital Signs - 12hr











  08/18/22 08/18/22 08/19/22





  21:40 22:00 04:29


 


Temperature   97.7 F


 


Pulse Rate 77  71


 


Respiratory   18





Rate   


 


Respiratory  17 





Rate [Penis]   


 


Blood Pressure 125/67  122/64


 


O2 Sat by Pulse  97 99





Oximetry   














- Lab





                                 08/17/22 05:05





                                 08/19/22 07:45


                             Most recent lab results











Calcium  8.3 mg/dL (8.4-10.2)  L  08/19/22  07:45    


 


Phosphorus  8.30 mg/dL (2.5-4.5)  H  08/19/22  07:45    


 


Magnesium  2.20 mg/dL (1.7-2.3)   08/14/22  07:22    














Medications & Allergies





- Medications


Allergies/Adverse Reactions: 


                                    Allergies





No Known Allergies Allergy (Unverified 06/07/22 18:10)


   








Home Medications: 


                                Home Medications











 Medication  Instructions  Recorded  Confirmed  Last Taken  Type


 


Aspirin EC [Halfprin EC] 81 mg PO QDAY #30 tablet. 06/13/22 08/18/22 Unknown 

Rx


 


Metoprolol [Lopressor TAB] 100 mg PO BID #60 tablet 06/13/22 08/18/22 Unknown Rx


 


levETIRAcetam [Keppra TAB] 500 mg PO BID #60 tablet 06/13/22 08/18/22 Unknown Rx











Active Medications: 














Generic Name Dose Route Start Last Admin





  Trade Name Freq  PRN Reason Stop Dose Admin


 


Acetaminophen  650 mg  08/13/22 19:48 





  Acetaminophen 325 Mg Tab  PO  





  Q4H PRN  





  Pain MILD(1-3)/Fever >100.5/HA  


 


Aspirin  81 mg  08/14/22 10:00  08/18/22 10:20





  Aspirin Ec 81 Mg Tab  PO   81 mg





  QDAY LASHA   Administration


 


Epoetin Jesus-epbx  20,000 unit  08/16/22 21:57 





  Epoetin Jesus-Epbx 10,000 Unit/1 Ml Vial  SUB-Q  





  EUSEBIA PRN  





  hemodialysis  


 


Guaifenesin  200 mg  08/18/22 01:57  08/18/22 17:26





  Guaifenesin 100 Mg/5 Ml Oral Liqd  PO   200 mg





  Q4H PRN   Administration





  Cough  


 


Heparin Sodium (Porcine)  5,000 unit  08/13/22 22:00  08/18/22 21:39





  Heparin 5,000 Unit/1 Ml Vial  SUB-Q   5,000 unit





  Q12HR LASHA   Administration


 


Hydromorphone HCl  0.5 mg  08/13/22 19:48 





  Hydromorphone 0.5 Mg/0.5 Ml Inj  IV  





  Q3H PRN  





  Pain , Severe (7-10)  


 


Sodium Chloride  100 mls @ 999 mls/hr  08/19/22 08:23 





  Nacl 0.9%  IV  





  EUSEBIA PRN  





  Hypotension  


 


Insulin Human Lispro  0 unit  08/14/22 07:30  08/19/22 05:37





  Insulin Lispro 100 Unit/Ml  SUB-Q   Not Given





  [facility]  





  Protocol  


 


Levetiracetam  500 mg  08/14/22 10:00  08/18/22 21:39





  Levetiracetam 500 Mg Tab  PO   500 mg





  BID LASHA   Administration


 


Metoclopramide HCl  5 mg  08/16/22 09:00 





  Metoclopramide 10 Mg/2 Ml Inj  IV  





  Q8H PRN  





  Nausea And Vomiting  


 


Metoprolol Tartrate  100 mg  08/14/22 10:00  08/18/22 21:40





  Metoprolol Tartrate 100 Mg Tab  PO   100 mg





  BID LASHA   Administration


 


Morphine Sulfate  2 mg  08/13/22 19:48 





  Morphine 2 Mg/1 Ml Inj  IV  





  Q4H PRN  





  Pain, Moderate (4-6)  


 


Ondansetron HCl  4 mg  08/13/22 19:48  08/18/22 21:39





  Ondansetron 4 Mg/2 Ml Inj  IV   4 mg





  Q8H PRN   Administration





  Nausea And Vomiting  


 


Oxycodone/Acetaminophen  1 tab  08/13/22 19:48 





  Oxycodone /Acetaminophen 5-325mg Tab  PO  





  Q6H PRN  





  Pain, Moderate (4-6)  


 


Sevelamer Carbonate  2,400 mg  08/14/22 12:30  08/18/22 16:30





  Sevelamer Carbonate 800 Mg Tab  PO   2,400 mg





  AC LASHA   Administration


 


Sodium Chloride  10 ml  08/13/22 22:00  08/18/22 21:40





  Sodium Chloride 0.9% 10 Ml Flush Syringe  IV   10 ml





  BID LASHA   Administration


 


Sodium Chloride  10 ml  08/13/22 19:48 





  Sodium Chloride 0.9% 10 Ml Flush Syringe  IV  





  PRN PRN  





  LINE FLUSH  


 


Zolpidem Tartrate  5 mg  08/18/22 01:56  08/18/22 02:18





  Zolpidem 5 Mg Tab  PO   5 mg





  [facility] PRN   Administration





  Sleep

## 2022-08-19 NOTE — PROGRESS NOTE
Assessment and Plan


Assessment and plan: 





51-year-old  seizure disorder hypertension type 2 diabetes and end-stage 

renal disease on hemodialysis presents with lethargy and confusion.  Family 

brought him to the emergency room stating that he is confused not oriented.  His

last known dialysis is not known.  Improvement on fluids.  No fever or chills.


No seizures.  Noncompliant with his medications.











Assessment and Plan:


(1) Acute metabolic encephalopathy (resolved)


Current Visit: Yes   Status: Acute   


Plan to address problem: 


Secondary to severe uremia.  His creatinine is 32.1 and BUN is 242.  Family does

not know how many days he has missed his hemodialysis.  


Patient to be counseled about compliance once he  is more alert and oriented.








(2) Hyperkalemia (improved)


Current Visit: Yes   Status: Acute   


Plan to address problem: 


Treated in the emergency room and patient going for hemodialysis


Correct with HD








(3) Metabolic acidosis (resolved)


Current Visit: Yes   Status: Acute   


Plan to address problem: 


Secondary to severe uremia.








(4) Seizure disorder


Current Visit: Yes   Status: Chronic   


Plan to address problem: 


Continue Keppra.








(5) T2DM (type 2 diabetes mellitus)


Current Visit: Yes   Status: Chronic   


Qualifiers: 


   Diabetes mellitus long term insulin use: unspecified long term insulin use 

status   Chronic kidney disease stage: on chronic dialysis 


Plan to address problem: 


Coverage for now








(6) ESRD needing dialysis


Current Visit: Yes   Status: Acute   


Plan to address problem: 


Continue hemodialysis


Nephrology following


Patient to be counseled about compliance








(7) Anemia


Current Visit: No   Status: Chronic   


Qualifiers: 


   Anemia type: due to chronic kidney disease 


Plan to address problem: 


Secondary to chronic kidney disease


Epogen as per nephrology


1 unit prbc given on 8/14.


Hgb and VSS currently stable.








(8) Hypertension


Current Visit: Yes   Status: Chronic   


Qualifiers: 


   Hypertension type: primary hypertension   Qualified Code(s): I10 - Essential 

(primary) hypertension   


Plan to address problem: 


Continue antihypertensives








(9) Malnutrition


Current Visit: Yes   Status: Chronic   


Qualifiers: 


   Malnutrition type: protein-calorie malnutrition   Protein-calorie 

malnutrition severity: severe   Qualified Code(s): E43 - Unspecified severe 

protein-calorie malnutrition   


Plan to address problem: 


Dietary supplements initiated


Dietitian consult requested





hospital course:


8/14: AOx4 on bedside encounter. Transfused 1 unit due to hgb 6.8. Patient st

ated he has now missed several dialysis sessions. He goes to [facility] 

[place] per patient. will place CM consultation to ensure patient still has 

chair. Anticipate d/c tomorrow.





8/15: Hgb improved to 7.7. Will work with CM regarding dialysis chair as patient

prior chair due to noncompliance PTA.





8/16: Await case management decision regarding outpatient hemodialysis





8/17: Physical therapy recommends SNF placement.  We will discuss with case 

management disposition.  Continue hemodialysis per nephrology recommendations.





8/18: Awaiting for arrangements for hemodialysis associated with skilled nursing

facility or outpatient hemodialysis chair.  I discussed the case with case 

management





8/19:   Awaiting for arrangements for hemodialysis associated with skilled 

nursing facility or outpatient hemodialysis chair.  Patient is less confused and

appears to be back to baseline.





History


Interval history: 





No new issues overnight





Hospitalist Physical





- Constitutional


Vitals: 


                                        











Temp Pulse Resp BP Pulse Ox


 


 97.7 F   71   18   122/64   99 


 


 08/19/22 04:29  08/19/22 04:29  08/19/22 04:29  08/19/22 04:29  08/19/22 04:29











General appearance: Present: no acute distress, well-nourished





- EENT


Eyes: Present: PERRL, EOM intact


ENT: hearing intact, clear oral mucosa, dentition normal





- Neck


Neck: Present: supple, normal ROM





- Respiratory


Respiratory effort: normal


Respiratory: bilateral: CTA





- Cardiovascular


Rhythm: regular


Heart Sounds: Present: S1 & S2.  Absent: gallop, rub





- Extremities


Extremities: no ischemia, No edema, Full ROM





- Abdominal


General gastrointestinal: soft, non-tender, non-distended, normal bowel sounds





- Integumentary


Integumentary: Present: clear, warm, dry





- Neurologic


Neurologic: CNII-XII intact, moves all extremities





HEART Score





- HEART Score


Troponin: 


                                        











Troponin T  0.933 ng/mL (0.00-0.029)  H*  08/13/22  01:28    














Results





- Labs


CBC & Chem 7: 


                                 08/17/22 05:05





                                 08/19/22 07:45


Labs: 


                             Laboratory Last Values











WBC  8.4 K/mm3 (4.5-11.0)   08/17/22  05:05    


 


RBC  2.82 M/mm3 (3.65-5.03)  L  08/17/22  05:05    


 


Hgb  8.3 gm/dl (11.8-15.2)  L  08/17/22  05:05    


 


Hct  25.0 % (35.5-45.6)  L  08/17/22  05:05    


 


MCV  88 fl (84-94)   08/17/22  05:05    


 


MCH  30 pg (28-32)   08/17/22  05:05    


 


MCHC  33 % (32-34)   08/17/22  05:05    


 


RDW  16.3 % (13.2-15.2)  H  08/17/22  05:05    


 


Plt Count  258 K/mm3 (140-440)   08/17/22  05:05    


 


Lymph % (Auto)  6.2 % (13.4-35.0)  L  08/17/22  05:05    


 


Mono % (Auto)  10.1 % (0.0-7.3)  H  08/17/22  05:05    


 


Eos % (Auto)  3.0 % (0.0-4.3)   08/17/22  05:05    


 


Baso % (Auto)  0.3 % (0.0-1.8)   08/17/22  05:05    


 


Lymph # (Auto)  0.5 K/mm3 (1.2-5.4)  L  08/17/22  05:05    


 


Mono # (Auto)  0.8 K/mm3 (0.0-0.8)   08/17/22  05:05    


 


Eos # (Auto)  0.3 K/mm3 (0.0-0.4)   08/17/22  05:05    


 


Baso # (Auto)  0.0 K/mm3 (0.0-0.1)   08/17/22  05:05    


 


Seg Neutrophils %  80.4 % (40.0-70.0)  H  08/17/22  05:05    


 


Seg Neutrophils #  6.8 K/mm3 (1.8-7.7)   08/17/22  05:05    


 


PT  17.0 Sec. (12.2-14.9)  H  08/13/22  01:28    


 


INR  1.23  (0.87-1.13)  H  08/13/22  01:28    


 


Sodium  139 mmol/L (137-145)   08/19/22  07:45    


 


Potassium  4.2 mmol/L (3.6-5.0)   08/19/22  07:45    


 


Chloride  91.0 mmol/L ()  L  08/19/22  07:45    


 


Carbon Dioxide  28 mmol/L (22-30)   08/19/22  07:45    


 


Anion Gap  24 mmol/L  08/19/22  07:45    


 


BUN  71 mg/dL (9-20)  H  08/19/22  07:45    


 


Creatinine  9.9 mg/dL (0.8-1.3)  H  08/19/22  07:45    


 


Estimated GFR  7 ml/min  08/19/22  07:45    


 


BUN/Creatinine Ratio  7 %  08/19/22  07:45    


 


Glucose  87 mg/dL ()   08/19/22  07:45    


 


POC Glucose  141 mg/dL ()  H  08/18/22  21:46    


 


Calcium  8.3 mg/dL (8.4-10.2)  L  08/19/22  07:45    


 


Phosphorus  8.30 mg/dL (2.5-4.5)  H  08/19/22  07:45    


 


Magnesium  2.20 mg/dL (1.7-2.3)   08/14/22  07:22    


 


Total Bilirubin  0.30 mg/dL (0.1-1.2)   08/15/22  04:23    


 


AST  16 units/L (5-40)   08/15/22  04:23    


 


ALT  16 units/L (7-56)   08/15/22  04:23    


 


Alkaline Phosphatase  60 units/L ()   08/15/22  04:23    


 


Total Creatine Kinase  1147 units/L ()  H  08/13/22  01:28    


 


Troponin T  0.933 ng/mL (0.00-0.029)  H*  08/13/22  01:28    


 


Total Protein  6.3 g/dL (6.3-8.2)   08/15/22  04:23    


 


Albumin  2.4 g/dL (3.9-5)  L  08/15/22  04:23    


 


Albumin/Globulin Ratio  0.6 %  08/15/22  04:23    


 


Triglycerides  178 mg/dL (2-149)  H  08/13/22  01:28    


 


Cholesterol  112 mg/dL ()   08/13/22  01:28    


 


LDL Cholesterol Direct  30 mg/dL ()  L  08/13/22  01:28    


 


HDL Cholesterol  39 mg/dL (40-59)  L  08/13/22  01:28    


 


Cholesterol/HDL Ratio  2.87 %  08/13/22  01:28    


 


Salicylates  < 0.3 mg/dL (2.8-20.0)  L  08/13/22  01:28    


 


Acetaminophen  5.0 ug/mL (10.0-30.0)  L  08/13/22  01:28    


 


Plasma/Serum Alcohol  < 0.01 % (0-0.07)   08/13/22  01:28    


 


Coronavirus (PCR)  Negative  (Negative)   08/14/22  Unknown


 


SARS-CoV-2 (PCR)  Negative  (Negative)   08/15/22  11:05    


 


Hepatitis A IgM Ab  Non-reactive  (NonReactive)   08/13/22  01:28    


 


Hep Bs Antigen  Non-reactive  (Negative)   08/13/22  01:28    


 


Hep B Core IgM Ab  Non-reactive  (NonReactive)   08/13/22  01:28    


 


Hepatitis C Antibody  Non-reactive  (NonReactive)   08/13/22  01:28    


 


Blood Type  A POSITIVE   08/14/22  10:00    


 


Antibody Screen  Negative   08/14/22  10:00    


 


Crossmatch  See Detail   08/14/22  10:00    











Petty/IV: 


                                        





Voiding Method                   Condom Catheter











Active Medications





- Current Medications


Current Medications: 














Generic Name Dose Route Start Last Admin





  Trade Name Freq  PRN Reason Stop Dose Admin


 


Acetaminophen  650 mg  08/13/22 19:48 





  Acetaminophen 325 Mg Tab  PO  





  Q4H PRN  





  Pain MILD(1-3)/Fever >100.5/HA  


 


Aspirin  81 mg  08/14/22 10:00  08/18/22 10:20





  Aspirin Ec 81 Mg Tab  PO   81 mg





  QDAY LASHA   Administration


 


Epoetin Jesus-epbx  20,000 unit  08/16/22 21:57 





  Epoetin Jesus-Epbx 10,000 Unit/1 Ml Vial  SUB-Q  





  EUSEBIA PRN  





  hemodialysis  


 


Guaifenesin  200 mg  08/18/22 01:57  08/18/22 17:26





  Guaifenesin 100 Mg/5 Ml Oral Liqd  PO   200 mg





  Q4H PRN   Administration





  Cough  


 


Heparin Sodium (Porcine)  5,000 unit  08/13/22 22:00  08/18/22 21:39





  Heparin 5,000 Unit/1 Ml Vial  SUB-Q   5,000 unit





  Q12HR LASHA   Administration


 


Hydromorphone HCl  0.5 mg  08/13/22 19:48 





  Hydromorphone 0.5 Mg/0.5 Ml Inj  IV  





  Q3H PRN  





  Pain , Severe (7-10)  


 


Sodium Chloride  100 mls @ 999 mls/hr  08/19/22 08:23 





  Nacl 0.9%  IV  





  EUSEBIA PRN  





  Hypotension  


 


Insulin Human Lispro  0 unit  08/14/22 07:30  08/19/22 05:37





  Insulin Lispro 100 Unit/Ml  SUB-Q   Not Given





  [facility]  





  Protocol  


 


Levetiracetam  500 mg  08/14/22 10:00  08/18/22 21:39





  Levetiracetam 500 Mg Tab  PO   500 mg





  BID LASHA   Administration


 


Metoclopramide HCl  5 mg  08/16/22 09:00 





  Metoclopramide 10 Mg/2 Ml Inj  IV  





  Q8H PRN  





  Nausea And Vomiting  


 


Metoprolol Tartrate  100 mg  08/14/22 10:00  08/18/22 21:40





  Metoprolol Tartrate 100 Mg Tab  PO   100 mg





  BID LASHA   Administration


 


Morphine Sulfate  2 mg  08/13/22 19:48 





  Morphine 2 Mg/1 Ml Inj  IV  





  Q4H PRN  





  Pain, Moderate (4-6)  


 


Ondansetron HCl  4 mg  08/13/22 19:48  08/18/22 21:39





  Ondansetron 4 Mg/2 Ml Inj  IV   4 mg





  Q8H PRN   Administration





  Nausea And Vomiting  


 


Oxycodone/Acetaminophen  1 tab  08/13/22 19:48 





  Oxycodone /Acetaminophen 5-325mg Tab  PO  





  Q6H PRN  





  Pain, Moderate (4-6)  


 


Sevelamer Carbonate  2,400 mg  08/14/22 12:30  08/18/22 16:30





  Sevelamer Carbonate 800 Mg Tab  PO   2,400 mg





  AC LASHA   Administration


 


Sodium Chloride  10 ml  08/13/22 22:00  08/18/22 21:40





  Sodium Chloride 0.9% 10 Ml Flush Syringe  IV   10 ml





  BID LASHA   Administration


 


Sodium Chloride  10 ml  08/13/22 19:48 





  Sodium Chloride 0.9% 10 Ml Flush Syringe  IV  





  PRN PRN  





  LINE FLUSH  


 


Zolpidem Tartrate  5 mg  08/18/22 01:56  08/18/22 02:18





  Zolpidem 5 Mg Tab  PO   5 mg





  QHS PRN   Administration





  Sleep  














Nutrition/Malnutrition Assess





- Dietary Evaluation


Nutrition/Malnutrition Findings: 


                                        





Nutrition Notes                                            Start:  08/16/22 

15:24


Freq:                                                      Status: Active       




Protocol:                                                                       




 Document     08/16/22 15:24  GLORIA  (Rec: 08/16/22 15:47  GLORIA  [id number])


 Nutrition Notes


     Need for Assessment generated from:         Low BMI


     Initial or Follow up                        Assessment


     Current Diagnosis                           CKD (stage V CKD),Diabetes,


                                                 Hypertension,Malnutrition


     Other Pertinent Diagnosis                   ESRD+HD, Seizure Disorder,


                                                 Anemia, s/p Metabolic


                                                 Encephalopathy.


     Current Diet                                Renal Diet (since D 08/13), D


                                                 Suppl (from D 08/16).


     Labs/Tests                                  08/16: Na 135, Cl 91.3, BUN 88


                                                 , Crea 12.3, Glu 113.


     Pertinent Medications                       08/16: Renvela, others


                                                 nutritionally unremarkable.


     Height                                      5 ft 9 in


     Weight                                      54.43 kg


     Ideal Body Weight (kg)                      72.72


     BMI                                         17.7


     Weight change and time frame                None provided at admission.


     Weight Status                               Underweight


     Subjective/Other Information                RD consult for Low BMI


                                                 assessment.


                                                 Pt's PO intake of meals has


                                                 been Fair (>50%) and fairly


                                                 tolerated, according to ADL


                                                 notes.


                                                 I will keep the dietary


                                                 supplemnets prescription to


                                                 compensate for poor or


                                                 insufficient PO intake of


                                                 meals during LOS.


                                                 Pt is on Room Air, O2


                                                 saturation @ 100%, according


                                                 to Physical Assessment History


                                                 notes.


                                                 Pt has missing teeth,


                                                 according to Physical


                                                 Assessment History notes.


                                                 Pt remains with diarrhea and


                                                 incontinent, according to


                                                 Physical Assessment History


                                                 notes.


                                                 Pt presents unspecified


                                                 dryness and flaking as signs


                                                 of concern for skin risk at


                                                 the time, according to


                                                 Physical Assessment History


                                                 notes.


                                                 Pt's Low BMI seems to


                                                 correspond to a natural body


                                                 composition, and not related


                                                 to a sudden loss of body


                                                 weight nor chronic


                                                 malnutrition, since no signs


                                                 of concern were mentioned in


                                                 the Physical Assessment


                                                 History or the Progress notes.


     Percent of energy/protein needs met:        Prescribed Renal Diet provides


                                                 for energy/protein needs (2,


                                                 072 Kcal/77 g) during LOS;


                                                 additionally, Dietary


                                                 Supplements will compensate


                                                 for possible poor or


                                                 insufficient PO intake of


                                                 meals with 1,275 Kcal and 57 g


                                                 of protein.


     Burn                                        Absent


     Trauma                                      Absent


     GI Symptoms                                 Diarrhea,Other


     Food Allergy                                No


     Skin Integrity/Comment                      Unspecified dryness and


                                                 flaking.


     Current % PO                                Fair (50-74%)


     Minimum of two criteria                     No


     Fluid Accumulation                          N/A


     Reduced  Strength                       N/A (non-severe)


     Protein-Calorie Malnutrition                N\A


     #1


      Nutrition Diagnosis                        Inadequate protein-energy


                                                 intake


      Etiology                                   Possibly associated with


                                                 Metabolic Encephalopathy.


      As Evidenced by Signs and Symptoms         Pt's PO intake of meals has


                                                 been Fair (>50%) and fairly


                                                 tolerated, according to ADL


                                                 notes.


     Is patient on ventilator?                   No


     Is Patient Ambulatory and/or Out of Bed     No


     REE-([place]-St. Jeor-confined to bed)      [phone]


     Kcal/Kg value to use for calculation        36


     Approximate Energy Requirements Using       1959


      kcal/Kg                                    


     Calculation Used for Recommendations        Kcal/kg


     Additional Notes                            Protein: >1.2 g/Kg ABW; >65 g/


                                                 day.


                                                 Fluids: 1 ml/Kcal, or as per


                                                 MD.


 Nutrition Intervention


     Change Diet Order:                          Continue Renal Diet as


                                                 tolerated.


     Add Supplement/Snack (indicate name/kcal    Start Nepro w/CARBSTEADY; TID.


      /protein )                                 


     Provides kCal:                              1,275


     Provides Protein (gm)                       57


     Goal #1                                     Compensate, through dietary


                                                 supplementation, for possible


                                                 poor or insufficient PO intake


                                                 of meals during LOS.


     Goal #2                                     Adjust the dietary


                                                 intervention to better serve


                                                 Pt's needs and clinical


                                                 conditions during LOS.


     Follow-Up By:                               08/23/22


     Additional Comments                         Continue monitoring food


                                                 tolerance, %PO intake of meals


                                                 , dietary supplements, and BM.

## 2022-08-20 RX ADMIN — Medication SCH ML: at 22:17

## 2022-08-20 RX ADMIN — INSULIN LISPRO SCH: 100 INJECTION, SOLUTION INTRAVENOUS; SUBCUTANEOUS at 22:19

## 2022-08-20 RX ADMIN — INSULIN LISPRO SCH: 100 INJECTION, SOLUTION INTRAVENOUS; SUBCUTANEOUS at 08:00

## 2022-08-20 RX ADMIN — HEPARIN SODIUM SCH UNIT: 5000 INJECTION, SOLUTION INTRAVENOUS; SUBCUTANEOUS at 22:17

## 2022-08-20 RX ADMIN — Medication SCH ML: at 09:59

## 2022-08-20 RX ADMIN — SEVELAMER CARBONATE SCH MG: 800 TABLET, FILM COATED ORAL at 07:59

## 2022-08-20 RX ADMIN — HEPARIN SODIUM SCH UNIT: 5000 INJECTION, SOLUTION INTRAVENOUS; SUBCUTANEOUS at 09:58

## 2022-08-20 RX ADMIN — INSULIN LISPRO SCH UNIT: 100 INJECTION, SOLUTION INTRAVENOUS; SUBCUTANEOUS at 15:58

## 2022-08-20 RX ADMIN — METOPROLOL TARTRATE SCH MG: 100 TABLET, FILM COATED ORAL at 09:59

## 2022-08-20 RX ADMIN — SEVELAMER CARBONATE SCH MG: 800 TABLET, FILM COATED ORAL at 15:41

## 2022-08-20 RX ADMIN — INSULIN LISPRO SCH UNIT: 100 INJECTION, SOLUTION INTRAVENOUS; SUBCUTANEOUS at 15:08

## 2022-08-20 RX ADMIN — ASPIRIN SCH MG: 81 TABLET, COATED ORAL at 09:59

## 2022-08-20 RX ADMIN — METOPROLOL TARTRATE SCH MG: 100 TABLET, FILM COATED ORAL at 22:17

## 2022-08-20 RX ADMIN — SEVELAMER CARBONATE SCH MG: 800 TABLET, FILM COATED ORAL at 14:34

## 2022-08-20 NOTE — PROGRESS NOTE
Assessment and Plan


Assessment and plan: 





51-year-old  seizure disorder hypertension type 2 diabetes and end-stage 

renal disease on hemodialysis presents with lethargy and confusion.  Family 

brought him to the emergency room stating that he is confused not oriented.  His

last known dialysis is not known.  Improvement on fluids.  No fever or chills.


No seizures.  Noncompliant with his medications.











Assessment and Plan:


(1) Acute metabolic encephalopathy (resolved)


Current Visit: Yes   Status: Acute   


Plan to address problem: 


Secondary to severe uremia.  His creatinine is 32.1 and BUN is 242.  Family does

not know how many days he has missed his hemodialysis.  


Patient to be counseled about compliance once he  is more alert and oriented.








(2) Hyperkalemia (improved)


Current Visit: Yes   Status: Acute   


Plan to address problem: 


Treated in the emergency room and patient going for hemodialysis


Correct with HD








(3) Metabolic acidosis (resolved)


Current Visit: Yes   Status: Acute   


Plan to address problem: 


Secondary to severe uremia.








(4) Seizure disorder


Current Visit: Yes   Status: Chronic   


Plan to address problem: 


Continue Keppra.








(5) T2DM (type 2 diabetes mellitus)


Current Visit: Yes   Status: Chronic   


Qualifiers: 


   Diabetes mellitus long term insulin use: unspecified long term insulin use 

status   Chronic kidney disease stage: on chronic dialysis 


Plan to address problem: 


Coverage for now








(6) ESRD needing dialysis


Current Visit: Yes   Status: Acute   


Plan to address problem: 


Continue hemodialysis


Nephrology following


Patient to be counseled about compliance








(7) Anemia


Current Visit: No   Status: Chronic   


Qualifiers: 


   Anemia type: due to chronic kidney disease 


Plan to address problem: 


Secondary to chronic kidney disease


Epogen as per nephrology


1 unit prbc given on 8/14.


Hgb and VSS currently stable.








(8) Hypertension


Current Visit: Yes   Status: Chronic   


Qualifiers: 


   Hypertension type: primary hypertension   Qualified Code(s): I10 - Essential 

(primary) hypertension   


Plan to address problem: 


Continue antihypertensives








(9) Malnutrition


Current Visit: Yes   Status: Chronic   


Qualifiers: 


   Malnutrition type: protein-calorie malnutrition   Protein-calorie 

malnutrition severity: severe   Qualified Code(s): E43 - Unspecified severe 

protein-calorie malnutrition   


Plan to address problem: 


Dietary supplements initiated


Dietitian consult requested





hospital course:


8/14: AOx4 on bedside encounter. Transfused 1 unit due to hgb 6.8. Patient st

ated he has now missed several dialysis sessions. He [facility] to [facility] per patient. will place CM consultation to ensure patient still has 

chair. Anticipate d/c tomorrow.





8/15: Hgb improved to 7.7. Will work with CM regarding dialysis chair as patient

prior chair due to noncompliance PTA.





8/16: Await case management decision regarding outpatient hemodialysis





8/17: Physical therapy recommends SNF placement.  We will discuss with case 

management disposition.  Continue hemodialysis per nephrology recommendations.





8/18: Awaiting for arrangements for hemodialysis associated with skilled nursing

facility or outpatient hemodialysis chair.  I discussed the case with case 

management





8/19:   Awaiting for arrangements for hemodialysis associated with skilled 

nursing facility or outpatient hemodialysis chair.  Patient is less confused and

appears to be back to baseline.





8/20:  Awaiting for arrangements for hemodialysis associated with skilled 

nursing facility or outpatient hemodialysis chair.  Patient appears to be 

confused this morning.  Continue restraints for safety





History


Interval history: 





No new issues overnight





Hospitalist Physical





- Constitutional


Vitals: 


                                        











Temp Pulse Resp BP Pulse Ox


 


 97.8 F   75   17   129/71   96 


 


 08/20/22 05:19  08/20/22 05:19  08/20/22 05:19  08/20/22 05:19  08/20/22 05:19











General appearance: Present: no acute distress, well-nourished





- EENT


Eyes: Present: PERRL, EOM intact


ENT: hearing intact, clear oral mucosa, dentition normal





- Neck


Neck: Present: supple, normal ROM





- Respiratory


Respiratory effort: normal


Respiratory: bilateral: CTA





- Cardiovascular


Rhythm: regular


Heart Sounds: Present: S1 & S2.  Absent: gallop, rub





- Extremities


Extremities: no ischemia, No edema, Full ROM





- Abdominal


General gastrointestinal: soft, non-tender, non-distended, normal bowel sounds





- Integumentary


Integumentary: Present: clear, warm, dry





- Neurologic


Neurologic: CNII-XII intact, moves all extremities





HEART Score





- HEART Score


Troponin: 


                                        











Troponin T  0.933 ng/mL (0.00-0.029)  H*  08/13/22  01:28    














Results





- Labs


CBC & Chem 7: 


                                 08/17/22 05:05





                                 08/19/22 07:45


Labs: 


                             Laboratory Last Values











WBC  8.4 K/mm3 (4.5-11.0)   08/17/22  05:05    


 


RBC  2.82 M/mm3 (3.65-5.03)  L  08/17/22  05:05    


 


Hgb  8.3 gm/dl (11.8-15.2)  L  08/17/22  05:05    


 


Hct  25.0 % (35.5-45.6)  L  08/17/22  05:05    


 


MCV  88 fl (84-94)   08/17/22  05:05    


 


MCH  30 pg (28-32)   08/17/22  05:05    


 


MCHC  33 % (32-34)   08/17/22  05:05    


 


RDW  16.3 % (13.2-15.2)  H  08/17/22  05:05    


 


Plt Count  258 K/mm3 (140-440)   08/17/22  05:05    


 


Lymph % (Auto)  6.2 % (13.4-35.0)  L  08/17/22  05:05    


 


Mono % (Auto)  10.1 % (0.0-7.3)  H  08/17/22  05:05    


 


Eos % (Auto)  3.0 % (0.0-4.3)   08/17/22  05:05    


 


Baso % (Auto)  0.3 % (0.0-1.8)   08/17/22  05:05    


 


Lymph # (Auto)  0.5 K/mm3 (1.2-5.4)  L  08/17/22  05:05    


 


Mono # (Auto)  0.8 K/mm3 (0.0-0.8)   08/17/22  05:05    


 


Eos # (Auto)  0.3 K/mm3 (0.0-0.4)   08/17/22  05:05    


 


Baso # (Auto)  0.0 K/mm3 (0.0-0.1)   08/17/22  05:05    


 


Seg Neutrophils %  80.4 % (40.0-70.0)  H  08/17/22  05:05    


 


Seg Neutrophils #  6.8 K/mm3 (1.8-7.7)   08/17/22  05:05    


 


PT  17.0 Sec. (12.2-14.9)  H  08/13/22  01:28    


 


INR  1.23  (0.87-1.13)  H  08/13/22  01:28    


 


Sodium  139 mmol/L (137-145)   08/19/22  07:45    


 


Potassium  4.2 mmol/L (3.6-5.0)   08/19/22  07:45    


 


Chloride  91.0 mmol/L ()  L  08/19/22  07:45    


 


Carbon Dioxide  28 mmol/L (22-30)   08/19/22  07:45    


 


Anion Gap  24 mmol/L  08/19/22  07:45    


 


BUN  71 mg/dL (9-20)  H  08/19/22  07:45    


 


Creatinine  9.9 mg/dL (0.8-1.3)  H  08/19/22  07:45    


 


Estimated GFR  7 ml/min  08/19/22  07:45    


 


BUN/Creatinine Ratio  7 %  08/19/22  07:45    


 


Glucose  87 mg/dL ()   08/19/22  07:45    


 


POC Glucose  82 mg/dL ()   08/20/22  07:39    


 


Calcium  8.3 mg/dL (8.4-10.2)  L  08/19/22  07:45    


 


Phosphorus  8.30 mg/dL (2.5-4.5)  H  08/19/22  07:45    


 


Magnesium  2.20 mg/dL (1.7-2.3)   08/14/22  07:22    


 


Total Bilirubin  0.30 mg/dL (0.1-1.2)   08/15/22  04:23    


 


AST  16 units/L (5-40)   08/15/22  04:23    


 


ALT  16 units/L (7-56)   08/15/22  04:23    


 


Alkaline Phosphatase  60 units/L ()   08/15/22  04:23    


 


Total Creatine Kinase  1147 units/L ()  H  08/13/22  01:28    


 


Troponin T  0.933 ng/mL (0.00-0.029)  H*  08/13/22  01:28    


 


Total Protein  6.3 g/dL (6.3-8.2)   08/15/22  04:23    


 


Albumin  2.4 g/dL (3.9-5)  L  08/15/22  04:23    


 


Albumin/Globulin Ratio  0.6 %  08/15/22  04:23    


 


Triglycerides  178 mg/dL (2-149)  H  08/13/22  01:28    


 


Cholesterol  112 mg/dL ()   08/13/22  01:28    


 


LDL Cholesterol Direct  30 mg/dL ()  L  08/13/22  01:28    


 


HDL Cholesterol  39 mg/dL (40-59)  L  08/13/22  01:28    


 


Cholesterol/HDL Ratio  2.87 %  08/13/22  01:28    


 


Salicylates  < 0.3 mg/dL (2.8-20.0)  L  08/13/22  01:28    


 


Acetaminophen  5.0 ug/mL (10.0-30.0)  L  08/13/22  01:28    


 


Plasma/Serum Alcohol  < 0.01 % (0-0.07)   08/13/22  01:28    


 


Coronavirus (PCR)  Negative  (Negative)   08/14/22  Unknown


 


SARS-CoV-2 (PCR)  Negative  (Negative)   08/15/22  11:05    


 


Hepatitis A IgM Ab  Non-reactive  (NonReactive)   08/13/22  01:28    


 


Hep Bs Antigen  Non-reactive  (Negative)   08/13/22  01:28    


 


Hep B Core IgM Ab  Non-reactive  (NonReactive)   08/13/22  01:28    


 


Hepatitis C Antibody  Non-reactive  (NonReactive)   08/13/22  01:28    


 


Blood Type  A POSITIVE   08/14/22  10:00    


 


Antibody Screen  Negative   08/14/22  10:00    


 


Crossmatch  See Detail   08/14/22  10:00    











Petty/IV: 


                                        





Voiding Method                   Condom Catheter











Active Medications





- Current Medications


Current Medications: 














Generic Name Dose Route Start Last Admin





  Trade Name Freq  PRN Reason Stop Dose Admin


 


Acetaminophen  650 mg  08/13/22 19:48 





  Acetaminophen 325 Mg Tab  PO  





  Q4H PRN  





  Pain MILD(1-3)/Fever >100.5/HA  


 


Aspirin  81 mg  08/14/22 10:00  08/19/22 10:00





  Aspirin Ec 81 Mg Tab  PO   Not Given





  QDAY LASHA  


 


Epoetin Jesus-epbx  20,000 unit  08/16/22 21:57 





  Epoetin Jesus-Epbx 10,000 Unit/1 Ml Vial  SUB-Q  





  EUSEBIA PRN  





  hemodialysis  


 


Guaifenesin  200 mg  08/18/22 01:57  08/18/22 17:26





  Guaifenesin 100 Mg/5 Ml Oral Liqd  PO   200 mg





  Q4H PRN   Administration





  Cough  


 


Heparin Sodium (Porcine)  5,000 unit  08/13/22 22:00  08/19/22 21:56





  Heparin 5,000 Unit/1 Ml Vial  SUB-Q   5,000 unit





  Q12HR LASHA   Administration


 


Hydromorphone HCl  0.5 mg  08/13/22 19:48 





  Hydromorphone 0.5 Mg/0.5 Ml Inj  IV  





  Q3H PRN  





  Pain , Severe (7-10)  


 


Sodium Chloride  100 mls @ 999 mls/hr  08/19/22 08:23 





  Nacl 0.9%  IV  





  EUSEBIA PRN  





  Hypotension  


 


Insulin Human Lispro  0 unit  08/14/22 07:30  08/20/22 08:00





  Insulin Lispro 100 Unit/Ml  SUB-Q   Not Given





  [facility]  





  Protocol  


 


Levetiracetam  500 mg  08/14/22 10:00  08/19/22 21:56





  Levetiracetam 500 Mg Tab  PO   500 mg





  BID LASHA   Administration


 


Metoclopramide HCl  5 mg  08/16/22 09:00 





  Metoclopramide 10 Mg/2 Ml Inj  IV  





  Q8H PRN  





  Nausea And Vomiting  


 


Metoprolol Tartrate  100 mg  08/14/22 10:00  08/19/22 21:56





  Metoprolol Tartrate 100 Mg Tab  PO   100 mg





  BID LASHA   Administration


 


Morphine Sulfate  2 mg  08/13/22 19:48 





  Morphine 2 Mg/1 Ml Inj  IV  





  Q4H PRN  





  Pain, Moderate (4-6)  


 


Ondansetron HCl  4 mg  08/13/22 19:48  08/19/22 21:56





  Ondansetron 4 Mg/2 Ml Inj  IV   4 mg





  Q8H PRN   Administration





  Nausea And Vomiting  


 


Oxycodone/Acetaminophen  1 tab  08/13/22 19:48 





  Oxycodone /Acetaminophen 5-325mg Tab  PO  





  Q6H PRN  





  Pain, Moderate (4-6)  


 


Sevelamer Carbonate  2,400 mg  08/14/22 12:30  08/20/22 07:59





  Sevelamer Carbonate 800 Mg Tab  PO   2,400 mg





  AC LASHA   Administration


 


Sodium Chloride  10 ml  08/13/22 22:00  08/19/22 22:00





  Sodium Chloride 0.9% 10 Ml Flush Syringe  IV   10 ml





  BID LASHA   Administration


 


Sodium Chloride  10 ml  08/13/22 19:48 





  Sodium Chloride 0.9% 10 Ml Flush Syringe  IV  





  PRN PRN  





  LINE FLUSH  


 


Zolpidem Tartrate  5 mg  08/18/22 01:56  08/18/22 02:18





  Zolpidem 5 Mg Tab  PO   5 mg





  QHS PRN   Administration





  Sleep  














Nutrition/Malnutrition Assess





- Dietary Evaluation


Nutrition/Malnutrition Findings: 


                                        





Nutrition Notes                                            Start:  08/16/22 

15:24


Freq:                                                      Status: Active       




Protocol:                                                                       




 Document     08/16/22 15:24  GLORIA  (Rec: 08/16/22 15:47  GLORIA  [id number])


 Nutrition Notes


     Need for Assessment generated from:         Low BMI


     Initial or Follow up                        Assessment


     Current Diagnosis                           CKD (stage V CKD),Diabetes,


                                                 Hypertension,Malnutrition


     Other Pertinent Diagnosis                   ESRD+HD, Seizure Disorder,


                                                 Anemia, s/p Metabolic


                                                 Encephalopathy.


     Current Diet                                Renal Diet (since D 08/13), D


                                                 Suppl (from D 08/16).


     Labs/Tests                                  08/16: Na 135, Cl 91.3, BUN 88


                                                 , Crea 12.3, Glu 113.


     Pertinent Medications                       08/16: Renvela, others


                                                 nutritionally unremarkable.


     Height                                      5 ft 9 in


     Weight                                      54.43 kg


     Ideal Body Weight (kg)                      72.72


     BMI                                         17.7


     Weight change and time frame                None provided at admission.


     Weight Status                               Underweight


     Subjective/Other Information                RD consult for Low BMI


                                                 assessment.


                                                 Pt's PO intake of meals has


                                                 been Fair (>50%) and fairly


                                                 tolerated, according to ADL


                                                 notes.


                                                 I will keep the dietary


                                                 supplemnets prescription to


                                                 compensate for poor or


                                                 insufficient PO intake of


                                                 meals during LOS.


                                                 Pt is on Room Air, O2


                                                 saturation @ 100%, according


                                                 to Physical Assessment History


                                                 notes.


                                                 Pt has missing teeth,


                                                 according to Physical


                                                 Assessment History notes.


                                                 Pt remains with diarrhea and


                                                 incontinent, according to


                                                 Physical Assessment History


                                                 notes.


                                                 Pt presents unspecified


                                                 dryness and flaking as signs


                                                 of concern for skin risk at


                                                 the time, according to


                                                 Physical Assessment History


                                                 notes.


                                                 Pt's Low BMI seems to


                                                 correspond to a natural body


                                                 composition, and not related


                                                 to a sudden loss of body


                                                 weight nor chronic


                                                 malnutrition, since no signs


                                                 of concern were mentioned in


                                                 the Physical Assessment


                                                 History or the Progress notes.


     Percent of energy/protein needs met:        Prescribed Renal Diet provides


                                                 for energy/protein needs (2,


                                                 072 Kcal/77 g) during LOS;


                                                 additionally, Dietary


                                                 Supplements will compensate


                                                 for possible poor or


                                                 insufficient PO intake of


                                                 meals with 1,275 Kcal and 57 g


                                                 of protein.


     Burn                                        Absent


     Trauma                                      Absent


     GI Symptoms                                 Diarrhea,Other


     Food Allergy                                No


     Skin Integrity/Comment                      Unspecified dryness and


                                                 flaking.


     Current % PO                                Fair (50-74%)


     Minimum of two criteria                     No


     Fluid Accumulation                          N/A


     Reduced  Strength                       N/A (non-severe)


     Protein-Calorie Malnutrition                N\A


     #1


      Nutrition Diagnosis                        Inadequate protein-energy


                                                 intake


      Etiology                                   Possibly associated with


                                                 Metabolic Encephalopathy.


      As Evidenced by Signs and Symptoms         Pt's PO intake of meals has


                                                 been Fair (>50%) and fairly


                                                 tolerated, according to ADL


                                                 notes.


     Is patient on ventilator?                   No


     Is Patient Ambulatory and/or Out of Bed     No


     REE-([place] [place]-confined to bed)      [phone]


     Kcal/Kg value to use for calculation        36


     Approximate Energy Requirements Using       1959


      kcal/Kg                                    


     Calculation Used for Recommendations        Kcal/kg


     Additional Notes                            Protein: >1.2 g/Kg ABW; >65 g/


                                                 day.


                                                 Fluids: 1 ml/Kcal, or as per


                                                 MD.


 Nutrition Intervention


     Change Diet Order:                          Continue Renal Diet as


                                                 tolerated.


     Add Supplement/Snack (indicate name/kcal    Start Nepro w/CARBSTEADY; TID.


      /protein )                                 


     Provides kCal:                              1,275


     Provides Protein (gm)                       57


     Goal #1                                     Compensate, through dietary


                                                 supplementation, for possible


                                                 poor or insufficient PO intake


                                                 of meals during LOS.


     Goal #2                                     Adjust the dietary


                                                 intervention to better serve


                                                 Pt's needs and clinical


                                                 conditions during LOS.


     Follow-Up By:                               08/23/22


     Additional Comments                         Continue monitoring food


                                                 tolerance, %PO intake of meals


                                                 , dietary supplements, and BM.

## 2022-08-20 NOTE — PROGRESS NOTE
Assessment and Plan


1. ESRD:


Patient is on maintenance HD.


Last outpatient HD unknown.


Meds dosage based on GFR.


Hemodialysis: 6/13, 6/15, 6/17, 6/19.





2. FEN:


Hyperkalemia, improved, on HD.


Hypernatremia, improved.


Pn Phos binders.


Monitor lytes and volume status.





3. Acute Metabolic Encephalopathy, POA:


Suspected Uremic encephalopathy.


Baseline cognitive decline.


Improving, monitor.





4. Seizure:


On Keppra.





5. Normocytic anemia, POA:


S/p PRBC.


Epogen with HD.


Monitor.





6. T2DM (type 2 diabetes mellitus):


SSI.











Subjective:


Patient was seen and examined at the bedside.











Examination:


General appearance: well-developed, emaciated, no distress


HEENT: ATNC, pupils equal


Neck: trachea midline


Respiratory: Clear to Auscultation


Cardiology: regular, S1S2, no murmur


Gastrointestinal: soft, normoactive bowel sounds, not tender, ND


Integumentary: diffuse discrete papular rash, fading


Neurologic: alert, oriented to self, able to move extremities


Ext: no edema noted


Hemodialysis access: L arm AVF/AVG





Subjective


Date of service: 08/20/22





Objective





- Vital Signs


Vital signs: 


                               Vital Signs - 12hr











  08/20/22 08/20/22 08/20/22





  05:19 10:00 10:08


 


Temperature 97.8 F  


 


Pulse Rate 75  


 


Respiratory 17 20 





Rate   


 


Blood Pressure   121/60


 


Blood Pressure 129/71  





[Right]   


 


O2 Sat by Pulse 96 96 





Oximetry   














- Lab





                                 08/17/22 05:05





                                 08/19/22 07:45


                             Most recent lab results











Calcium  8.3 mg/dL (8.4-10.2)  L  08/19/22  07:45    


 


Phosphorus  8.30 mg/dL (2.5-4.5)  H  08/19/22  07:45    


 


Magnesium  2.20 mg/dL (1.7-2.3)   08/14/22  07:22    














Medications & Allergies





- Medications


Allergies/Adverse Reactions: 


                                    Allergies





No Known Allergies Allergy (Unverified 06/07/22 18:10)


   








Home Medications: 


                                Home Medications











 Medication  Instructions  Recorded  Confirmed  Last Taken  Type


 


Aspirin EC [Halfprin EC] 81 mg PO QDAY #30 tablet. 06/13/22 08/18/22 Unknown 

Rx


 


Metoprolol [Lopressor TAB] 100 mg PO BID #60 tablet 06/13/22 08/18/22 Unknown Rx


 


levETIRAcetam [Keppra TAB] 500 mg PO BID #60 tablet 06/13/22 08/18/22 Unknown Rx











Active Medications: 














Generic Name Dose Route Start Last Admin





  Trade Name Freq  PRN Reason Stop Dose Admin


 


Acetaminophen  650 mg  08/13/22 19:48 





  Acetaminophen 325 Mg Tab  PO  





  Q4H PRN  





  Pain MILD(1-3)/Fever >100.5/HA  


 


Aspirin  81 mg  08/14/22 10:00  08/20/22 09:59





  Aspirin Ec 81 Mg Tab  PO   81 mg





  QDAY LASHA   Administration


 


Epoetin Jesus-epbx  20,000 unit  08/16/22 21:57 





  Epoetin Jesus-Epbx 10,000 Unit/1 Ml Vial  SUB-Q  





  EUSEBIA PRN  





  hemodialysis  


 


Guaifenesin  200 mg  08/18/22 01:57  08/18/22 17:26





  Guaifenesin 100 Mg/5 Ml Oral Liqd  PO   200 mg





  Q4H PRN   Administration





  Cough  


 


Heparin Sodium (Porcine)  5,000 unit  08/13/22 22:00  08/20/22 09:58





  Heparin 5,000 Unit/1 Ml Vial  SUB-Q   5,000 unit





  Q12HR LASHA   Administration


 


Hydromorphone HCl  0.5 mg  08/13/22 19:48 





  Hydromorphone 0.5 Mg/0.5 Ml Inj  IV  





  Q3H PRN  





  Pain , Severe (7-10)  


 


Sodium Chloride  100 mls @ 999 mls/hr  08/19/22 08:23 





  Nacl 0.9%  IV  





  EUSEBIA PRN  





  Hypotension  


 


Insulin Human Lispro  0 unit  08/14/22 07:30  08/20/22 08:00





  Insulin Lispro 100 Unit/Ml  SUB-Q   Not Given





  [facility]  





  Protocol  


 


Levetiracetam  500 mg  08/14/22 10:00  08/20/22 09:59





  Levetiracetam 500 Mg Tab  PO   500 mg





  BID LASHA   Administration


 


Metoclopramide HCl  5 mg  08/16/22 09:00 





  Metoclopramide 10 Mg/2 Ml Inj  IV  





  Q8H PRN  





  Nausea And Vomiting  


 


Metoprolol Tartrate  100 mg  08/14/22 10:00  08/20/22 09:59





  Metoprolol Tartrate 100 Mg Tab  PO   100 mg





  BID LASHA   Administration


 


Morphine Sulfate  2 mg  08/13/22 19:48 





  Morphine 2 Mg/1 Ml Inj  IV  





  Q4H PRN  





  Pain, Moderate (4-6)  


 


Ondansetron HCl  4 mg  08/13/22 19:48  08/19/22 21:56





  Ondansetron 4 Mg/2 Ml Inj  IV   4 mg





  Q8H PRN   Administration





  Nausea And Vomiting  


 


Oxycodone/Acetaminophen  1 tab  08/13/22 19:48 





  Oxycodone /Acetaminophen 5-325mg Tab  PO  





  Q6H PRN  





  Pain, Moderate (4-6)  


 


Sevelamer Carbonate  2,400 mg  08/14/22 12:30  08/20/22 07:59





  Sevelamer Carbonate 800 Mg Tab  PO   2,400 mg





  AC LASHA   Administration


 


Sodium Chloride  10 ml  08/13/22 22:00  08/20/22 09:59





  Sodium Chloride 0.9% 10 Ml Flush Syringe  IV   10 ml





  BID LASHA   Administration


 


Sodium Chloride  10 ml  08/13/22 19:48 





  Sodium Chloride 0.9% 10 Ml Flush Syringe  IV  





  PRN PRN  





  LINE FLUSH  


 


Zolpidem Tartrate  5 mg  08/18/22 01:56  08/18/22 02:18





  Zolpidem 5 Mg Tab  PO   5 mg





  QHS PRN   Administration





  Sleep

## 2022-08-21 RX ADMIN — INSULIN LISPRO SCH: 100 INJECTION, SOLUTION INTRAVENOUS; SUBCUTANEOUS at 08:39

## 2022-08-21 RX ADMIN — METOPROLOL TARTRATE SCH MG: 100 TABLET, FILM COATED ORAL at 22:05

## 2022-08-21 RX ADMIN — ASPIRIN SCH MG: 81 TABLET, COATED ORAL at 09:10

## 2022-08-21 RX ADMIN — HEPARIN SODIUM SCH UNIT: 5000 INJECTION, SOLUTION INTRAVENOUS; SUBCUTANEOUS at 09:10

## 2022-08-21 RX ADMIN — INSULIN LISPRO SCH UNIT: 100 INJECTION, SOLUTION INTRAVENOUS; SUBCUTANEOUS at 23:00

## 2022-08-21 RX ADMIN — SEVELAMER CARBONATE SCH MG: 800 TABLET, FILM COATED ORAL at 08:38

## 2022-08-21 RX ADMIN — HEPARIN SODIUM SCH UNIT: 5000 INJECTION, SOLUTION INTRAVENOUS; SUBCUTANEOUS at 22:04

## 2022-08-21 RX ADMIN — Medication SCH ML: at 09:10

## 2022-08-21 RX ADMIN — SEVELAMER CARBONATE SCH MG: 800 TABLET, FILM COATED ORAL at 12:06

## 2022-08-21 RX ADMIN — Medication SCH ML: at 22:05

## 2022-08-21 RX ADMIN — INSULIN LISPRO SCH: 100 INJECTION, SOLUTION INTRAVENOUS; SUBCUTANEOUS at 19:12

## 2022-08-21 RX ADMIN — INSULIN LISPRO SCH: 100 INJECTION, SOLUTION INTRAVENOUS; SUBCUTANEOUS at 14:21

## 2022-08-21 RX ADMIN — GUAIFENESIN PRN MG: 100 SOLUTION ORAL at 09:10

## 2022-08-21 RX ADMIN — SEVELAMER CARBONATE SCH MG: 800 TABLET, FILM COATED ORAL at 17:07

## 2022-08-21 RX ADMIN — METOPROLOL TARTRATE SCH MG: 100 TABLET, FILM COATED ORAL at 09:10

## 2022-08-21 NOTE — PROGRESS NOTE
Assessment and Plan


Assessment and plan: 





51-year-old  seizure disorder hypertension type 2 diabetes and end-stage 

renal disease on hemodialysis presents with lethargy and confusion.  Family 

brought him to the emergency room stating that he is confused not oriented.  His

last known dialysis is not known.  Improvement on fluids.  No fever or chills.


No seizures.  Noncompliant with his medications.











Assessment and Plan:


(1) Acute metabolic encephalopathy (resolved)


Current Visit: Yes   Status: Acute   


Plan to address problem: 


Secondary to severe uremia.  His creatinine is 32.1 and BUN is 242.  Family does

not know how many days he has missed his hemodialysis.  


Patient to be counseled about compliance once he  is more alert and oriented.








(2) Hyperkalemia (improved)


Current Visit: Yes   Status: Acute   


Plan to address problem: 


Treated in the emergency room and patient going for hemodialysis


Correct with HD








(3) Metabolic acidosis (resolved)


Current Visit: Yes   Status: Acute   


Plan to address problem: 


Secondary to severe uremia.








(4) Seizure disorder


Current Visit: Yes   Status: Chronic   


Plan to address problem: 


Continue Keppra.








(5) T2DM (type 2 diabetes mellitus)


Current Visit: Yes   Status: Chronic   


Qualifiers: 


   Diabetes mellitus long term insulin use: unspecified long term insulin use 

status   Chronic kidney disease stage: on chronic dialysis 


Plan to address problem: 


Coverage for now








(6) ESRD needing dialysis


Current Visit: Yes   Status: Acute   


Plan to address problem: 


Continue hemodialysis


Nephrology following


Patient to be counseled about compliance








(7) Anemia


Current Visit: No   Status: Chronic   


Qualifiers: 


   Anemia type: due to chronic kidney disease 


Plan to address problem: 


Secondary to chronic kidney disease


Epogen as per nephrology


1 unit prbc given on 8/14.


Hgb and VSS currently stable.








(8) Hypertension


Current Visit: Yes   Status: Chronic   


Qualifiers: 


   Hypertension type: primary hypertension   Qualified Code(s): I10 - Essential 

(primary) hypertension   


Plan to address problem: 


Continue antihypertensives








(9) Malnutrition


Current Visit: Yes   Status: Chronic   


Qualifiers: 


   Malnutrition type: protein-calorie malnutrition   Protein-calorie 

malnutrition severity: severe   Qualified Code(s): E43 - Unspecified severe 

protein-calorie malnutrition   


Plan to address problem: 


Dietary supplements initiated


Dietitian consult requested





hospital course:


8/14: AOx4 on bedside encounter. Transfused 1 unit due to hgb 6.8. Patient st

ated he has now missed several dialysis sessions. He goes to [facility] patient. will place CM consultation to ensure patient still has 

chair. Anticipate d/c tomorrow.





8/15: Hgb improved to 7.7. Will work with CM regarding dialysis chair as patient

prior chair due to noncompliance PTA.





8/16: Await case management decision regarding outpatient hemodialysis





8/17: Physical therapy recommends SNF placement.  We will discuss with case 

management disposition.  Continue hemodialysis per nephrology recommendations.





8/18: Awaiting for arrangements for hemodialysis associated with skilled nursing

facility or outpatient hemodialysis chair.  I discussed the case with case 

management





8/19:   Awaiting for arrangements for hemodialysis associated with skilled 

nursing facility or outpatient hemodialysis chair.  Patient is less confused and

appears to be back to baseline.





8/20:  Awaiting for arrangements for hemodialysis associated with skilled 

nursing facility or outpatient hemodialysis chair.  Patient appears to be 

confused this morning.  Continue restraints for safety





8/21:  Awaiting for arrangements for hemodialysis associated with skilled 

nursing facility or outpatient hemodialysis chair.  Patient still with 

metabolic/uremic encephalopathy.  Patient has had baseline cognitive decline.  

Continue restraints for safety





History


Interval history: 





No new issues overnight





Hospitalist Physical





- Constitutional


Vitals: 


                                        











Temp Pulse Resp BP Pulse Ox


 


 98.4 F   78   17   118/67   96 


 


 08/21/22 06:37  08/21/22 06:37  08/21/22 06:37  08/21/22 06:37  08/21/22 06:37











General appearance: Present: no acute distress, well-nourished





- EENT


Eyes: Present: PERRL, EOM intact


ENT: hearing intact, clear oral mucosa, dentition normal





- Neck


Neck: Present: supple, normal ROM





- Respiratory


Respiratory effort: normal


Respiratory: bilateral: CTA





- Cardiovascular


Rhythm: regular


Heart Sounds: Present: S1 & S2.  Absent: gallop, rub





- Extremities


Extremities: no ischemia, No edema, Full ROM





- Abdominal


General gastrointestinal: soft, non-tender, non-distended, normal bowel sounds





- Integumentary


Integumentary: Present: clear, warm, dry





- Neurologic


Neurologic: CNII-XII intact, moves all extremities





HEART Score





- HEART Score


Troponin: 


                                        











Troponin T  0.933 ng/mL (0.00-0.029)  H*  08/13/22  01:28    














Results





- Labs


CBC & Chem 7: 


                                 08/17/22 05:05





                                 08/19/22 07:45


Labs: 


                             Laboratory Last Values











WBC  8.4 K/mm3 (4.5-11.0)   08/17/22  05:05    


 


RBC  2.82 M/mm3 (3.65-5.03)  L  08/17/22  05:05    


 


Hgb  8.3 gm/dl (11.8-15.2)  L  08/17/22  05:05    


 


Hct  25.0 % (35.5-45.6)  L  08/17/22  05:05    


 


MCV  88 fl (84-94)   08/17/22  05:05    


 


MCH  30 pg (28-32)   08/17/22  05:05    


 


MCHC  33 % (32-34)   08/17/22  05:05    


 


RDW  16.3 % (13.2-15.2)  H  08/17/22  05:05    


 


Plt Count  258 K/mm3 (140-440)   08/17/22  05:05    


 


Lymph % (Auto)  6.2 % (13.4-35.0)  L  08/17/22  05:05    


 


Mono % (Auto)  10.1 % (0.0-7.3)  H  08/17/22  05:05    


 


Eos % (Auto)  3.0 % (0.0-4.3)   08/17/22  05:05    


 


Baso % (Auto)  0.3 % (0.0-1.8)   08/17/22  05:05    


 


Lymph # (Auto)  0.5 K/mm3 (1.2-5.4)  L  08/17/22  05:05    


 


Mono # (Auto)  0.8 K/mm3 (0.0-0.8)   08/17/22  05:05    


 


Eos # (Auto)  0.3 K/mm3 (0.0-0.4)   08/17/22  05:05    


 


Baso # (Auto)  0.0 K/mm3 (0.0-0.1)   08/17/22  05:05    


 


Seg Neutrophils %  80.4 % (40.0-70.0)  H  08/17/22  05:05    


 


Seg Neutrophils #  6.8 K/mm3 (1.8-7.7)   08/17/22  05:05    


 


PT  17.0 Sec. (12.2-14.9)  H  08/13/22  01:28    


 


INR  1.23  (0.87-1.13)  H  08/13/22  01:28    


 


Sodium  139 mmol/L (137-145)   08/19/22  07:45    


 


Potassium  4.2 mmol/L (3.6-5.0)   08/19/22  07:45    


 


Chloride  91.0 mmol/L ()  L  08/19/22  07:45    


 


Carbon Dioxide  28 mmol/L (22-30)   08/19/22  07:45    


 


Anion Gap  24 mmol/L  08/19/22  07:45    


 


BUN  71 mg/dL (9-20)  H  08/19/22  07:45    


 


Creatinine  9.9 mg/dL (0.8-1.3)  H  08/19/22  07:45    


 


Estimated GFR  7 ml/min  08/19/22  07:45    


 


BUN/Creatinine Ratio  7 %  08/19/22  07:45    


 


Glucose  87 mg/dL ()   08/19/22  07:45    


 


POC Glucose  89 mg/dL ()   08/21/22  00:29    


 


Calcium  8.3 mg/dL (8.4-10.2)  L  08/19/22  07:45    


 


Phosphorus  8.30 mg/dL (2.5-4.5)  H  08/19/22  07:45    


 


Magnesium  2.20 mg/dL (1.7-2.3)   08/14/22  07:22    


 


Total Bilirubin  0.30 mg/dL (0.1-1.2)   08/15/22  04:23    


 


AST  16 units/L (5-40)   08/15/22  04:23    


 


ALT  16 units/L (7-56)   08/15/22  04:23    


 


Alkaline Phosphatase  60 units/L ()   08/15/22  04:23    


 


Total Creatine Kinase  1147 units/L ()  H  08/13/22  01:28    


 


Troponin T  0.933 ng/mL (0.00-0.029)  H*  08/13/22  01:28    


 


Total Protein  6.3 g/dL (6.3-8.2)   08/15/22  04:23    


 


Albumin  2.4 g/dL (3.9-5)  L  08/15/22  04:23    


 


Albumin/Globulin Ratio  0.6 %  08/15/22  04:23    


 


Triglycerides  178 mg/dL (2-149)  H  08/13/22  01:28    


 


Cholesterol  112 mg/dL ()   08/13/22  01:28    


 


LDL Cholesterol Direct  30 mg/dL ()  L  08/13/22  01:28    


 


HDL Cholesterol  39 mg/dL (40-59)  L  08/13/22  01:28    


 


Cholesterol/HDL Ratio  2.87 %  08/13/22  01:28    


 


Salicylates  < 0.3 mg/dL (2.8-20.0)  L  08/13/22  01:28    


 


Acetaminophen  5.0 ug/mL (10.0-30.0)  L  08/13/22  01:28    


 


Plasma/Serum Alcohol  < 0.01 % (0-0.07)   08/13/22  01:28    


 


Coronavirus (PCR)  Negative  (Negative)   08/14/22  Unknown


 


SARS-CoV-2 (PCR)  Negative  (Negative)   08/15/22  11:05    


 


Hepatitis A IgM Ab  Non-reactive  (NonReactive)   08/13/22  01:28    


 


Hep Bs Antigen  Non-reactive  (Negative)   08/13/22  01:28    


 


Hep B Core IgM Ab  Non-reactive  (NonReactive)   08/13/22  01:28    


 


Hepatitis C Antibody  Non-reactive  (NonReactive)   08/13/22  01:28    


 


Blood Type  A POSITIVE   08/14/22  10:00    


 


Antibody Screen  Negative   08/14/22  10:00    


 


Crossmatch  See Detail   08/14/22  10:00    











Petty/IV: 


                                        





Voiding Method                   Condom Catheter











Active Medications





- Current Medications


Current Medications: 














Generic Name Dose Route Start Last Admin





  Trade Name Freq  PRN Reason Stop Dose Admin


 


Acetaminophen  650 mg  08/13/22 19:48 





  Acetaminophen 325 Mg Tab  PO  





  Q4H PRN  





  Pain MILD(1-3)/Fever >100.5/HA  


 


Aspirin  81 mg  08/14/22 10:00  08/20/22 09:59





  Aspirin Ec 81 Mg Tab  PO   81 mg





  QDAY LASHA   Administration


 


Dextrose  25 ml  08/20/22 22:03  08/20/22 23:21





  Dextrose 50% In Water (25gm) 50 Ml Syringe  IV   25 ml





  Q30MIN PRN   Administration





  Hypoglycemia  





  Protocol  


 


Epoetin Jesus-epbx  20,000 unit  08/16/22 21:57 





  Epoetin Jesus-Epbx 10,000 Unit/1 Ml Vial  SUB-Q  





  EUSEBIA PRN  





  hemodialysis  


 


Guaifenesin  200 mg  08/18/22 01:57  08/18/22 17:26





  Guaifenesin 100 Mg/5 Ml Oral Liqd  PO   200 mg





  Q4H PRN   Administration





  Cough  


 


Heparin Sodium (Porcine)  5,000 unit  08/13/22 22:00  08/20/22 22:17





  Heparin 5,000 Unit/1 Ml Vial  SUB-Q   5,000 unit





  Q12HR LASHA   Administration


 


Hydromorphone HCl  0.5 mg  08/13/22 19:48 





  Hydromorphone 0.5 Mg/0.5 Ml Inj  IV  





  Q3H PRN  





  Pain , Severe (7-10)  


 


Sodium Chloride  100 mls @ 999 mls/hr  08/19/22 08:23 





  Nacl 0.9%  IV  





  EUSEBIA PRN  





  Hypotension  


 


Insulin Human Lispro  0 unit  08/14/22 07:30  08/20/22 22:19





  Insulin Lispro 100 Unit/Ml  SUB-Q   Not Given





  [facility]  





  Protocol  


 


Levetiracetam  500 mg  08/14/22 10:00  08/20/22 22:17





  Levetiracetam 500 Mg Tab  PO   500 mg





  BID LASHA   Administration


 


Metoclopramide HCl  5 mg  08/16/22 09:00 





  Metoclopramide 10 Mg/2 Ml Inj  IV  





  Q8H PRN  





  Nausea And Vomiting  


 


Metoprolol Tartrate  100 mg  08/14/22 10:00  08/20/22 22:17





  Metoprolol Tartrate 100 Mg Tab  PO   100 mg





  BID LASHA   Administration


 


Morphine Sulfate  2 mg  08/13/22 19:48 





  Morphine 2 Mg/1 Ml Inj  IV  





  Q4H PRN  





  Pain, Moderate (4-6)  


 


Ondansetron HCl  4 mg  08/13/22 19:48  08/19/22 21:56





  Ondansetron 4 Mg/2 Ml Inj  IV   4 mg





  Q8H PRN   Administration





  Nausea And Vomiting  


 


Oxycodone/Acetaminophen  1 tab  08/13/22 19:48 





  Oxycodone /Acetaminophen 5-325mg Tab  PO  





  Q6H PRN  





  Pain, Moderate (4-6)  


 


Sevelamer Carbonate  2,400 mg  08/14/22 12:30  08/20/22 15:41





  Sevelamer Carbonate 800 Mg Tab  PO   2,400 mg





  AC LASHA   Administration


 


Sodium Chloride  10 ml  08/13/22 22:00  08/20/22 22:17





  Sodium Chloride 0.9% 10 Ml Flush Syringe  IV   10 ml





  BID LASHA   Administration


 


Sodium Chloride  10 ml  08/13/22 19:48 





  Sodium Chloride 0.9% 10 Ml Flush Syringe  IV  





  PRN PRN  





  LINE FLUSH  


 


Zolpidem Tartrate  5 mg  08/18/22 01:56  08/18/22 02:18





  Zolpidem 5 Mg Tab  PO   5 mg





  QHS PRN   Administration





  Sleep  














Nutrition/Malnutrition Assess





- Dietary Evaluation


Nutrition/Malnutrition Findings: 


                                        





Nutrition Notes                                            Start:  08/16/22 

15:24


Freq:                                                      Status: Active       




Protocol:                                                                       




 Document     08/16/22 15:24  GLORIA  (Rec: 08/16/22 15:47  GLORIA  [id number])


 Nutrition Notes


     Need for Assessment generated from:         Low BMI


     Initial or Follow up                        Assessment


     Current Diagnosis                           CKD (stage V CKD),Diabetes,


                                                 Hypertension,Malnutrition


     Other Pertinent Diagnosis                   ESRD+HD, Seizure Disorder,


                                                 Anemia, s/p Metabolic


                                                 Encephalopathy.


     Current Diet                                Renal Diet (since D 08/13), D


                                                 Suppl (from D 08/16).


     Labs/Tests                                  08/16: Na 135, Cl 91.3, BUN 88


                                                 , Crea 12.3, Glu 113.


     Pertinent Medications                       08/16: Renvela, others


                                                 nutritionally unremarkable.


     Height                                      5 ft 9 in


     Weight                                      54.43 kg


     Ideal Body Weight (kg)                      72.72


     BMI                                         17.7


     Weight change and time frame                None provided at admission.


     Weight Status                               Underweight


     Subjective/Other Information                RD consult for Low BMI


                                                 assessment.


                                                 Pt's PO intake of meals has


                                                 been Fair (>50%) and fairly


                                                 tolerated, according to ADL


                                                 notes.


                                                 I will keep the dietary


                                                 supplemnets prescription to


                                                 compensate for poor or


                                                 insufficient PO intake of


                                                 meals during LOS.


                                                 Pt is on Room Air, O2


                                                 saturation @ 100%, according


                                                 to Physical Assessment History


                                                 notes.


                                                 Pt has missing teeth,


                                                 according to Physical


                                                 Assessment History notes.


                                                 Pt remains with diarrhea and


                                                 incontinent, according to


                                                 Physical Assessment History


                                                 notes.


                                                 Pt presents unspecified


                                                 dryness and flaking as signs


                                                 of concern for skin risk at


                                                 the time, according to


                                                 Physical Assessment History


                                                 notes.


                                                 Pt's Low BMI seems to


                                                 correspond to a natural body


                                                 composition, and not related


                                                 to a sudden loss of body


                                                 weight nor chronic


                                                 malnutrition, since no signs


                                                 of concern were mentioned in


                                                 the Physical Assessment


                                                 History or the Progress notes.


     Percent of energy/protein needs met:        Prescribed Renal Diet provides


                                                 for energy/protein needs (2,


                                                 072 Kcal/77 g) during LOS;


                                                 additionally, Dietary


                                                 Supplements will compensate


                                                 for possible poor or


                                                 insufficient PO intake of


                                                 meals with 1,275 Kcal and 57 g


                                                 of protein.


     Burn                                        Absent


     Trauma                                      Absent


     GI Symptoms                                 Diarrhea,Other


     Food Allergy                                No


     Skin Integrity/Comment                      Unspecified dryness and


                                                 flaking.


     Current % PO                                Fair (50-74%)


     Minimum of two criteria                     No


     Fluid Accumulation                          N/A


     Reduced  Strength                       N/A (non-severe)


     Protein-Calorie Malnutrition                N\A


     #1


      Nutrition Diagnosis                        Inadequate protein-energy


                                                 intake


      Etiology                                   Possibly associated with


                                                 Metabolic Encephalopathy.


      As Evidenced by Signs and Symptoms         Pt's PO intake of meals has


                                                 been Fair (>50%) and fairly


                                                 tolerated, according to ADL


                                                 notes.


     Is patient on ventilator?                   No


     Is Patient Ambulatory and/or Out of Bed     No


     REE-([place]-confined to bed)      [phone]


     Kcal/Kg value to use for calculation        36


     Approximate Energy Requirements Using       1959


      kcal/Kg                                    


     Calculation Used for Recommendations        Kcal/kg


     Additional Notes                            Protein: >1.2 g/Kg ABW; >65 g/


                                                 day.


                                                 Fluids: 1 ml/Kcal, or as per


                                                 MD.


 Nutrition Intervention


     Change Diet Order:                          Continue Renal Diet as


                                                 tolerated.


     Add Supplement/Snack (indicate name/kcal    Start Nepro w/CARBSTEADY; TID.


      /protein )                                 


     Provides kCal:                              1,275


     Provides Protein (gm)                       57


     Goal #1                                     Compensate, through dietary


                                                 supplementation, for possible


                                                 poor or insufficient PO intake


                                                 of meals during LOS.


     Goal #2                                     Adjust the dietary


                                                 intervention to better serve


                                                 Pt's needs and clinical


                                                 conditions during LOS.


     Follow-Up By:                               08/23/22


     Additional Comments                         Continue monitoring food


                                                 tolerance, %PO intake of meals


                                                 , dietary supplements, and BM.

## 2022-08-21 NOTE — PROGRESS NOTE
Assessment and Plan


1. ESRD:


Patient is on maintenance HD.


Last outpatient HD unknown.


Meds dosage based on GFR.


Hemodialysis: 6/13, 6/15, 6/17, 6/19.





2. FEN:


Hyperkalemia, improved, on HD.


Hypernatremia, improved.


On Phos binders.


Monitor lytes and volume status.





3. Acute Metabolic Encephalopathy, POA:


Suspected Uremic encephalopathy.


Baseline cognitive decline.


Improving, monitor.





4. Seizure:


On Keppra.





5. Normocytic anemia, POA:


S/p PRBC.


Epogen with HD.


Monitor.





6. T2DM (type 2 diabetes mellitus):


SSI.











Subjective:


Patient was seen and examined at the bedside.











Examination:


General appearance: well-developed, emaciated, no distress


HEENT: ATNC, pupils equal


Neck: trachea midline


Respiratory: Clear to Auscultation


Cardiology: regular, S1S2, no murmur


Gastrointestinal: soft, normoactive bowel sounds, not tender, ND


Integumentary: diffuse discrete dark papular rash


Neurologic: alert, oriented to self, able to move extremities


Ext: no edema noted


Hemodialysis access: L arm AVF/AVG





Subjective


Date of service: 08/21/22





Objective





- Vital Signs


Vital signs: 


                               Vital Signs - 12hr











  08/20/22 08/20/22 08/21/22





  22:11 22:17 06:37


 


Temperature 97.9 F  98.4 F


 


Pulse Rate 78 79 78


 


Respiratory 17  17





Rate   


 


Blood Pressure  129/72 


 


Blood Pressure 129/72  118/67





[Right]   


 


O2 Sat by Pulse   96





Oximetry   














  08/21/22





  09:27


 


Temperature 


 


Pulse Rate 


 


Respiratory 20





Rate 


 


Blood Pressure 


 


Blood Pressure 





[Right] 


 


O2 Sat by Pulse 96





Oximetry 














- Lab





                                 08/22/22 05:26





                                 08/22/22 05:26


                             Most recent lab results











Calcium  8.3 mg/dL (8.4-10.2)  L  08/19/22  07:45    


 


Phosphorus  8.30 mg/dL (2.5-4.5)  H  08/19/22  07:45    


 


Magnesium  2.20 mg/dL (1.7-2.3)   08/14/22  07:22    














Medications & Allergies





- Medications


Allergies/Adverse Reactions: 


                                    Allergies





No Known Allergies Allergy (Unverified 06/07/22 18:10)


   








Home Medications: 


                                Home Medications











 Medication  Instructions  Recorded  Confirmed  Last Taken  Type


 


Aspirin EC [Halfprin EC] 81 mg PO QDAY #30 tablet. 06/13/22 08/18/22 Unknown 

Rx


 


Metoprolol [Lopressor TAB] 100 mg PO BID #60 tablet 06/13/22 08/18/22 Unknown Rx


 


levETIRAcetam [Keppra TAB] 500 mg PO BID #60 tablet 06/13/22 08/18/22 Unknown Rx











Active Medications: 














Generic Name Dose Route Start Last Admin





  Trade Name Freq  PRN Reason Stop Dose Admin


 


Acetaminophen  650 mg  08/13/22 19:48 





  Acetaminophen 325 Mg Tab  PO  





  Q4H PRN  





  Pain MILD(1-3)/Fever >100.5/HA  


 


Aspirin  81 mg  08/14/22 10:00  08/21/22 09:10





  Aspirin Ec 81 Mg Tab  PO   81 mg





  QDAY LASHA   Administration


 


Dextrose  25 ml  08/20/22 22:03  08/20/22 23:21





  Dextrose 50% In Water (25gm) 50 Ml Syringe  IV   25 ml





  Q30MIN PRN   Administration





  Hypoglycemia  





  Protocol  


 


Epoetin Jesus-epbx  20,000 unit  08/16/22 21:57 





  Epoetin Jesus-Epbx 10,000 Unit/1 Ml Vial  SUB-Q  





  EUSEBIA PRN  





  hemodialysis  


 


Guaifenesin  200 mg  08/18/22 01:57  08/21/22 09:10





  Guaifenesin 100 Mg/5 Ml Oral Liqd  PO   200 mg





  Q4H PRN   Administration





  Cough  


 


Heparin Sodium (Porcine)  5,000 unit  08/13/22 22:00  08/21/22 09:10





  Heparin 5,000 Unit/1 Ml Vial  SUB-Q   5,000 unit





  Q12HR LASHA   Administration


 


Hydromorphone HCl  0.5 mg  08/13/22 19:48 





  Hydromorphone 0.5 Mg/0.5 Ml Inj  IV  





  Q3H PRN  





  Pain , Severe (7-10)  


 


Sodium Chloride  100 mls @ 999 mls/hr  08/19/22 08:23 





  Nacl 0.9%  IV  





  EUSEBIA PRN  





  Hypotension  


 


Insulin Human Lispro  0 unit  08/14/22 07:30  08/21/22 08:39





  Insulin Lispro 100 Unit/Ml  SUB-Q   Not Given





  ACHS LASHA  





  Protocol  


 


Levetiracetam  500 mg  08/14/22 10:00  08/21/22 09:10





  Levetiracetam 500 Mg Tab  PO   500 mg





  BID LASHA   Administration


 


Metoclopramide HCl  5 mg  08/16/22 09:00 





  Metoclopramide 10 Mg/2 Ml Inj  IV  





  Q8H PRN  





  Nausea And Vomiting  


 


Metoprolol Tartrate  100 mg  08/14/22 10:00  08/21/22 09:10





  Metoprolol Tartrate 100 Mg Tab  PO   100 mg





  BID LASHA   Administration


 


Morphine Sulfate  2 mg  08/13/22 19:48 





  Morphine 2 Mg/1 Ml Inj  IV  





  Q4H PRN  





  Pain, Moderate (4-6)  


 


Ondansetron HCl  4 mg  08/13/22 19:48  08/19/22 21:56





  Ondansetron 4 Mg/2 Ml Inj  IV   4 mg





  Q8H PRN   Administration





  Nausea And Vomiting  


 


Oxycodone/Acetaminophen  1 tab  08/13/22 19:48 





  Oxycodone /Acetaminophen 5-325mg Tab  PO  





  Q6H PRN  





  Pain, Moderate (4-6)  


 


Sevelamer Carbonate  2,400 mg  08/14/22 12:30  08/21/22 08:38





  Sevelamer Carbonate 800 Mg Tab  PO   2,400 mg





  AC LASHA   Administration


 


Sodium Chloride  10 ml  08/13/22 22:00  08/21/22 09:10





  Sodium Chloride 0.9% 10 Ml Flush Syringe  IV   10 ml





  BID LASHA   Administration


 


Sodium Chloride  10 ml  08/13/22 19:48 





  Sodium Chloride 0.9% 10 Ml Flush Syringe  IV  





  PRN PRN  





  LINE FLUSH  


 


Zolpidem Tartrate  5 mg  08/18/22 01:56  08/18/22 02:18





  Zolpidem 5 Mg Tab  PO   5 mg





  QHS PRN   Administration





  Sleep

## 2022-08-22 LAB
BASOPHILS # (AUTO): 0 K/MM3 (ref 0–0.1)
BASOPHILS NFR BLD AUTO: 0.5 % (ref 0–1.8)
BUN SERPL-MCNC: 63 MG/DL (ref 9–20)
BUN/CREAT SERPL: 7 %
CALCIUM SERPL-MCNC: 8.1 MG/DL (ref 8.4–10.2)
EOSINOPHIL # BLD AUTO: 0.1 K/MM3 (ref 0–0.4)
EOSINOPHIL NFR BLD AUTO: 0.6 % (ref 0–4.3)
HCT VFR BLD CALC: 27 % (ref 35.5–45.6)
HEMOLYSIS INDEX: 0
HGB BLD-MCNC: 8.9 GM/DL (ref 11.8–15.2)
LYMPHOCYTES # BLD AUTO: 0.9 K/MM3 (ref 1.2–5.4)
LYMPHOCYTES NFR BLD AUTO: 9.7 % (ref 13.4–35)
MCHC RBC AUTO-ENTMCNC: 33 % (ref 32–34)
MCV RBC AUTO: 89 FL (ref 84–94)
MONOCYTES # (AUTO): 1.2 K/MM3 (ref 0–0.8)
MONOCYTES % (AUTO): 12.3 % (ref 0–7.3)
PLATELET # BLD: 289 K/MM3 (ref 140–440)
RBC # BLD AUTO: 3.03 M/MM3 (ref 3.65–5.03)

## 2022-08-22 PROCEDURE — 5A1D70Z PERFORMANCE OF URINARY FILTRATION, INTERMITTENT, LESS THAN 6 HOURS PER DAY: ICD-10-PCS | Performed by: INTERNAL MEDICINE

## 2022-08-22 RX ADMIN — GUAIFENESIN PRN MG: 100 SOLUTION ORAL at 05:58

## 2022-08-22 RX ADMIN — INSULIN LISPRO SCH UNIT: 100 INJECTION, SOLUTION INTRAVENOUS; SUBCUTANEOUS at 21:56

## 2022-08-22 RX ADMIN — METOPROLOL TARTRATE SCH MG: 100 TABLET, FILM COATED ORAL at 09:29

## 2022-08-22 RX ADMIN — Medication SCH ML: at 21:42

## 2022-08-22 RX ADMIN — GUAIFENESIN PRN MG: 100 SOLUTION ORAL at 21:42

## 2022-08-22 RX ADMIN — HEPARIN SODIUM SCH UNIT: 5000 INJECTION, SOLUTION INTRAVENOUS; SUBCUTANEOUS at 09:28

## 2022-08-22 RX ADMIN — SEVELAMER CARBONATE SCH MG: 800 TABLET, FILM COATED ORAL at 07:30

## 2022-08-22 RX ADMIN — HEPARIN SODIUM SCH UNIT: 5000 INJECTION, SOLUTION INTRAVENOUS; SUBCUTANEOUS at 21:41

## 2022-08-22 RX ADMIN — INSULIN LISPRO SCH: 100 INJECTION, SOLUTION INTRAVENOUS; SUBCUTANEOUS at 07:30

## 2022-08-22 RX ADMIN — Medication SCH ML: at 09:28

## 2022-08-22 RX ADMIN — EPOETIN ALFA-EPBX PRN UNIT: 10000 INJECTION, SOLUTION INTRAVENOUS; SUBCUTANEOUS at 12:28

## 2022-08-22 RX ADMIN — METOPROLOL TARTRATE SCH MG: 100 TABLET, FILM COATED ORAL at 21:41

## 2022-08-22 RX ADMIN — SEVELAMER CARBONATE SCH: 800 TABLET, FILM COATED ORAL at 11:25

## 2022-08-22 RX ADMIN — ZOLPIDEM TARTRATE PRN MG: 5 TABLET ORAL at 01:00

## 2022-08-22 RX ADMIN — INSULIN LISPRO SCH: 100 INJECTION, SOLUTION INTRAVENOUS; SUBCUTANEOUS at 11:24

## 2022-08-22 RX ADMIN — INSULIN LISPRO SCH: 100 INJECTION, SOLUTION INTRAVENOUS; SUBCUTANEOUS at 16:30

## 2022-08-22 RX ADMIN — SEVELAMER CARBONATE SCH MG: 800 TABLET, FILM COATED ORAL at 16:30

## 2022-08-22 RX ADMIN — ASPIRIN SCH MG: 81 TABLET, COATED ORAL at 09:29

## 2022-08-22 NOTE — PROGRESS NOTE
Assessment and Plan


Assessment and plan: 





51-year-old  seizure disorder hypertension type 2 diabetes and end-stage 

renal disease on hemodialysis presents with lethargy and confusion.  Family 

brought him to the emergency room stating that he is confused not oriented.  His

last known dialysis is not known.  Improvement on fluids.  No fever or chills.


No seizures.  Noncompliant with his medications.











Assessment and Plan:


(1) Acute metabolic encephalopathy (resolved)


Current Visit: Yes   Status: Acute   


Plan to address problem: 


Secondary to severe uremia.  His creatinine is 32.1 and BUN is 242.  Family does

not know how many days he has missed his hemodialysis.  


Patient to be counseled about compliance once he  is more alert and oriented.








(2) Hyperkalemia (improved)


Current Visit: Yes   Status: Acute   


Plan to address problem: 


Treated in the emergency room and patient going for hemodialysis


Correct with HD








(3) Metabolic acidosis (resolved)


Current Visit: Yes   Status: Acute   


Plan to address problem: 


Secondary to severe uremia.








(4) Seizure disorder


Current Visit: Yes   Status: Chronic   


Plan to address problem: 


Continue Keppra.








(5) T2DM (type 2 diabetes mellitus)


Current Visit: Yes   Status: Chronic   


Qualifiers: 


   Diabetes mellitus long term insulin use: unspecified long term insulin use 

status   Chronic kidney disease stage: on chronic dialysis 


Plan to address problem: 


Coverage for now








(6) ESRD needing dialysis


Current Visit: Yes   Status: Acute   


Plan to address problem: 


Continue hemodialysis


Nephrology following


Patient to be counseled about compliance








(7) Anemia


Current Visit: No   Status: Chronic   


Qualifiers: 


   Anemia type: due to chronic kidney disease 


Plan to address problem: 


Secondary to chronic kidney disease


Epogen as per nephrology


1 unit prbc given on 8/14.


Hgb and VSS currently stable.








(8) Hypertension


Current Visit: Yes   Status: Chronic   


Qualifiers: 


   Hypertension type: primary hypertension   Qualified Code(s): I10 - Essential 

(primary) hypertension   


Plan to address problem: 


Continue antihypertensives








(9) Malnutrition


Current Visit: Yes   Status: Chronic   


Qualifiers: 


   Malnutrition type: protein-calorie malnutrition   Protein-calorie 

malnutrition severity: severe   Qualified Code(s): E43 - Unspecified severe 

protein-calorie malnutrition   


Plan to address problem: 


Dietary supplements initiated


Dietitian consult requested





hospital course:


8/14: AOx4 on bedside encounter. Transfused 1 unit due to hgb 6.8. Patient st

ated he has now missed several dialysis sessions. He goes to [facility] per patient. will place CM consultation to ensure patient still has 

chair. Anticipate d/c tomorrow.





8/15: Hgb improved to 7.7. Will work with CM regarding dialysis chair as patient

prior chair due to noncompliance PTA.





8/16: Await case management decision regarding outpatient hemodialysis





8/17: Physical therapy recommends SNF placement.  We will discuss with case 

management disposition.  Continue hemodialysis per nephrology recommendations.





8/18: Awaiting for arrangements for hemodialysis associated with skilled nursing

facility or outpatient hemodialysis chair.  I discussed the case with case 

management





8/19:   Awaiting for arrangements for hemodialysis associated with skilled 

nursing facility or outpatient hemodialysis chair.  Patient is less confused and

appears to be back to baseline.





8/20:  Awaiting for arrangements for hemodialysis associated with skilled 

nursing facility or outpatient hemodialysis chair.  Patient appears to be 

confused this morning.  Continue restraints for safety





8/21:  Awaiting for arrangements for hemodialysis associated with skilled 

nursing facility or outpatient hemodialysis chair.  Patient still with 

metabolic/uremic encephalopathy.  Patient has had baseline cognitive decline.  

Continue restraints for safety





8/22: Patient remains confused and likely has had baseline cognitive decline.   

Awaiting for arrangements for hemodialysis associated with skilled nursing 

facility or outpatient hemodialysis chair.  Continue restraints for safety.





History


Interval history: 





No new issues overnight





Hospitalist Physical





- Constitutional


Vitals: 


                                        











Temp Pulse Resp BP Pulse Ox


 


 98.1 F   76   17   125/66   95 


 


 08/22/22 05:54  08/22/22 05:54  08/22/22 05:54  08/22/22 05:54  08/22/22 05:54











General appearance: Present: no acute distress, well-nourished





- EENT


Eyes: Present: PERRL, EOM intact


ENT: hearing intact, clear oral mucosa, dentition normal





- Neck


Neck: Present: supple, normal ROM





- Respiratory


Respiratory effort: normal


Respiratory: bilateral: CTA





- Cardiovascular


Rhythm: regular


Heart Sounds: Present: S1 & S2.  Absent: gallop, rub





- Extremities


Extremities: no ischemia, No edema, Full ROM





- Abdominal


General gastrointestinal: soft, non-tender, non-distended, normal bowel sounds





- Integumentary


Integumentary: Present: clear, warm, dry





- Neurologic


Neurologic: CNII-XII intact, moves all extremities





HEART Score





- HEART Score


Troponin: 


                                        











Troponin T  0.933 ng/mL (0.00-0.029)  H*  08/13/22  01:28    














Results





- Labs


CBC & Chem 7: 


                                 08/22/22 05:26





                                 08/22/22 05:26


Labs: 


                             Laboratory Last Values











WBC  9.3 K/mm3 (4.5-11.0)   08/22/22  05:26    


 


RBC  3.03 M/mm3 (3.65-5.03)  L  08/22/22  05:26    


 


Hgb  8.9 gm/dl (11.8-15.2)  L  08/22/22  05:26    


 


Hct  27.0 % (35.5-45.6)  L  08/22/22  05:26    


 


MCV  89 fl (84-94)   08/22/22  05:26    


 


MCH  29 pg (28-32)   08/22/22  05:26    


 


MCHC  33 % (32-34)   08/22/22  05:26    


 


RDW  15.3 % (13.2-15.2)  H  08/22/22  05:26    


 


Plt Count  289 K/mm3 (140-440)   08/22/22  05:26    


 


Lymph % (Auto)  9.7 % (13.4-35.0)  L  08/22/22  05:26    


 


Mono % (Auto)  12.3 % (0.0-7.3)  H  08/22/22  05:26    


 


Eos % (Auto)  0.6 % (0.0-4.3)   08/22/22  05:26    


 


Baso % (Auto)  0.5 % (0.0-1.8)   08/22/22  05:26    


 


Lymph # (Auto)  0.9 K/mm3 (1.2-5.4)  L  08/22/22  05:26    


 


Mono # (Auto)  1.2 K/mm3 (0.0-0.8)  H  08/22/22  05:26    


 


Eos # (Auto)  0.1 K/mm3 (0.0-0.4)   08/22/22  05:26    


 


Baso # (Auto)  0.0 K/mm3 (0.0-0.1)   08/22/22  05:26    


 


Seg Neutrophils %  76.9 % (40.0-70.0)  H  08/22/22  05:26    


 


Seg Neutrophils #  7.2 K/mm3 (1.8-7.7)   08/22/22  05:26    


 


PT  17.0 Sec. (12.2-14.9)  H  08/13/22  01:28    


 


INR  1.23  (0.87-1.13)  H  08/13/22  01:28    


 


Sodium  139 mmol/L (137-145)   08/22/22  05:26    


 


Potassium  3.8 mmol/L (3.6-5.0)   08/22/22  05:26    


 


Chloride  90.9 mmol/L ()  L  08/22/22  05:26    


 


Carbon Dioxide  36 mmol/L (22-30)  H D 08/22/22  05:26    


 


Anion Gap  16 mmol/L  08/22/22  05:26    


 


BUN  63 mg/dL (9-20)  H  08/22/22  05:26    


 


Creatinine  8.5 mg/dL (0.8-1.3)  H  08/22/22  05:26    


 


Estimated GFR  8 ml/min  08/22/22  05:26    


 


BUN/Creatinine Ratio  7 %  08/22/22  05:26    


 


Glucose  86 mg/dL ()   08/22/22  05:26    


 


POC Glucose  80 mg/dL ()   08/22/22  07:38    


 


Calcium  8.1 mg/dL (8.4-10.2)  L  08/22/22  05:26    


 


Phosphorus  8.30 mg/dL (2.5-4.5)  H  08/19/22  07:45    


 


Magnesium  2.20 mg/dL (1.7-2.3)   08/14/22  07:22    


 


Total Bilirubin  0.30 mg/dL (0.1-1.2)   08/15/22  04:23    


 


AST  16 units/L (5-40)   08/15/22  04:23    


 


ALT  16 units/L (7-56)   08/15/22  04:23    


 


Alkaline Phosphatase  60 units/L ()   08/15/22  04:23    


 


Ammonia  39.0 umol/L (25-60)   08/22/22  05:26    


 


Total Creatine Kinase  1147 units/L ()  H  08/13/22  01:28    


 


Troponin T  0.933 ng/mL (0.00-0.029)  H*  08/13/22  01:28    


 


Total Protein  6.3 g/dL (6.3-8.2)   08/15/22  04:23    


 


Albumin  2.4 g/dL (3.9-5)  L  08/15/22  04:23    


 


Albumin/Globulin Ratio  0.6 %  08/15/22  04:23    


 


Triglycerides  178 mg/dL (2-149)  H  08/13/22  01:28    


 


Cholesterol  112 mg/dL ()   08/13/22  01:28    


 


LDL Cholesterol Direct  30 mg/dL ()  L  08/13/22  01:28    


 


HDL Cholesterol  39 mg/dL (40-59)  L  08/13/22  01:28    


 


Cholesterol/HDL Ratio  2.87 %  08/13/22  01:28    


 


Salicylates  < 0.3 mg/dL (2.8-20.0)  L  08/13/22  01:28    


 


Acetaminophen  5.0 ug/mL (10.0-30.0)  L  08/13/22  01:28    


 


Plasma/Serum Alcohol  < 0.01 % (0-0.07)   08/13/22  01:28    


 


Coronavirus (PCR)  Negative  (Negative)   08/14/22  Unknown


 


SARS-CoV-2 (PCR)  Negative  (Negative)   08/15/22  11:05    


 


Hepatitis A IgM Ab  Non-reactive  (NonReactive)   08/13/22  01:28    


 


Hep Bs Antigen  Non-reactive  (Negative)   08/13/22  01:28    


 


Hep B Core IgM Ab  Non-reactive  (NonReactive)   08/13/22  01:28    


 


Hepatitis C Antibody  Non-reactive  (NonReactive)   08/13/22  01:28    


 


Blood Type  A POSITIVE   08/14/22  10:00    


 


Antibody Screen  Negative   08/14/22  10:00    


 


Crossmatch  See Detail   08/14/22  10:00    











Petty/IV: 


                                        





Voiding Method                   Condom Catheter











Active Medications





- Current Medications


Current Medications: 














Generic Name Dose Route Start Last Admin





  Trade Name Freq  PRN Reason Stop Dose Admin


 


Acetaminophen  650 mg  08/13/22 19:48 





  Acetaminophen 325 Mg Tab  PO  





  Q4H PRN  





  Pain MILD(1-3)/Fever >100.5/HA  


 


Aspirin  81 mg  08/14/22 10:00  08/21/22 09:10





  Aspirin Ec 81 Mg Tab  PO   81 mg





  QDAY LASHA   Administration


 


Dextrose  25 ml  08/20/22 22:03  08/20/22 23:21





  Dextrose 50% In Water (25gm) 50 Ml Syringe  IV   25 ml





  Q30MIN PRN   Administration





  Hypoglycemia  





  Protocol  


 


Epoetin Jesus-epbx  20,000 unit  08/16/22 21:57 





  Epoetin Jesus-Epbx 10,000 Unit/1 Ml Vial  SUB-Q  





  EUSEBIA PRN  





  hemodialysis  


 


Guaifenesin  200 mg  08/18/22 01:57  08/22/22 05:58





  Guaifenesin 100 Mg/5 Ml Oral Liqd  PO   200 mg





  Q4H PRN   Administration





  Cough  


 


Heparin Sodium (Porcine)  5,000 unit  08/13/22 22:00  08/21/22 22:04





  Heparin 5,000 Unit/1 Ml Vial  SUB-Q   5,000 unit





  Q12HR LASHA   Administration


 


Hydromorphone HCl  0.5 mg  08/13/22 19:48 





  Hydromorphone 0.5 Mg/0.5 Ml Inj  IV  





  Q3H PRN  





  Pain , Severe (7-10)  


 


Sodium Chloride  100 mls @ 999 mls/hr  08/19/22 08:23 





  Nacl 0.9%  IV  





  EUSEBIA PRN  





  Hypotension  


 


Insulin Human Lispro  0 unit  08/14/22 07:30  08/21/22 23:00





  Insulin Lispro 100 Unit/Ml  SUB-Q   1 unit





  ACHS LASHA   Administration





  Protocol  


 


Levetiracetam  500 mg  08/14/22 10:00  08/21/22 22:04





  Levetiracetam 500 Mg Tab  PO   500 mg





  BID LASHA   Administration


 


Metoclopramide HCl  5 mg  08/16/22 09:00  08/22/22 01:51





  Metoclopramide 10 Mg/2 Ml Inj  IV   5 mg





  Q8H PRN   Administration





  Nausea And Vomiting  


 


Metoprolol Tartrate  100 mg  08/14/22 10:00  08/21/22 22:05





  Metoprolol Tartrate 100 Mg Tab  PO   100 mg





  BID LASHA   Administration


 


Morphine Sulfate  2 mg  08/13/22 19:48 





  Morphine 2 Mg/1 Ml Inj  IV  





  Q4H PRN  





  Pain, Moderate (4-6)  


 


Ondansetron HCl  4 mg  08/13/22 19:48  08/19/22 21:56





  Ondansetron 4 Mg/2 Ml Inj  IV   4 mg





  Q8H PRN   Administration





  Nausea And Vomiting  


 


Oxycodone/Acetaminophen  1 tab  08/13/22 19:48 





  Oxycodone /Acetaminophen 5-325mg Tab  PO  





  Q6H PRN  





  Pain, Moderate (4-6)  


 


Sevelamer Carbonate  2,400 mg  08/14/22 12:30  08/21/22 17:07





  Sevelamer Carbonate 800 Mg Tab  PO   2,400 mg





  AC LASHA   Administration


 


Sodium Chloride  10 ml  08/13/22 22:00  08/21/22 22:05





  Sodium Chloride 0.9% 10 Ml Flush Syringe  IV   10 ml





  BID LASHA   Administration


 


Sodium Chloride  10 ml  08/13/22 19:48 





  Sodium Chloride 0.9% 10 Ml Flush Syringe  IV  





  PRN PRN  





  LINE FLUSH  


 


Zolpidem Tartrate  5 mg  08/18/22 01:56  08/22/22 01:00





  Zolpidem 5 Mg Tab  PO   5 mg





  QHS PRN   Administration





  Sleep  














Nutrition/Malnutrition Assess





- Dietary Evaluation


Nutrition/Malnutrition Findings: 


                                        





Nutrition Notes                                            Start:  08/16/22 

15:24


Freq:                                                      Status: Active       




Protocol:                                                                       




 Document     08/16/22 15:24  GLORIA  (Rec: 08/16/22 15:47  GLORIA  [id number])


 Nutrition Notes


     Need for Assessment generated from:         Low BMI


     Initial or Follow up                        Assessment


     Current Diagnosis                           CKD (stage V CKD),Diabetes,


                                                 Hypertension,Malnutrition


     Other Pertinent Diagnosis                   ESRD+HD, Seizure Disorder,


                                                 Anemia, s/p Metabolic


                                                 Encephalopathy.


     Current Diet                                Renal Diet (since D 08/13), D


                                                 Suppl (from D 08/16).


     Labs/Tests                                  08/16: Na 135, Cl 91.3, BUN 88


                                                 , Crea 12.3, Glu 113.


     Pertinent Medications                       08/16: Renvela, others


                                                 nutritionally unremarkable.


     Height                                      5 ft 9 in


     Weight                                      54.43 kg


     Ideal Body Weight (kg)                      72.72


     BMI                                         17.7


     Weight change and time frame                None provided at admission.


     Weight Status                               Underweight


     Subjective/Other Information                RD consult for Low BMI


                                                 assessment.


                                                 Pt's PO intake of meals has


                                                 been Fair (>50%) and fairly


                                                 tolerated, according to ADL


                                                 notes.


                                                 I will keep the dietary


                                                 supplemnets prescription to


                                                 compensate for poor or


                                                 insufficient PO intake of


                                                 meals during LOS.


                                                 Pt is on Room Air, O2


                                                 saturation @ 100%, according


                                                 to Physical Assessment History


                                                 notes.


                                                 Pt has missing teeth,


                                                 according to Physical


                                                 Assessment History notes.


                                                 Pt remains with diarrhea and


                                                 incontinent, according to


                                                 Physical Assessment History


                                                 notes.


                                                 Pt presents unspecified


                                                 dryness and flaking as signs


                                                 of concern for skin risk at


                                                 the time, according to


                                                 Physical Assessment History


                                                 notes.


                                                 Pt's Low BMI seems to


                                                 correspond to a natural body


                                                 composition, and not related


                                                 to a sudden loss of body


                                                 weight nor chronic


                                                 malnutrition, since no signs


                                                 of concern were mentioned in


                                                 the Physical Assessment


                                                 History or the Progress notes.


     Percent of energy/protein needs met:        Prescribed Renal Diet provides


                                                 for energy/protein needs (2,


                                                 072 Kcal/77 g) during LOS;


                                                 additionally, Dietary


                                                 Supplements will compensate


                                                 for possible poor or


                                                 insufficient PO intake of


                                                 meals with 1,275 Kcal and 57 g


                                                 of protein.


     Burn                                        Absent


     Trauma                                      Absent


     GI Symptoms                                 Diarrhea,Other


     Food Allergy                                No


     Skin Integrity/Comment                      Unspecified dryness and


                                                 flaking.


     Current % PO                                Fair (50-74%)


     Minimum of two criteria                     No


     Fluid Accumulation                          N/A


     Reduced  Strength                       N/A (non-severe)


     Protein-Calorie Malnutrition                N\A


     #1


      Nutrition Diagnosis                        Inadequate protein-energy


                                                 intake


      Etiology                                   Possibly associated with


                                                 Metabolic Encephalopathy.


      As Evidenced by Signs and Symptoms         Pt's PO intake of meals has


                                                 been Fair (>50%) and fairly


                                                 tolerated, according to ADL


                                                 notes.


     Is patient on ventilator?                   No


     Is Patient Ambulatory and/or Out of Bed     No


     REE-([facility]-confined to bed)      [phone]


     Kcal/Kg value to use for calculation        36


     Approximate Energy Requirements Using       1959


      kcal/Kg                                    


     Calculation Used for Recommendations        Kcal/kg


     Additional Notes                            Protein: >1.2 g/Kg ABW; >65 g/


                                                 day.


                                                 Fluids: 1 ml/Kcal, or as per


                                                 MD.


 Nutrition Intervention


     Change Diet Order:                          Continue Renal Diet as


                                                 tolerated.


     Add Supplement/Snack (indicate name/kcal    Start Nepro w/CARBSTEADY; TID.


      /protein )                                 


     Provides kCal:                              1,275


     Provides Protein (gm)                       57


     Goal #1                                     Compensate, through dietary


                                                 supplementation, for possible


                                                 poor or insufficient PO intake


                                                 of meals during LOS.


     Goal #2                                     Adjust the dietary


                                                 intervention to better serve


                                                 Pt's needs and clinical


                                                 conditions during LOS.


     Follow-Up By:                               08/23/22


     Additional Comments                         Continue monitoring food


                                                 tolerance, %PO intake of meals


                                                 , dietary supplements, and BM.

## 2022-08-22 NOTE — PROGRESS NOTE
Assessment and Plan


1. ESRD:


Patient is on maintenance HD.


Last outpatient HD unknown.


Meds dosage based on GFR.


Hemodialysis: 8/13, 8/15, 8/17, 8/19, 8/22.





2. FEN:


Hyperkalemia, improved, on HD.


Hypernatremia, improved.


Pn Phos binders.


Monitor lytes and volume status.





3. Acute Metabolic Encephalopathy, POA:


Suspected Uremic encephalopathy.


Baseline cognitive decline.


Improving, monitor.





4. Seizure:


On Keppra.





5. Normocytic anemia, POA:


S/p PRBC.


Epogen with HD.


Monitor.





6. T2DM (type 2 diabetes mellitus):


SSI.











Subjective:


Patient was seen and examined at the bedside.











Examination:


General appearance: well-developed, emaciated, no distress


HEENT: ATNC, pupils equal


Neck: trachea midline


Respiratory: Clear to Auscultation


Cardiology: regular, S1S2, no murmur


Gastrointestinal: soft, normoactive bowel sounds, not tender, ND


Integumentary: diffuse discrete papular rash, fading


Neurologic: alert, oriented to self, able to move extremities


Ext: no edema noted


Hemodialysis access: L arm AVF/AVG





Subjective


Date of service: 08/22/22





Objective





- Vital Signs


Vital signs: 


                               Vital Signs - 12hr











  08/22/22 08/22/22 08/22/22





  05:54 10:00 11:00


 


Temperature 98.1 F  97.6 F


 


Pulse Rate 76  75


 


Respiratory 17  18





Rate   


 


Blood Pressure   131/67


 


Blood Pressure 125/66  





[Right]   


 


O2 Sat by Pulse 95 99 





Oximetry   


 


O2 Sat by Pulse   100





Oximetry [   





Anterior   





Bilateral   





Throughout]   














  08/22/22 08/22/22 08/22/22





  11:15 11:30 11:45


 


Temperature   


 


Pulse Rate 74 65 76


 


Respiratory   





Rate   


 


Blood Pressure 116/62 125/67 117/58


 


Blood Pressure   





[Right]   


 


O2 Sat by Pulse   





Oximetry   


 


O2 Sat by Pulse   





Oximetry [   





Anterior   





Bilateral   





Throughout]   














  08/22/22 08/22/22 08/22/22





  12:00 12:15 12:30


 


Temperature   


 


Pulse Rate 80 79 81


 


Respiratory   





Rate   


 


Blood Pressure 122/61 122/65 116/59


 


Blood Pressure   





[Right]   


 


O2 Sat by Pulse   





Oximetry   


 


O2 Sat by Pulse   





Oximetry [   





Anterior   





Bilateral   





Throughout]   














  08/22/22 08/22/22 08/22/22





  12:45 13:00 13:15


 


Temperature   


 


Pulse Rate 79 79 81


 


Respiratory   





Rate   


 


Blood Pressure 114/58 111/57 115/61


 


Blood Pressure   





[Right]   


 


O2 Sat by Pulse   





Oximetry   


 


O2 Sat by Pulse   





Oximetry [   





Anterior   





Bilateral   





Throughout]   














  08/22/22 08/22/22 08/22/22





  13:30 13:45 14:00


 


Temperature   


 


Pulse Rate 78 80 83


 


Respiratory   





Rate   


 


Blood Pressure 120/60 113/52 119/60


 


Blood Pressure   





[Right]   


 


O2 Sat by Pulse   





Oximetry   


 


O2 Sat by Pulse   





Oximetry [   





Anterior   





Bilateral   





Throughout]   














  08/22/22 08/22/22





  14:15 14:40


 


Temperature  98.7 F


 


Pulse Rate 81 78


 


Respiratory  18





Rate  


 


Blood Pressure 127/65 124/63


 


Blood Pressure  





[Right]  


 


O2 Sat by Pulse  





Oximetry  


 


O2 Sat by Pulse  100





Oximetry [  





Anterior  





Bilateral  





Throughout]  














- Lab





                                 08/22/22 05:26





                                 08/22/22 05:26


                             Most recent lab results











Calcium  8.1 mg/dL (8.4-10.2)  L  08/22/22  05:26    


 


Phosphorus  8.30 mg/dL (2.5-4.5)  H  08/19/22  07:45    


 


Magnesium  2.20 mg/dL (1.7-2.3)   08/14/22  07:22    














Medications & Allergies





- Medications


Allergies/Adverse Reactions: 


                                    Allergies





No Known Allergies Allergy (Unverified 06/07/22 18:10)


   








Home Medications: 


                                Home Medications











 Medication  Instructions  Recorded  Confirmed  Last Taken  Type


 


Aspirin EC [Halfprin EC] 81 mg PO QDAY #30 tablet. 06/13/22 08/18/22 Unknown 

Rx


 


Metoprolol [Lopressor TAB] 100 mg PO BID #60 tablet 06/13/22 08/18/22 Unknown Rx


 


levETIRAcetam [Keppra TAB] 500 mg PO BID #60 tablet 06/13/22 08/18/22 Unknown Rx











Active Medications: 














Generic Name Dose Route Start Last Admin





  Trade Name Freq  PRN Reason Stop Dose Admin


 


Acetaminophen  650 mg  08/13/22 19:48 





  Acetaminophen 325 Mg Tab  PO  





  Q4H PRN  





  Pain MILD(1-3)/Fever >100.5/HA  


 


Aspirin  81 mg  08/14/22 10:00  08/22/22 09:29





  Aspirin Ec 81 Mg Tab  PO   81 mg





  QDAY LASHA   Administration


 


Dextrose  25 ml  08/20/22 22:03  08/20/22 23:21





  Dextrose 50% In Water (25gm) 50 Ml Syringe  IV   25 ml





  Q30MIN PRN   Administration





  Hypoglycemia  





  Protocol  


 


Epoetin Jesus-epbx  20,000 unit  08/16/22 21:57  08/22/22 12:28





  Epoetin Jesus-Epbx 10,000 Unit/1 Ml Vial  SUB-Q   20,000 unit





  EUSEBIA PRN   Administration





  hemodialysis  


 


Guaifenesin  200 mg  08/18/22 01:57  08/22/22 05:58





  Guaifenesin 100 Mg/5 Ml Oral Liqd  PO   200 mg





  Q4H PRN   Administration





  Cough  


 


Heparin Sodium (Porcine)  5,000 unit  08/13/22 22:00  08/22/22 09:28





  Heparin 5,000 Unit/1 Ml Vial  SUB-Q   5,000 unit





  Q12HR LASHA   Administration


 


Hydromorphone HCl  0.5 mg  08/13/22 19:48 





  Hydromorphone 0.5 Mg/0.5 Ml Inj  IV  





  Q3H PRN  





  Pain , Severe (7-10)  


 


Sodium Chloride  100 mls @ 999 mls/hr  08/19/22 08:23 





  Nacl 0.9%  IV  





  EUSEBIA PRN  





  Hypotension  


 


Insulin Human Lispro  0 unit  08/14/22 07:30  08/22/22 11:24





  Insulin Lispro 100 Unit/Ml  SUB-Q   Not Given





  [facility]  





  Protocol  


 


Levetiracetam  500 mg  08/14/22 10:00  08/22/22 09:29





  Levetiracetam 500 Mg Tab  PO   500 mg





  BID LASHA   Administration


 


Metoclopramide HCl  5 mg  08/16/22 09:00  08/22/22 01:51





  Metoclopramide 10 Mg/2 Ml Inj  IV   5 mg





  Q8H PRN   Administration





  Nausea And Vomiting  


 


Metoprolol Tartrate  100 mg  08/14/22 10:00  08/22/22 09:29





  Metoprolol Tartrate 100 Mg Tab  PO   100 mg





  BID LASHA   Administration


 


Morphine Sulfate  2 mg  08/13/22 19:48 





  Morphine 2 Mg/1 Ml Inj  IV  





  Q4H PRN  





  Pain, Moderate (4-6)  


 


Ondansetron HCl  4 mg  08/13/22 19:48  08/19/22 21:56





  Ondansetron 4 Mg/2 Ml Inj  IV   4 mg





  Q8H PRN   Administration





  Nausea And Vomiting  


 


Oxycodone/Acetaminophen  1 tab  08/13/22 19:48 





  Oxycodone /Acetaminophen 5-325mg Tab  PO  





  Q6H PRN  





  Pain, Moderate (4-6)  


 


Sevelamer Carbonate  2,400 mg  08/14/22 12:30  08/22/22 11:25





  Sevelamer Carbonate 800 Mg Tab  PO   Not Given





  AC LASHA  


 


Sodium Chloride  10 ml  08/13/22 22:00  08/22/22 09:28





  Sodium Chloride 0.9% 10 Ml Flush Syringe  IV   10 ml





  BID LASHA   Administration


 


Sodium Chloride  10 ml  08/13/22 19:48 





  Sodium Chloride 0.9% 10 Ml Flush Syringe  IV  





  PRN PRN  





  LINE FLUSH  


 


Zolpidem Tartrate  5 mg  08/18/22 01:56  08/22/22 01:00





  Zolpidem 5 Mg Tab  PO   5 mg





  QHS PRN   Administration





  Sleep

## 2022-08-23 LAB
BASOPHILS # (AUTO): 0.1 K/MM3 (ref 0–0.1)
BASOPHILS NFR BLD AUTO: 0.7 % (ref 0–1.8)
BUN SERPL-MCNC: 39 MG/DL (ref 9–20)
BUN/CREAT SERPL: 7 %
CALCIUM SERPL-MCNC: 8.2 MG/DL (ref 8.4–10.2)
EOSINOPHIL # BLD AUTO: 0 K/MM3 (ref 0–0.4)
EOSINOPHIL NFR BLD AUTO: 0.4 % (ref 0–4.3)
HCT VFR BLD CALC: 21.8 % (ref 35.5–45.6)
HEMOLYSIS INDEX: 4
HGB BLD-MCNC: 7.5 GM/DL (ref 11.8–15.2)
LYMPHOCYTES # BLD AUTO: 1 K/MM3 (ref 1.2–5.4)
LYMPHOCYTES NFR BLD AUTO: 11.1 % (ref 13.4–35)
MCHC RBC AUTO-ENTMCNC: 35 % (ref 32–34)
MCV RBC AUTO: 89 FL (ref 84–94)
MONOCYTES # (AUTO): 1.4 K/MM3 (ref 0–0.8)
MONOCYTES % (AUTO): 15.5 % (ref 0–7.3)
PLATELET # BLD: 228 K/MM3 (ref 140–440)
RBC # BLD AUTO: 2.44 M/MM3 (ref 3.65–5.03)

## 2022-08-23 RX ADMIN — INSULIN LISPRO SCH: 100 INJECTION, SOLUTION INTRAVENOUS; SUBCUTANEOUS at 22:24

## 2022-08-23 RX ADMIN — METOPROLOL TARTRATE SCH MG: 100 TABLET, FILM COATED ORAL at 21:26

## 2022-08-23 RX ADMIN — HEPARIN SODIUM SCH UNIT: 5000 INJECTION, SOLUTION INTRAVENOUS; SUBCUTANEOUS at 21:28

## 2022-08-23 RX ADMIN — SEVELAMER CARBONATE SCH MG: 800 TABLET, FILM COATED ORAL at 07:30

## 2022-08-23 RX ADMIN — HEPARIN SODIUM SCH UNIT: 5000 INJECTION, SOLUTION INTRAVENOUS; SUBCUTANEOUS at 09:38

## 2022-08-23 RX ADMIN — SEVELAMER CARBONATE SCH MG: 800 TABLET, FILM COATED ORAL at 11:30

## 2022-08-23 RX ADMIN — Medication SCH ML: at 21:27

## 2022-08-23 RX ADMIN — INSULIN LISPRO SCH: 100 INJECTION, SOLUTION INTRAVENOUS; SUBCUTANEOUS at 11:30

## 2022-08-23 RX ADMIN — Medication SCH ML: at 09:40

## 2022-08-23 RX ADMIN — INSULIN LISPRO SCH: 100 INJECTION, SOLUTION INTRAVENOUS; SUBCUTANEOUS at 16:30

## 2022-08-23 RX ADMIN — METOPROLOL TARTRATE SCH MG: 100 TABLET, FILM COATED ORAL at 09:39

## 2022-08-23 RX ADMIN — ASPIRIN SCH MG: 81 TABLET, COATED ORAL at 09:40

## 2022-08-23 RX ADMIN — SEVELAMER CARBONATE SCH MG: 800 TABLET, FILM COATED ORAL at 16:30

## 2022-08-23 RX ADMIN — INSULIN LISPRO SCH: 100 INJECTION, SOLUTION INTRAVENOUS; SUBCUTANEOUS at 07:30

## 2022-08-23 NOTE — PROGRESS NOTE
Assessment and Plan


Assessment and plan: 


51-year-old  seizure disorder hypertension type 2 diabetes and end-stage 

renal disease on hemodialysis presents with lethargy and confusion.  Family 

brought him to the emergency room stating that he is confused not oriented.  His

last known dialysis is not known.  Improvement on fluids.  No fever or chills.


No seizures.  Noncompliant with his medications.





Assessment and Plan:


(1) Acute metabolic encephalopathy (resolved)


Secondary to severe uremia.  His creatinine is 32.1 and BUN is 242.  


Family does not know how many days he has missed his hemodialysis.  


Patient to be counseled about compliance once he  is more alert and oriented.





(2) Hyperkalemia (improved)


Treated in the emergency room and patient going for hemodialysis


Correct with HD,





(3) Metabolic acidosis (resolved)


Secondary to severe uremia.  ESRD


Improved after dialysis





(4) Seizure disorder


Continue Keppra.,  Seizure precautions


Supportive care





(5) T2DM (type 2 diabetes mellitus)


Coverage for now, [employer] sliding scale coverage


ADA diet





(6) ESRD needing dialysis


Continue hemodialysis


Nephrology following


Patient to be counseled about compliance





(7) Anemia of chronic kidney disease


Secondary to chronic kidney disease


Epogen as per nephrology


1 unit prbc given on 8/14.


Hgb and VSS currently stable.


Closely monitor H&H transfuse additional PRBC as needed





(8) Hypertension


Continue antihypertensives





(9) severe protein calorie malnutrition


Dietary supplements initiated


Dietitian consult requested





hospital course:


8/14: AOx4 on bedside encounter. Transfused 1 unit due to hgb 6.8. Patient 

stated he has now missed several dialysis sessions. He goes to [facility] per patient. will place CM consultation to ensure patient still has 

chair. Anticipate d/c tomorrow.





8/15: Hgb improved to 7.7. Will work with CM regarding dialysis chair as patient

prior chair due to noncompliance PTA.





8/16: Await case management decision regarding outpatient hemodialysis





8/17: Physical therapy recommends SNF placement.  We will discuss with case 

management disposition.  Continue hemodialysis per nephrology recommendations.





8/18: Awaiting for arrangements for hemodialysis associated with skilled nursing

facility or outpatient hemodialysis chair.  I discussed the case with case 

management





8/19:   Awaiting for arrangements for hemodialysis associated with skilled 

nursing facility or outpatient hemodialysis chair.  Patient is less confused and

appears to be back to baseline.





8/20:  Awaiting for arrangements for hemodialysis associated with skilled 

nursing facility or outpatient hemodialysis chair.  Patient appears to be 

confused this morning.  Continue restraints for safety





8/21:  Awaiting for arrangements for hemodialysis associated with skilled 

nursing facility or outpatient hemodialysis chair.  Patient still with 

metabolic/uremic encephalopathy.  Patient has had baseline cognitive decline.  

Continue restraints for safety





8/22: Patient remains confused and likely has had baseline cognitive decline.   

Awaiting for arrangements for hemodialysis associated with skilled nursing 

facility or outpatient hemodialysis chair.  Continue restraints for safety.





8/23; awaiting SNF/HD facility placement, DC planning per case management








History


Interval history: 


I have seen and examined the patient at the bedside


Patient's chart and medications reviewed 


Vital signs noted patient is confused, agitated


Restraint for safety


Patient is chronically ill cachectic emaciated


Mild distress ,vital signs noted





Hospitalist Physical





- Constitutional


Vitals: 


                                        











Temp Pulse Resp BP Pulse Ox


 


 98.0 F   53 L  16   121/58   95 


 


 08/22/22 21:10  08/22/22 21:41  08/22/22 21:10  08/22/22 21:41  08/22/22 22:00











General appearance: Present: no acute distress, well-nourished





- EENT


Eyes: Present: PERRL, EOM intact





- Neck


Neck: Present: supple, normal ROM





- Respiratory


Respiratory effort: normal


Respiratory: bilateral: diminished, negative: rales, rhonchi, wheezing





- Cardiovascular


Rhythm: regular


Heart Sounds: Present: S1 & S2





- Extremities


Extremities: no ischemia, No edema





- Abdominal


General gastrointestinal: soft, non-tender, non-distended, normal bowel sounds





- Integumentary


Integumentary: Present: clear, warm





- Psychiatric


Psychiatric: appropriate mood/affect, cooperative





- Neurologic


Neurologic: moves all extremities





HEART Score





- HEART Score


Troponin: 


                                        











Troponin T  0.933 ng/mL (0.00-0.029)  H*  08/13/22  01:28    














Results





- Labs


CBC & Chem 7: 


                                 08/23/22 05:42





                                 08/23/22 05:42


Labs: 


                             Laboratory Last Values











WBC  9.1 K/mm3 (4.5-11.0)   08/23/22  05:42    


 


RBC  2.44 M/mm3 (3.65-5.03)  L  08/23/22  05:42    


 


Hgb  7.5 gm/dl (11.8-15.2)  L  08/23/22  05:42    


 


Hct  21.8 % (35.5-45.6)  L  08/23/22  05:42    


 


MCV  89 fl (84-94)   08/23/22  05:42    


 


MCH  31 pg (28-32)   08/23/22  05:42    


 


MCHC  35 % (32-34)  H  08/23/22  05:42    


 


RDW  16.0 % (13.2-15.2)  H  08/23/22  05:42    


 


Plt Count  228 K/mm3 (140-440)   08/23/22  05:42    


 


Lymph % (Auto)  11.1 % (13.4-35.0)  L  08/23/22  05:42    


 


Mono % (Auto)  15.5 % (0.0-7.3)  H  08/23/22  05:42    


 


Eos % (Auto)  0.4 % (0.0-4.3)   08/23/22  05:42    


 


Baso % (Auto)  0.7 % (0.0-1.8)   08/23/22  05:42    


 


Lymph # (Auto)  1.0 K/mm3 (1.2-5.4)  L  08/23/22  05:42    


 


Mono # (Auto)  1.4 K/mm3 (0.0-0.8)  H  08/23/22  05:42    


 


Eos # (Auto)  0.0 K/mm3 (0.0-0.4)   08/23/22  05:42    


 


Baso # (Auto)  0.1 K/mm3 (0.0-0.1)   08/23/22  05:42    


 


Seg Neutrophils %  72.3 % (40.0-70.0)  H  08/23/22  05:42    


 


Seg Neutrophils #  6.6 K/mm3 (1.8-7.7)   08/23/22  05:42    


 


PT  17.0 Sec. (12.2-14.9)  H  08/13/22  01:28    


 


INR  1.23  (0.87-1.13)  H  08/13/22  01:28    


 


Sodium  140 mmol/L (137-145)   08/23/22  05:42    


 


Potassium  4.1 mmol/L (3.6-5.0)   08/23/22  05:42    


 


Chloride  97.0 mmol/L ()  L  08/23/22  05:42    


 


Carbon Dioxide  31 mmol/L (22-30)  H  08/23/22  05:42    


 


Anion Gap  16 mmol/L  08/23/22  05:42    


 


BUN  39 mg/dL (9-20)  H  08/23/22  05:42    


 


Creatinine  5.9 mg/dL (0.8-1.3)  H  08/23/22  05:42    


 


Estimated GFR  12 ml/min  08/23/22  05:42    


 


BUN/Creatinine Ratio  7 %  08/23/22  05:42    


 


Glucose  80 mg/dL ()   08/23/22  05:42    


 


POC Glucose  180 mg/dL ()  H  08/22/22  21:51    


 


Calcium  8.2 mg/dL (8.4-10.2)  L  08/23/22  05:42    


 


Phosphorus  8.30 mg/dL (2.5-4.5)  H  08/19/22  07:45    


 


Magnesium  2.20 mg/dL (1.7-2.3)   08/14/22  07:22    


 


Total Bilirubin  0.30 mg/dL (0.1-1.2)   08/15/22  04:23    


 


AST  16 units/L (5-40)   08/15/22  04:23    


 


ALT  16 units/L (7-56)   08/15/22  04:23    


 


Alkaline Phosphatase  60 units/L ()   08/15/22  04:23    


 


Ammonia  39.0 umol/L (25-60)   08/22/22  05:26    


 


Total Creatine Kinase  1147 units/L ()  H  08/13/22  01:28    


 


Troponin T  0.933 ng/mL (0.00-0.029)  H*  08/13/22  01:28    


 


Total Protein  6.3 g/dL (6.3-8.2)   08/15/22  04:23    


 


Albumin  2.4 g/dL (3.9-5)  L  08/15/22  04:23    


 


Albumin/Globulin Ratio  0.6 %  08/15/22  04:23    


 


Triglycerides  178 mg/dL (2-149)  H  08/13/22  01:28    


 


Cholesterol  112 mg/dL ()   08/13/22  01:28    


 


LDL Cholesterol Direct  30 mg/dL ()  L  08/13/22  01:28    


 


HDL Cholesterol  39 mg/dL (40-59)  L  08/13/22  01:28    


 


Cholesterol/HDL Ratio  2.87 %  08/13/22  01:28    


 


Salicylates  < 0.3 mg/dL (2.8-20.0)  L  08/13/22  01:28    


 


Acetaminophen  5.0 ug/mL (10.0-30.0)  L  08/13/22  01:28    


 


Plasma/Serum Alcohol  < 0.01 % (0-0.07)   08/13/22  01:28    


 


Coronavirus (PCR)  Negative  (Negative)   08/14/22  Unknown


 


SARS-CoV-2 (PCR)  Negative  (Negative)   08/15/22  11:05    


 


Hepatitis A IgM Ab  Non-reactive  (NonReactive)   08/13/22  01:28    


 


Hep Bs Antigen  Non-reactive  (Negative)   08/13/22  01:28    


 


Hep B Core IgM Ab  Non-reactive  (NonReactive)   08/13/22  01:28    


 


Hepatitis C Antibody  Non-reactive  (NonReactive)   08/13/22  01:28    


 


Blood Type  A POSITIVE   08/14/22  10:00    


 


Antibody Screen  Negative   08/14/22  10:00    


 


Crossmatch  See Detail   08/14/22  10:00    











Petty/IV: 


                                        





Voiding Method                   Condom Catheter











Active Medications





- Current Medications


Current Medications: 














Generic Name Dose Route Start Last Admin





  Trade Name Freq  PRN Reason Stop Dose Admin


 


Acetaminophen  650 mg  08/13/22 19:48 





  Acetaminophen 325 Mg Tab  PO  





  Q4H PRN  





  Pain MILD(1-3)/Fever >100.5/HA  


 


Aspirin  81 mg  08/14/22 10:00  08/22/22 09:29





  Aspirin Ec 81 Mg Tab  PO   81 mg





  QDAY LASHA   Administration


 


Dextrose  25 ml  08/20/22 22:03  08/20/22 23:21





  Dextrose 50% In Water (25gm) 50 Ml Syringe  IV   25 ml





  Q30MIN PRN   Administration





  Hypoglycemia  





  Protocol  


 


Epoetin Jesus-epbx  20,000 unit  08/16/22 21:57  08/22/22 12:28





  Epoetin Jesus-Epbx 10,000 Unit/1 Ml Vial  SUB-Q   20,000 unit





  EUSEBIA PRN   Administration





  hemodialysis  


 


Guaifenesin  200 mg  08/18/22 01:57  08/22/22 21:42





  Guaifenesin 100 Mg/5 Ml Oral Liqd  PO   200 mg





  Q4H PRN   Administration





  Cough  


 


Heparin Sodium (Porcine)  5,000 unit  08/13/22 22:00  08/22/22 21:41





  Heparin 5,000 Unit/1 Ml Vial  SUB-Q   5,000 unit





  Q12HR LASHA   Administration


 


Hydromorphone HCl  0.5 mg  08/13/22 19:48 





  Hydromorphone 0.5 Mg/0.5 Ml Inj  IV  





  Q3H PRN  





  Pain , Severe (7-10)  


 


Sodium Chloride  100 mls @ 999 mls/hr  08/19/22 08:23 





  Nacl 0.9%  IV  





  EUSEBIA PRN  





  Hypotension  


 


Insulin Human Lispro  0 unit  08/14/22 07:30  08/22/22 21:56





  Insulin Lispro 100 Unit/Ml  SUB-Q   2 unit





  ACHS LSAHA   Administration





  Protocol  


 


Levetiracetam  500 mg  08/14/22 10:00  08/22/22 21:41





  Levetiracetam 500 Mg Tab  PO   500 mg





  BID LASHA   Administration


 


Metoclopramide HCl  5 mg  08/16/22 09:00  08/22/22 01:51





  Metoclopramide 10 Mg/2 Ml Inj  IV   5 mg





  Q8H PRN   Administration





  Nausea And Vomiting  


 


Metoprolol Tartrate  100 mg  08/14/22 10:00  08/22/22 21:41





  Metoprolol Tartrate 100 Mg Tab  PO   100 mg





  BID LASHA   Administration


 


Morphine Sulfate  2 mg  08/13/22 19:48 





  Morphine 2 Mg/1 Ml Inj  IV  





  Q4H PRN  





  Pain, Moderate (4-6)  


 


Ondansetron HCl  4 mg  08/13/22 19:48  08/19/22 21:56





  Ondansetron 4 Mg/2 Ml Inj  IV   4 mg





  Q8H PRN   Administration





  Nausea And Vomiting  


 


Oxycodone/Acetaminophen  1 tab  08/13/22 19:48 





  Oxycodone /Acetaminophen 5-325mg Tab  PO  





  Q6H PRN  





  Pain, Moderate (4-6)  


 


Sevelamer Carbonate  2,400 mg  08/14/22 12:30  08/22/22 16:30





  Sevelamer Carbonate 800 Mg Tab  PO   2,400 mg





  AC LASHA   Administration


 


Sodium Chloride  10 ml  08/13/22 22:00  08/22/22 21:42





  Sodium Chloride 0.9% 10 Ml Flush Syringe  IV   10 ml





  BID LASHA   Administration


 


Sodium Chloride  10 ml  08/13/22 19:48 





  Sodium Chloride 0.9% 10 Ml Flush Syringe  IV  





  PRN PRN  





  LINE FLUSH  


 


Zolpidem Tartrate  5 mg  08/18/22 01:56  08/22/22 01:00





  Zolpidem 5 Mg Tab  PO   5 mg





  QHS PRN   Administration





  Sleep  














Nutrition/Malnutrition Assess





- Dietary Evaluation


Nutrition/Malnutrition Findings: 


                                        





Nutrition Notes                                            Start:  08/16/22 

15:24


Freq:                                                      Status: Active       




Protocol:                                                                       




 Document     08/16/22 15:24  GLORIA  (Rec: 08/16/22 15:47  GLORIA  [id number])


 Nutrition Notes


     Need for Assessment generated from:         Low BMI


     Initial or Follow up                        Assessment


     Current Diagnosis                           CKD (stage V CKD),Diabetes,


                                                 Hypertension,Malnutrition


     Other Pertinent Diagnosis                   ESRD+HD, Seizure Disorder,


                                                 Anemia, s/p Metabolic


                                                 Encephalopathy.


     Current Diet                                Renal Diet (since D 08/13), D


                                                 Suppl (from D 08/16).


     Labs/Tests                                  08/16: Na 135, Cl 91.3, BUN 88


                                                 , Crea 12.3, Glu 113.


     Pertinent Medications                       08/16: Renvela, others


                                                 nutritionally unremarkable.


     Height                                      5 ft 9 in


     Weight                                      54.43 kg


     Ideal Body Weight (kg)                      72.72


     BMI                                         17.7


     Weight change and time frame                None provided at admission.


     Weight Status                               Underweight


     Subjective/Other Information                RD consult for Low BMI


                                                 assessment.


                                                 Pt's PO intake of meals has


                                                 been Fair (>50%) and fairly


                                                 tolerated, according to ADL


                                                 notes.


                                                 I will keep the dietary


                                                 supplemnets prescription to


                                                 compensate for poor or


                                                 insufficient PO intake of


                                                 meals during LOS.


                                                 Pt is on Room Air, O2


                                                 saturation @ 100%, according


                                                 to Physical Assessment History


                                                 notes.


                                                 Pt has missing teeth,


                                                 according to Physical


                                                 Assessment History notes.


                                                 Pt remains with diarrhea and


                                                 incontinent, according to


                                                 Physical Assessment History


                                                 notes.


                                                 Pt presents unspecified


                                                 dryness and flaking as signs


                                                 of concern for skin risk at


                                                 the time, according to


                                                 Physical Assessment History


                                                 notes.


                                                 Pt's Low BMI seems to


                                                 correspond to a natural body


                                                 composition, and not related


                                                 to a sudden loss of body


                                                 weight nor chronic


                                                 malnutrition, since no signs


                                                 of concern were mentioned in


                                                 the Physical Assessment


                                                 History or the Progress notes.


     Percent of energy/protein needs met:        Prescribed Renal Diet provides


                                                 for energy/protein needs (2,


                                                 072 Kcal/77 g) during LOS;


                                                 additionally, Dietary


                                                 Supplements will compensate


                                                 for possible poor or


                                                 insufficient PO intake of


                                                 meals with 1,275 Kcal and 57 g


                                                 of protein.


     Burn                                        Absent


     Trauma                                      Absent


     GI Symptoms                                 Diarrhea,Other


     Food Allergy                                No


     Skin Integrity/Comment                      Unspecified dryness and


                                                 flaking.


     Current % PO                                Fair (50-74%)


     Minimum of two criteria                     No


     Fluid Accumulation                          N/A


     Reduced  Strength                       N/A (non-severe)


     Protein-Calorie Malnutrition                N\A


     #1


      Nutrition Diagnosis                        Inadequate protein-energy


                                                 intake


      Etiology                                   Possibly associated with


                                                 Metabolic Encephalopathy.


      As Evidenced by Signs and Symptoms         Pt's PO intake of meals has


                                                 been Fair (>50%) and fairly


                                                 tolerated, according to ADL


                                                 notes.


     Is patient on ventilator?                   No


     Is Patient Ambulatory and/or Out of Bed     No


     REE-([facility]-confined to bed)      [phone]


     Kcal/Kg value to use for calculation        36


     Approximate Energy Requirements Using       1959


      kcal/Kg                                    


     Calculation Used for Recommendations        Kcal/kg


     Additional Notes                            Protein: >1.2 g/Kg ABW; >65 g/


                                                 day.


                                                 Fluids: 1 ml/Kcal, or as per


                                                 MD.


 Nutrition Intervention


     Change Diet Order:                          Continue Renal Diet as


                                                 tolerated.


     Add Supplement/Snack (indicate name/kcal    Start Nepro w/CARBSTEADY; TID.


      /protein )                                 


     Provides kCal:                              1,275


     Provides Protein (gm)                       57


     Goal #1                                     Compensate, through dietary


                                                 supplementation, for possible


                                                 poor or insufficient PO intake


                                                 of meals during LOS.


     Goal #2                                     Adjust the dietary


                                                 intervention to better serve


                                                 Pt's needs and clinical


                                                 conditions during LOS.


     Follow-Up By:                               08/23/22


     Additional Comments                         Continue monitoring food


                                                 tolerance, %PO intake of meals


                                                 , dietary supplements, and BM.

## 2022-08-23 NOTE — PROGRESS NOTE
Assessment and Plan


1. ESRD:


Patient is on maintenance HD.


Last outpatient HD unknown.


Meds dosage based on GFR.


Hemodialysis: 8/13, 8/15, 8/17, 8/19, 8/22.





2. FEN:


Hyperkalemia, improved, on HD.


Hypernatremia, improved.


On Phos binders.


Monitor lytes and volume status.





3. Acute Metabolic Encephalopathy, POA:


Suspected Uremic encephalopathy.


Baseline cognitive decline.


Improving, monitor.





4. Seizure:


On Keppra.





5. Normocytic anemia, POA:


S/p PRBC.


Epogen with HD.


Monitor.





6. T2DM (type 2 diabetes mellitus):


SSI.











Subjective:


Patient was seen and examined at the bedside.











Examination:


General appearance: well-developed, emaciated, no distress, in restrains


HEENT: ATNC, pupils equal


Neck: trachea midline


Respiratory: Clear to Auscultation


Cardiology: regular, S1S2, no murmur


Gastrointestinal: soft, normoactive bowel sounds, not tender, ND


Integumentary: diffuse discrete papular rash, fading


Neurologic: alert, oriented to self, able to move extremities


Ext: no edema noted


Hemodialysis access: L arm AVF/AVG





Subjective


Date of service: 08/23/22





Objective





- Lab





                                 08/23/22 05:42





                                 08/23/22 05:42


                             Most recent lab results











Calcium  8.2 mg/dL (8.4-10.2)  L  08/23/22  05:42    


 


Phosphorus  8.30 mg/dL (2.5-4.5)  H  08/19/22  07:45    


 


Magnesium  2.20 mg/dL (1.7-2.3)   08/14/22  07:22    














Medications & Allergies





- Medications


Allergies/Adverse Reactions: 


                                    Allergies





No Known Allergies Allergy (Unverified 06/07/22 18:10)


   








Home Medications: 


                                Home Medications











 Medication  Instructions  Recorded  Confirmed  Last Taken  Type


 


Aspirin EC [Halfprin EC] 81 mg PO QDAY #30 tablet. 06/13/22 08/18/22 Unknown 

Rx


 


Metoprolol [Lopressor TAB] 100 mg PO BID #60 tablet 06/13/22 08/18/22 Unknown Rx


 


levETIRAcetam [Keppra TAB] 500 mg PO BID #60 tablet 06/13/22 08/18/22 Unknown Rx











Active Medications: 














Generic Name Dose Route Start Last Admin





  Trade Name Freq  PRN Reason Stop Dose Admin


 


Acetaminophen  650 mg  08/13/22 19:48 





  Acetaminophen 325 Mg Tab  PO  





  Q4H PRN  





  Pain MILD(1-3)/Fever >100.5/HA  


 


Aspirin  81 mg  08/14/22 10:00  08/23/22 09:40





  Aspirin Ec 81 Mg Tab  PO   81 mg





  QDAY LASHA   Administration


 


Dextrose  25 ml  08/20/22 22:03  08/20/22 23:21





  Dextrose 50% In Water (25gm) 50 Ml Syringe  IV   25 ml





  Q30MIN PRN   Administration





  Hypoglycemia  





  Protocol  


 


Epoetin Jesus-epbx  20,000 unit  08/16/22 21:57  08/22/22 12:28





  Epoetin Jesus-Epbx 10,000 Unit/1 Ml Vial  SUB-Q   20,000 unit





  EUSEBIA PRN   Administration





  hemodialysis  


 


Guaifenesin  200 mg  08/18/22 01:57  08/22/22 21:42





  Guaifenesin 100 Mg/5 Ml Oral Liqd  PO   200 mg





  Q4H PRN   Administration





  Cough  


 


Heparin Sodium (Porcine)  5,000 unit  08/13/22 22:00  08/23/22 09:38





  Heparin 5,000 Unit/1 Ml Vial  SUB-Q   5,000 unit





  Q12HR LASHA   Administration


 


Hydromorphone HCl  0.5 mg  08/13/22 19:48 





  Hydromorphone 0.5 Mg/0.5 Ml Inj  IV  





  Q3H PRN  





  Pain , Severe (7-10)  


 


Sodium Chloride  100 mls @ 999 mls/hr  08/19/22 08:23 





  Nacl 0.9%  IV  





  EUSEBIA PRN  





  Hypotension  


 


Insulin Human Lispro  0 unit  08/14/22 07:30  08/23/22 07:30





  Insulin Lispro 100 Unit/Ml  SUB-Q   Not Given





  [facility]  





  Protocol  


 


Levetiracetam  500 mg  08/14/22 10:00  08/23/22 09:39





  Levetiracetam 500 Mg Tab  PO   500 mg





  BID LASHA   Administration


 


Metoclopramide HCl  5 mg  08/16/22 09:00  08/22/22 01:51





  Metoclopramide 10 Mg/2 Ml Inj  IV   5 mg





  Q8H PRN   Administration





  Nausea And Vomiting  


 


Metoprolol Tartrate  100 mg  08/14/22 10:00  08/23/22 09:39





  Metoprolol Tartrate 100 Mg Tab  PO   100 mg





  BID LASHA   Administration


 


Morphine Sulfate  2 mg  08/13/22 19:48 





  Morphine 2 Mg/1 Ml Inj  IV  





  Q4H PRN  





  Pain, Moderate (4-6)  


 


Ondansetron HCl  4 mg  08/13/22 19:48  08/19/22 21:56





  Ondansetron 4 Mg/2 Ml Inj  IV   4 mg





  Q8H PRN   Administration





  Nausea And Vomiting  


 


Oxycodone/Acetaminophen  1 tab  08/13/22 19:48 





  Oxycodone /Acetaminophen 5-325mg Tab  PO  





  Q6H PRN  





  Pain, Moderate (4-6)  


 


Sevelamer Carbonate  2,400 mg  08/14/22 12:30  08/23/22 07:30





  Sevelamer Carbonate 800 Mg Tab  PO   2,400 mg





  AC LASHA   Administration


 


Sodium Chloride  10 ml  08/13/22 22:00  08/23/22 09:40





  Sodium Chloride 0.9% 10 Ml Flush Syringe  IV   10 ml





  BID LASHA   Administration


 


Sodium Chloride  10 ml  08/13/22 19:48 





  Sodium Chloride 0.9% 10 Ml Flush Syringe  IV  





  PRN PRN  





  LINE FLUSH  


 


Zolpidem Tartrate  5 mg  08/18/22 01:56  08/22/22 01:00





  Zolpidem 5 Mg Tab  PO   5 mg





  QHS PRN   Administration





  Sleep

## 2022-08-24 RX ADMIN — ONDANSETRON PRN MG: 2 INJECTION INTRAMUSCULAR; INTRAVENOUS at 22:15

## 2022-08-24 RX ADMIN — HEPARIN SODIUM SCH UNIT: 5000 INJECTION, SOLUTION INTRAVENOUS; SUBCUTANEOUS at 08:42

## 2022-08-24 RX ADMIN — SEVELAMER CARBONATE SCH: 800 TABLET, FILM COATED ORAL at 11:30

## 2022-08-24 RX ADMIN — INSULIN LISPRO SCH: 100 INJECTION, SOLUTION INTRAVENOUS; SUBCUTANEOUS at 12:25

## 2022-08-24 RX ADMIN — ASPIRIN SCH MG: 81 TABLET, COATED ORAL at 08:43

## 2022-08-24 RX ADMIN — INSULIN LISPRO SCH: 100 INJECTION, SOLUTION INTRAVENOUS; SUBCUTANEOUS at 08:31

## 2022-08-24 RX ADMIN — GUAIFENESIN PRN MG: 100 SOLUTION ORAL at 22:19

## 2022-08-24 RX ADMIN — Medication SCH ML: at 22:22

## 2022-08-24 RX ADMIN — METOPROLOL TARTRATE SCH: 100 TABLET, FILM COATED ORAL at 10:42

## 2022-08-24 RX ADMIN — SEVELAMER CARBONATE SCH: 800 TABLET, FILM COATED ORAL at 07:30

## 2022-08-24 RX ADMIN — METOPROLOL TARTRATE SCH MG: 100 TABLET, FILM COATED ORAL at 22:37

## 2022-08-24 RX ADMIN — HEPARIN SODIUM SCH UNIT: 5000 INJECTION, SOLUTION INTRAVENOUS; SUBCUTANEOUS at 22:23

## 2022-08-24 RX ADMIN — METOPROLOL TARTRATE SCH MG: 100 TABLET, FILM COATED ORAL at 08:43

## 2022-08-24 RX ADMIN — ASPIRIN SCH: 81 TABLET, COATED ORAL at 10:40

## 2022-08-24 RX ADMIN — INSULIN LISPRO SCH: 100 INJECTION, SOLUTION INTRAVENOUS; SUBCUTANEOUS at 22:36

## 2022-08-24 RX ADMIN — INSULIN LISPRO SCH UNIT: 100 INJECTION, SOLUTION INTRAVENOUS; SUBCUTANEOUS at 18:29

## 2022-08-24 RX ADMIN — HEPARIN SODIUM SCH: 5000 INJECTION, SOLUTION INTRAVENOUS; SUBCUTANEOUS at 10:41

## 2022-08-24 RX ADMIN — SEVELAMER CARBONATE SCH: 800 TABLET, FILM COATED ORAL at 16:44

## 2022-08-24 RX ADMIN — Medication SCH ML: at 10:30

## 2022-08-24 NOTE — PROGRESS NOTE
Assessment and Plan


1. ESRD:


Patient is on maintenance HD.


Last outpatient HD unknown.


Meds dosage based on GFR.


Hemodialysis: 8/13, 8/15, 8/17, 8/19, 8/22.


Patient refused HD today.





2. FEN:


Hyperkalemia, improved, on HD.


Hypernatremia, improved.


On Phos binders.


Monitor lytes and volume status.





3. Acute Metabolic Encephalopathy, POA:


Suspected Uremic encephalopathy.


Baseline cognitive decline.


Improving, monitor.





4. Seizure:


On Keppra.





5. Normocytic anemia, POA:


S/p PRBC.


Epogen with HD.


Monitor.





6. T2DM (type 2 diabetes mellitus):


SSI.











Subjective:


Patient was seen and examined at the bedside.











Examination:


General appearance: well-developed, emaciated, no distress, on restrains


HEENT: ATNC, pupils equal


Neck: trachea midline


Respiratory: Clear to Auscultation


Cardiology: regular, S1S2, no murmur


Gastrointestinal: soft, normoactive bowel sounds, not tender, ND


Integumentary: diffuse discrete papular rash, fading


Neurologic: alert, oriented to self, able to move extremities


Ext: no edema noted


Hemodialysis access: L arm AVF/AVG





Subjective


Date of service: 08/24/22





Objective





- Vital Signs


Vital signs: 


                               Vital Signs - 12hr











  08/24/22 08/24/22





  04:05 08:43


 


Temperature 98.0 F 


 


Pulse Rate 90 95 H


 


Respiratory 16 





Rate  


 


Blood Pressure 113/68 113/68


 


O2 Sat by Pulse 98 





Oximetry  














- Lab





                                 08/23/22 05:42





                                 08/23/22 05:42


                             Most recent lab results











Calcium  8.2 mg/dL (8.4-10.2)  L  08/23/22  05:42    


 


Phosphorus  8.30 mg/dL (2.5-4.5)  H  08/19/22  07:45    


 


Magnesium  2.20 mg/dL (1.7-2.3)   08/14/22  07:22    














Medications & Allergies





- Medications


Allergies/Adverse Reactions: 


                                    Allergies





No Known Allergies Allergy (Unverified 06/07/22 18:10)


   








Home Medications: 


                                Home Medications











 Medication  Instructions  Recorded  Confirmed  Last Taken  Type


 


Aspirin EC [Halfprin EC] 81 mg PO QDAY #30 tablet. 06/13/22 08/18/22 Unknown 

Rx


 


Metoprolol [Lopressor TAB] 100 mg PO BID #60 tablet 06/13/22 08/18/22 Unknown Rx


 


levETIRAcetam [Keppra TAB] 500 mg PO BID #60 tablet 06/13/22 08/18/22 Unknown Rx











Active Medications: 














Generic Name Dose Route Start Last Admin





  Trade Name Freq  PRN Reason Stop Dose Admin


 


Acetaminophen  650 mg  08/13/22 19:48 





  Acetaminophen 325 Mg Tab  PO  





  Q4H PRN  





  Pain MILD(1-3)/Fever >100.5/HA  


 


Aspirin  81 mg  08/14/22 10:00  08/24/22 08:43





  Aspirin Ec 81 Mg Tab  PO   81 mg





  QDAY LASHA   Administration


 


Dextrose  25 ml  08/20/22 22:03  08/20/22 23:21





  Dextrose 50% In Water (25gm) 50 Ml Syringe  IV   25 ml





  Q30MIN PRN   Administration





  Hypoglycemia  





  Protocol  


 


Epoetin Jesus-epbx  20,000 unit  08/16/22 21:57  08/22/22 12:28





  Epoetin Jesus-Epbx 10,000 Unit/1 Ml Vial  SUB-Q   20,000 unit





  EUSEBIA PRN   Administration





  hemodialysis  


 


Guaifenesin  200 mg  08/18/22 01:57  08/22/22 21:42





  Guaifenesin 100 Mg/5 Ml Oral Liqd  PO   200 mg





  Q4H PRN   Administration





  Cough  


 


Heparin Sodium (Porcine)  5,000 unit  08/13/22 22:00  08/24/22 08:42





  Heparin 5,000 Unit/1 Ml Vial  SUB-Q   5,000 unit





  Q12HR LASHA   Administration


 


Hydromorphone HCl  0.5 mg  08/13/22 19:48 





  Hydromorphone 0.5 Mg/0.5 Ml Inj  IV  





  Q3H PRN  





  Pain , Severe (7-10)  


 


Sodium Chloride  100 mls @ 999 mls/hr  08/19/22 08:23 





  Nacl 0.9%  IV  





  EUSEBIA PRN  





  Hypotension  


 


Insulin Human Lispro  0 unit  08/14/22 07:30  08/24/22 08:31





  Insulin Lispro 100 Unit/Ml  SUB-Q   Not Given





  [facility]  





  Protocol  


 


Levetiracetam  500 mg  08/14/22 10:00  08/24/22 08:43





  Levetiracetam 500 Mg Tab  PO   500 mg





  BID LASHA   Administration


 


Metoclopramide HCl  5 mg  08/16/22 09:00  08/22/22 01:51





  Metoclopramide 10 Mg/2 Ml Inj  IV   5 mg





  Q8H PRN   Administration





  Nausea And Vomiting  


 


Metoprolol Tartrate  100 mg  08/14/22 10:00  08/24/22 08:43





  Metoprolol Tartrate 100 Mg Tab  PO   100 mg





  BID LASHA   Administration


 


Morphine Sulfate  2 mg  08/13/22 19:48 





  Morphine 2 Mg/1 Ml Inj  IV  





  Q4H PRN  





  Pain, Moderate (4-6)  


 


Ondansetron HCl  4 mg  08/13/22 19:48  08/19/22 21:56





  Ondansetron 4 Mg/2 Ml Inj  IV   4 mg





  Q8H PRN   Administration





  Nausea And Vomiting  


 


Oxycodone/Acetaminophen  1 tab  08/13/22 19:48 





  Oxycodone /Acetaminophen 5-325mg Tab  PO  





  Q6H PRN  





  Pain, Moderate (4-6)  


 


Sevelamer Carbonate  2,400 mg  08/14/22 12:30  08/24/22 07:30





  Sevelamer Carbonate 800 Mg Tab  PO   Not Given





  AC LASHA  


 


Sodium Chloride  10 ml  08/13/22 22:00  08/23/22 21:27





  Sodium Chloride 0.9% 10 Ml Flush Syringe  IV   10 ml





  BID LASHA   Administration


 


Sodium Chloride  10 ml  08/13/22 19:48 





  Sodium Chloride 0.9% 10 Ml Flush Syringe  IV  





  PRN PRN  





  LINE FLUSH  


 


Zolpidem Tartrate  5 mg  08/18/22 01:56  08/22/22 01:00





  Zolpidem 5 Mg Tab  PO   5 mg





  QHS PRN   Administration





  Sleep

## 2022-08-24 NOTE — PROGRESS NOTE
Assessment and Plan


Assessment and plan: 


51-year-old  seizure disorder hypertension type 2 diabetes and end-stage 

renal disease on hemodialysis presents with lethargy and confusion.  Family 

brought him to the emergency room stating that he is confused not oriented.  His

last known dialysis is not known.  Improvement on fluids.  No fever or chills.


No seizures.  Noncompliant with his medications.





Assessment and Plan:


(1) Acute metabolic encephalopathy (resolved)


Secondary to severe uremia.  His creatinine is 32.1 and BUN is 242.  


Family does not know how many days he has missed his hemodialysis.  


Patient to be counseled about compliance once he  is more alert and oriented.





(2) Hyperkalemia (improved)


Treated in the emergency room and patient going for hemodialysis


Correct with HD,





(3) Metabolic acidosis (resolved)


Secondary to severe uremia.  ESRD


Improved after dialysis





(4) Seizure disorder


Continue Keppra.,  Seizure precautions


Supportive care





(5) T2DM (type 2 diabetes mellitus)


Coverage for now, [employer] sliding scale coverage


ADA diet





(6) ESRD needing dialysis


Continue hemodialysis


Nephrology following


Patient to be counseled about compliance





(7) Anemia of chronic kidney disease


Secondary to chronic kidney disease


Epogen as per nephrology


1 unit prbc given on 8/14.


Hgb and VSS currently stable.


Closely monitor H&H transfuse additional PRBC as needed





(8) Hypertension


Continue antihypertensives





(9) severe protein calorie malnutrition


Dietary supplements initiated


Dietitian consult requested





hospital course:


8/14: AOx4 on bedside encounter. Transfused 1 unit due to hgb 6.8. Patient 

stated he has now missed several dialysis sessions. He goes to [facility] 

[place] per patient. will place CM consultation to ensure patient still has 

chair. Anticipate d/c tomorrow.





8/15: Hgb improved to 7.7. Will work with CM regarding dialysis chair as patient

prior chair due to noncompliance PTA.





8/16: Await case management decision regarding outpatient hemodialysis





8/17: Physical therapy recommends SNF placement.  We will discuss with case 

management disposition.  Continue hemodialysis per nephrology recommendations.





8/18: Awaiting for arrangements for hemodialysis associated with skilled nursing

facility or outpatient hemodialysis chair.  I discussed the case with case 

management





8/19:   Awaiting for arrangements for hemodialysis associated with skilled 

nursing facility or outpatient hemodialysis chair.  Patient is less confused and

appears to be back to baseline.





8/20:  Awaiting for arrangements for hemodialysis associated with skilled 

nursing facility or outpatient hemodialysis chair.  Patient appears to be 

confused this morning.  Continue restraints for safety





8/21:  Awaiting for arrangements for hemodialysis associated with skilled 

nursing facility or outpatient hemodialysis chair.  Patient still with 

metabolic/uremic encephalopathy.  Patient has had baseline cognitive decline.  

Continue restraints for safety





8/22: Patient remains confused and likely has had baseline cognitive decline.   

Awaiting for arrangements for hemodialysis associated with skilled nursing 

facility or outpatient hemodialysis chair.  Continue restraints for safety.





8/23; awaiting SNF/HD facility placement, DC planning per case management


8/24/2022; pending placement





Spoke with patient's daughter Ms. Brandon extensively when she visited her father,

patient's condition, tests and reports, consultants recommendations


Need for hemodialysis, she had numerous questions I answered all of them, she 

wanted to talk to nephrologist, directed her to the nurse to connect her to the 

nephrologist.  She verbalized understanding and was appreciative














History


Interval history: 


I have seen and examined the patient at the bedside


Patient's chart and medications reviewed


No new events reported by the nursing


Patient is confused at times requiring restraints


Vital signs noted








Hospitalist Physical





- Constitutional


Vitals: 


                                        











Temp Pulse Resp BP Pulse Ox


 


 97.8 F   88   14   128/67   100 


 


 08/24/22 17:16  08/24/22 17:16  08/24/22 17:16  08/24/22 17:16  08/24/22 17:16











General appearance: Present: no acute distress, well-nourished, other (Confused)





- EENT


Eyes: Present: PERRL, EOM intact





- Neck


Neck: Present: supple, normal ROM





- Respiratory


Respiratory effort: normal


Respiratory: bilateral: diminished, negative: rales, rhonchi, wheezing





- Cardiovascular


Rhythm: regular


Heart Sounds: Present: S1 & S2





- Extremities


Extremities: no ischemia, No edema





- Abdominal


General gastrointestinal: soft, non-tender, non-distended





- Integumentary


Integumentary: Present: clear, warm





- Psychiatric


Psychiatric: agitated, other (Confused)





- Neurologic


Neurologic: moves all extremities (Confused)





HEART Score





- HEART Score


Troponin: 


                                        











Troponin T  0.933 ng/mL (0.00-0.029)  H*  08/13/22  01:28    














Results





- Labs


CBC & Chem 7: 


                                 08/23/22 05:42





                                 08/23/22 05:42


Labs: 


                             Laboratory Last Values











WBC  9.1 K/mm3 (4.5-11.0)   08/23/22  05:42    


 


RBC  2.44 M/mm3 (3.65-5.03)  L  08/23/22  05:42    


 


Hgb  7.5 gm/dl (11.8-15.2)  L  08/23/22  05:42    


 


Hct  21.8 % (35.5-45.6)  L  08/23/22  05:42    


 


MCV  89 fl (84-94)   08/23/22  05:42    


 


MCH  31 pg (28-32)   08/23/22  05:42    


 


MCHC  35 % (32-34)  H  08/23/22  05:42    


 


RDW  16.0 % (13.2-15.2)  H  08/23/22  05:42    


 


Plt Count  228 K/mm3 (140-440)   08/23/22  05:42    


 


Lymph % (Auto)  11.1 % (13.4-35.0)  L  08/23/22  05:42    


 


Mono % (Auto)  15.5 % (0.0-7.3)  H  08/23/22  05:42    


 


Eos % (Auto)  0.4 % (0.0-4.3)   08/23/22  05:42    


 


Baso % (Auto)  0.7 % (0.0-1.8)   08/23/22  05:42    


 


Lymph # (Auto)  1.0 K/mm3 (1.2-5.4)  L  08/23/22  05:42    


 


Mono # (Auto)  1.4 K/mm3 (0.0-0.8)  H  08/23/22  05:42    


 


Eos # (Auto)  0.0 K/mm3 (0.0-0.4)   08/23/22  05:42    


 


Baso # (Auto)  0.1 K/mm3 (0.0-0.1)   08/23/22  05:42    


 


Seg Neutrophils %  72.3 % (40.0-70.0)  H  08/23/22  05:42    


 


Seg Neutrophils #  6.6 K/mm3 (1.8-7.7)   08/23/22  05:42    


 


PT  17.0 Sec. (12.2-14.9)  H  08/13/22  01:28    


 


INR  1.23  (0.87-1.13)  H  08/13/22  01:28    


 


Sodium  140 mmol/L (137-145)   08/23/22  05:42    


 


Potassium  4.1 mmol/L (3.6-5.0)   08/23/22  05:42    


 


Chloride  97.0 mmol/L ()  L  08/23/22  05:42    


 


Carbon Dioxide  31 mmol/L (22-30)  H  08/23/22  05:42    


 


Anion Gap  16 mmol/L  08/23/22  05:42    


 


BUN  39 mg/dL (9-20)  H  08/23/22  05:42    


 


Creatinine  5.9 mg/dL (0.8-1.3)  H  08/23/22  05:42    


 


Estimated GFR  12 ml/min  08/23/22  05:42    


 


BUN/Creatinine Ratio  7 %  08/23/22  05:42    


 


Glucose  80 mg/dL ()   08/23/22  05:42    


 


POC Glucose  106 mg/dL ()  H  08/24/22  12:21    


 


Calcium  8.2 mg/dL (8.4-10.2)  L  08/23/22  05:42    


 


Phosphorus  8.30 mg/dL (2.5-4.5)  H  08/19/22  07:45    


 


Magnesium  2.20 mg/dL (1.7-2.3)   08/14/22  07:22    


 


Total Bilirubin  0.30 mg/dL (0.1-1.2)   08/15/22  04:23    


 


AST  16 units/L (5-40)   08/15/22  04:23    


 


ALT  16 units/L (7-56)   08/15/22  04:23    


 


Alkaline Phosphatase  60 units/L ()   08/15/22  04:23    


 


Ammonia  39.0 umol/L (25-60)   08/22/22  05:26    


 


Total Creatine Kinase  1147 units/L ()  H  08/13/22  01:28    


 


Troponin T  0.933 ng/mL (0.00-0.029)  H*  08/13/22  01:28    


 


Total Protein  6.3 g/dL (6.3-8.2)   08/15/22  04:23    


 


Albumin  2.4 g/dL (3.9-5)  L  08/15/22  04:23    


 


Albumin/Globulin Ratio  0.6 %  08/15/22  04:23    


 


Triglycerides  178 mg/dL (2-149)  H  08/13/22  01:28    


 


Cholesterol  112 mg/dL ()   08/13/22  01:28    


 


LDL Cholesterol Direct  30 mg/dL ()  L  08/13/22  01:28    


 


HDL Cholesterol  39 mg/dL (40-59)  L  08/13/22  01:28    


 


Cholesterol/HDL Ratio  2.87 %  08/13/22  01:28    


 


Salicylates  < 0.3 mg/dL (2.8-20.0)  L  08/13/22  01:28    


 


Acetaminophen  5.0 ug/mL (10.0-30.0)  L  08/13/22  01:28    


 


Plasma/Serum Alcohol  < 0.01 % (0-0.07)   08/13/22  01:28    


 


Coronavirus (PCR)  Negative  (Negative)   08/14/22  Unknown


 


SARS-CoV-2 (PCR)  Negative  (Negative)   08/15/22  11:05    


 


Hepatitis A IgM Ab  Non-reactive  (NonReactive)   08/13/22  01:28    


 


Hep Bs Antigen  Non-reactive  (Negative)   08/13/22  01:28    


 


Hep B Core IgM Ab  Non-reactive  (NonReactive)   08/13/22  01:28    


 


Hepatitis C Antibody  Non-reactive  (NonReactive)   08/13/22  01:28    


 


Blood Type  A POSITIVE   08/14/22  10:00    


 


Antibody Screen  Negative   08/14/22  10:00    


 


Crossmatch  See Detail   08/14/22  10:00    











Petty/IV: 


                                        





Voiding Method                   Condom Catheter











Active Medications





- Current Medications


Current Medications: 














Generic Name Dose Route Start Last Admin





  Trade Name Freq  PRN Reason Stop Dose Admin


 


Acetaminophen  650 mg  08/13/22 19:48 





  Acetaminophen 325 Mg Tab  PO  





  Q4H PRN  





  Pain MILD(1-3)/Fever >100.5/HA  


 


Aspirin  81 mg  08/14/22 10:00  08/24/22 10:40





  Aspirin Ec 81 Mg Tab  PO   Not Given





  QDAY LASHA  


 


Dextrose  25 ml  08/20/22 22:03  08/20/22 23:21





  Dextrose 50% In Water (25gm) 50 Ml Syringe  IV   25 ml





  Q30MIN PRN   Administration





  Hypoglycemia  





  Protocol  


 


Epoetin Jesus-epbx  20,000 unit  08/16/22 21:57  08/22/22 12:28





  Epoetin Jesus-Epbx 10,000 Unit/1 Ml Vial  SUB-Q   20,000 unit





  EUSEBIA PRN   Administration





  hemodialysis  


 


Guaifenesin  200 mg  08/18/22 01:57  08/22/22 21:42





  Guaifenesin 100 Mg/5 Ml Oral Liqd  PO   200 mg





  Q4H PRN   Administration





  Cough  


 


Heparin Sodium (Porcine)  5,000 unit  08/13/22 22:00  08/24/22 10:41





  Heparin 5,000 Unit/1 Ml Vial  SUB-Q   Not Given





  Q12HR LASHA  


 


Hydromorphone HCl  0.5 mg  08/13/22 19:48 





  Hydromorphone 0.5 Mg/0.5 Ml Inj  IV  





  Q3H PRN  





  Pain , Severe (7-10)  


 


Sodium Chloride  100 mls @ 999 mls/hr  08/19/22 08:23 





  Nacl 0.9%  IV  





  EUSEBIA PRN  





  Hypotension  


 


Insulin Human Lispro  0 unit  08/14/22 07:30  08/24/22 18:29





  Insulin Lispro 100 Unit/Ml  SUB-Q   2 unit





  [facility]   Administration





  Protocol  


 


Levetiracetam  500 mg  08/14/22 10:00  08/24/22 10:42





  Levetiracetam 500 Mg Tab  PO   Not Given





  [facility]  


 


Metoclopramide HCl  5 mg  08/16/22 09:00  08/22/22 01:51





  Metoclopramide 10 Mg/2 Ml Inj  IV   5 mg





  Q8H PRN   Administration





  Nausea And Vomiting  


 


Metoprolol Tartrate  100 mg  08/14/22 10:00  08/24/22 10:42





  Metoprolol Tartrate 100 Mg Tab  PO   Not Given





  [facility]  


 


Morphine Sulfate  2 mg  08/13/22 19:48 





  Morphine 2 Mg/1 Ml Inj  IV  





  Q4H PRN  





  Pain, Moderate (4-6)  


 


Ondansetron HCl  4 mg  08/13/22 19:48  08/19/22 21:56





  Ondansetron 4 Mg/2 Ml Inj  IV   4 mg





  Q8H PRN   Administration





  Nausea And Vomiting  


 


Oxycodone/Acetaminophen  1 tab  08/13/22 19:48 





  Oxycodone /Acetaminophen 5-325mg Tab  PO  





  Q6H PRN  





  Pain, Moderate (4-6)  


 


Sevelamer Carbonate  2,400 mg  08/14/22 12:30  08/24/22 16:44





  Sevelamer Carbonate 800 Mg Tab  PO   Not Given





  AC LASHA  


 


Sodium Chloride  10 ml  08/13/22 22:00  08/24/22 10:30





  Sodium Chloride 0.9% 10 Ml Flush Syringe  IV   10 ml





  BID LASHA   Administration


 


Sodium Chloride  10 ml  08/13/22 19:48 





  Sodium Chloride 0.9% 10 Ml Flush Syringe  IV  





  PRN PRN  





  LINE FLUSH  


 


Zolpidem Tartrate  5 mg  08/18/22 01:56  08/22/22 01:00





  Zolpidem 5 Mg Tab  PO   5 mg





  QHS PRN   Administration





  Sleep  














Nutrition/Malnutrition Assess





- Dietary Evaluation


Nutrition/Malnutrition Findings: 


                                        





Nutrition Notes                                            Start:  08/16/22 

15:24


Freq:                                                      Status: Active       




Protocol:                                                                       




 Document     08/23/22 12:00  GLORIA  (Rec: 08/23/22 12:18  GLORIA  [id number])


 Nutrition Notes


     Initial or Follow up                        Reassessment


     Current Diagnosis                           CKD (stage V CKD),Diabetes,


                                                 Hypertension,Malnutrition


     Other Pertinent Diagnosis                   ESRD+HD, Seizure Disorder,


                                                 Anemia, s/p Metabolic


                                                 Encephalopathy.


     Current Diet                                Renal Diet (since D 08/13), D


                                                 Suppl (from D 08/16).


     Labs/Tests                                  08/23: Cl 97.0, CO2 31, BUN 39


                                                 , Crea 5.9, Ca 8.2.


     Pertinent Medications                       08/23: Renvela, others


                                                 nutritionally unremarkable.


     Height                                      5 ft 9 in


     Weight                                      54.43 kg


     Ideal Body Weight (kg)                      72.72


     BMI                                         17.7


     Weight change and time frame                No body weight change reported


                                                 in 1 week.


     Weight Status                               Underweight


     Subjective/Other Information                RD consult for routine F/U on


                                                 Dietary Advancement.


                                                 Diet continues as prescribed,


                                                 Pt's PO intake of meals has


                                                 been Good (100%) and well


                                                 tolerated, according to ADL


                                                 notes.


                                                 Pt is on Room Air, O2


                                                 saturation @ 98%, according to


                                                 Physical Assessment History


                                                 notes.


                                                 Pt presents incontinence,


                                                 according to Physical


                                                 Assessment History notes.


                                                 Pt is awaiting SNF placement


                                                 and HD chair; Pt is on


                                                 restrains for safety,


                                                 according to Progress notes.


     Percent of energy/protein needs met:        Prescribed Renal Diet provides


                                                 for energy/protein needs (2,


                                                 072 Kcal/77 g) during LOS;


                                                 additionally, Dietary


                                                 Supplements will compensate


                                                 for possible poor or


                                                 insufficient PO intake of


                                                 meals with 1,275 Kcal and 57 g


                                                 of protein.


     Burn                                        Absent


     Trauma                                      Absent


     GI Symptoms                                 Other


     Food Allergy                                No


     Skin Integrity/Comment                      Assessment WNL.


     Current % PO                                Good (%)


     Minimum of two criteria                     No


     Fluid Accumulation                          N/A


     Reduced  Strength                       N/A (non-severe)


     Protein-Calorie Malnutrition                N\A


     #1


      Nutrition Diagnosis                        Inadequate protein-energy


                                                 intake


      Comments:                                  Diet continues as prescribed,


                                                 Pt's PO intake of meals has


                                                 been Good (100%) and well


                                                 tolerated, according to ADL


                                                 notes.


                                                 No body weight change reported


                                                 in 1 week.


      Diagnosis Progress(for reassessment        Improved


       documentation)                            


     Is patient on ventilator?                   No


     Is Patient Ambulatory and/or Out of Bed     No


     REE-([facility]-confined to bed)      [phone]


     Kcal/Kg value to use for calculation        36


     Approximate Energy Requirements Using       1959


      kcal/Kg                                    


     Calculation Used for Recommendations        Kcal/kg


     Additional Notes                            Protein: >1.2 g/Kg ABW; >65 g/


                                                 day.


                                                 Fluids: 1 ml/Kcal, or as per


                                                 MD.


 Nutrition Intervention


     Change Diet Order:                          Continue Renal Diet as


                                                 tolerated.


     Add Supplement/Snack (indicate name/kcal    Continue Nepro w/CARBSTEADY;


      /protein )                                 TID.


     Provides kCal:                              1,275


     Provides Protein (gm)                       57


     Goal #1                                     Compensate, through dietary


                                                 supplementation, for possible


                                                 poor or insufficient PO intake


                                                 of meals during LOS.


     Goal #2                                     Adjust the dietary


                                                 intervention to better serve


                                                 Pt's needs and clinical


                                                 conditions during LOS.


     Follow-Up By:                               08/30/22


     Additional Comments                         Continue monitoring food


                                                 tolerance, %PO intake of meals


                                                 , dietary supplements, and BM.

## 2022-08-25 RX ADMIN — INSULIN LISPRO SCH: 100 INJECTION, SOLUTION INTRAVENOUS; SUBCUTANEOUS at 22:23

## 2022-08-25 RX ADMIN — SEVELAMER CARBONATE SCH: 800 TABLET, FILM COATED ORAL at 18:13

## 2022-08-25 RX ADMIN — Medication SCH: at 09:43

## 2022-08-25 RX ADMIN — SEVELAMER CARBONATE SCH: 800 TABLET, FILM COATED ORAL at 11:30

## 2022-08-25 RX ADMIN — HEPARIN SODIUM SCH UNIT: 5000 INJECTION, SOLUTION INTRAVENOUS; SUBCUTANEOUS at 09:43

## 2022-08-25 RX ADMIN — METOPROLOL TARTRATE SCH: 100 TABLET, FILM COATED ORAL at 09:53

## 2022-08-25 RX ADMIN — Medication SCH ML: at 22:11

## 2022-08-25 RX ADMIN — SEVELAMER CARBONATE SCH MG: 800 TABLET, FILM COATED ORAL at 09:43

## 2022-08-25 RX ADMIN — INSULIN LISPRO SCH: 100 INJECTION, SOLUTION INTRAVENOUS; SUBCUTANEOUS at 12:01

## 2022-08-25 RX ADMIN — INSULIN LISPRO SCH: 100 INJECTION, SOLUTION INTRAVENOUS; SUBCUTANEOUS at 09:53

## 2022-08-25 RX ADMIN — ASPIRIN SCH MG: 81 TABLET, COATED ORAL at 09:43

## 2022-08-25 RX ADMIN — HEPARIN SODIUM SCH UNIT: 5000 INJECTION, SOLUTION INTRAVENOUS; SUBCUTANEOUS at 22:10

## 2022-08-25 RX ADMIN — INSULIN LISPRO SCH: 100 INJECTION, SOLUTION INTRAVENOUS; SUBCUTANEOUS at 18:08

## 2022-08-25 RX ADMIN — METOPROLOL TARTRATE SCH MG: 100 TABLET, FILM COATED ORAL at 22:09

## 2022-08-25 NOTE — EVENT NOTE
Date: 08/25/22


I discussed with patient's daughter Ms. Brandon in detail [when she visited her 

father] the patient's condition, tests and reports, consultants recommendations


Need for hemodialysis, she had numerous questions I answered all of them, she 

wanted to talk to nephrologist, directed her to the nurse to connect her to the 

nephrologist.  She verbalized understanding and was appreciative.

## 2022-08-25 NOTE — PROGRESS NOTE
Assessment and Plan


Assessment and plan: 


51-year-old  seizure disorder hypertension type 2 diabetes and end-stage 

renal disease on hemodialysis presents with lethargy and confusion.  Family 

brought him to the emergency room stating that he is confused not oriented.  His

last known dialysis is not known.  Improvement on fluids.  No fever or chills.


No seizures.  Noncompliant with his medications.





Assessment and Plan:


(1) Acute metabolic encephalopathy (resolved)


Secondary to severe uremia.  His creatinine is 32.1 and BUN is 242.  


Family does not know how many days he has missed his hemodialysis.  


Patient to be counseled about compliance once he  is more alert and oriented.





(2) Hyperkalemia (improved)


Treated in the emergency room and patient going for hemodialysis


Correct with HD,





(3) Metabolic acidosis (resolved)


Secondary to severe uremia.  ESRD


Improved after dialysis





(4) Seizure disorder


Continue Keppra.,  Seizure precautions


Supportive care





(5) T2DM (type 2 diabetes mellitus)


Coverage for now, [employer] sliding scale coverage


ADA diet





(6) ESRD needing dialysis


Continue hemodialysis


Nephrology following


Patient to be counseled about compliance





(7) Anemia of chronic kidney disease


Secondary to chronic kidney disease


Epogen as per nephrology


1 unit prbc given on 8/14.


Hgb and VSS currently stable.


Closely monitor H&H transfuse additional PRBC as needed





(8) Hypertension


Continue antihypertensives





(9) severe protein calorie malnutrition


Dietary supplements initiated


Dietitian consult requested





hospital course:


8/14: AOx4 on bedside encounter. Transfused 1 unit due to hgb 6.8. Patient 

stated he has now missed several dialysis sessions. He goes to [facility] 

[place] patient. will place CM consultation to ensure patient still has 

chair. Anticipate d/c tomorrow.





8/15: Hgb improved to 7.7. Will work with CM regarding dialysis chair as patient

prior chair due to noncompliance PTA.





8/16: Await case management decision regarding outpatient hemodialysis





8/17: Physical therapy recommends SNF placement.  We will discuss with case 

management disposition.  Continue hemodialysis per nephrology recommendations.





8/18: Awaiting for arrangements for hemodialysis associated with skilled nursing

facility or outpatient hemodialysis chair.  I discussed the case with case 

management





8/19:   Awaiting for arrangements for hemodialysis associated with skilled 

nursing facility or outpatient hemodialysis chair.  Patient is less confused and

appears to be back to baseline.





8/20:  Awaiting for arrangements for hemodialysis associated with skilled 

nursing facility or outpatient hemodialysis chair.  Patient appears to be 

confused this morning.  Continue restraints for safety





8/21:  Awaiting for arrangements for hemodialysis associated with skilled 

nursing facility or outpatient hemodialysis chair.  Patient still with 

metabolic/uremic encephalopathy.  Patient has had baseline cognitive decline.  

Continue restraints for safety





8/22: Patient remains confused and likely has had baseline cognitive decline.   

Awaiting for arrangements for hemodialysis associated with skilled nursing 

facility or outpatient hemodialysis chair.  Continue restraints for safety.





8/23; awaiting SNF/HD facility placement, DC planning per case management


8/24/2022; pending placement





Spoke with patient's daughter Ms. Brandon extensively when she visited her father,

patient's condition, tests and reports, consultants recommendations


Need for hemodialysis, she had numerous questions I answered all of them, she 

wanted to talk to nephrologist, directed her to the nurse to connect her to the 

nephrologist.  She verbalized understanding and was appreciative








History


Interval history: 


Have seen and examined the patient at the bedside, patient's chart and 

medications reviewed


Patient is refusing dialysis, patient's family is aware


No new events reported per nursing staff


Vital signs noted








Hospitalist Physical





- Constitutional


Vitals: 


                                        











Temp Pulse Resp BP Pulse Ox


 


 97.3 F L  79   18   124/63   96 


 


 08/25/22 06:40  08/25/22 06:40  08/25/22 06:40  08/25/22 06:40  08/25/22 08:38











General appearance: Present: no acute distress, well-nourished, other (Confused)





- EENT


Eyes: Present: PERRL, EOM intact





- Neck


Neck: Present: supple, normal ROM





- Respiratory


Respiratory effort: normal


Respiratory: bilateral: diminished, negative: rales, rhonchi, wheezing





- Cardiovascular


Rhythm: regular


Heart Sounds: Present: S1 & S2





- Extremities


Extremities: no ischemia, No edema





- Abdominal


General gastrointestinal: soft, non-tender, non-distended, normal bowel sounds





- Integumentary


Integumentary: Present: clear, warm





- Psychiatric


Psychiatric: appropriate mood/affect, cooperative





- Neurologic


Neurologic: moves all extremities





HEART Score





- HEART Score


Troponin: 


                                        











Troponin T  0.933 ng/mL (0.00-0.029)  H*  08/13/22  01:28    














Results





- Labs


CBC & Chem 7: 


                                 08/23/22 05:42





                                 08/26/22 04:00


Labs: 


                             Laboratory Last Values











WBC  9.1 K/mm3 (4.5-11.0)   08/23/22  05:42    


 


RBC  2.44 M/mm3 (3.65-5.03)  L  08/23/22  05:42    


 


Hgb  7.5 gm/dl (11.8-15.2)  L  08/23/22  05:42    


 


Hct  21.8 % (35.5-45.6)  L  08/23/22  05:42    


 


MCV  89 fl (84-94)   08/23/22  05:42    


 


MCH  31 pg (28-32)   08/23/22  05:42    


 


MCHC  35 % (32-34)  H  08/23/22  05:42    


 


RDW  16.0 % (13.2-15.2)  H  08/23/22  05:42    


 


Plt Count  228 K/mm3 (140-440)   08/23/22  05:42    


 


Lymph % (Auto)  11.1 % (13.4-35.0)  L  08/23/22  05:42    


 


Mono % (Auto)  15.5 % (0.0-7.3)  H  08/23/22  05:42    


 


Eos % (Auto)  0.4 % (0.0-4.3)   08/23/22  05:42    


 


Baso % (Auto)  0.7 % (0.0-1.8)   08/23/22  05:42    


 


Lymph # (Auto)  1.0 K/mm3 (1.2-5.4)  L  08/23/22  05:42    


 


Mono # (Auto)  1.4 K/mm3 (0.0-0.8)  H  08/23/22  05:42    


 


Eos # (Auto)  0.0 K/mm3 (0.0-0.4)   08/23/22  05:42    


 


Baso # (Auto)  0.1 K/mm3 (0.0-0.1)   08/23/22  05:42    


 


Seg Neutrophils %  72.3 % (40.0-70.0)  H  08/23/22  05:42    


 


Seg Neutrophils #  6.6 K/mm3 (1.8-7.7)   08/23/22  05:42    


 


PT  17.0 Sec. (12.2-14.9)  H  08/13/22  01:28    


 


INR  1.23  (0.87-1.13)  H  08/13/22  01:28    


 


Sodium  140 mmol/L (137-145)   08/23/22  05:42    


 


Potassium  4.1 mmol/L (3.6-5.0)   08/23/22  05:42    


 


Chloride  97.0 mmol/L ()  L  08/23/22  05:42    


 


Carbon Dioxide  31 mmol/L (22-30)  H  08/23/22  05:42    


 


Anion Gap  16 mmol/L  08/23/22  05:42    


 


BUN  39 mg/dL (9-20)  H  08/23/22  05:42    


 


Creatinine  5.9 mg/dL (0.8-1.3)  H  08/23/22  05:42    


 


Estimated GFR  12 ml/min  08/23/22  05:42    


 


BUN/Creatinine Ratio  7 %  08/23/22  05:42    


 


Glucose  80 mg/dL ()   08/23/22  05:42    


 


POC Glucose  63 mg/dL ()  L  08/24/22  22:18    


 


Calcium  8.2 mg/dL (8.4-10.2)  L  08/23/22  05:42    


 


Phosphorus  8.30 mg/dL (2.5-4.5)  H  08/19/22  07:45    


 


Magnesium  2.20 mg/dL (1.7-2.3)   08/14/22  07:22    


 


Total Bilirubin  0.30 mg/dL (0.1-1.2)   08/15/22  04:23    


 


AST  16 units/L (5-40)   08/15/22  04:23    


 


ALT  16 units/L (7-56)   08/15/22  04:23    


 


Alkaline Phosphatase  60 units/L ()   08/15/22  04:23    


 


Ammonia  39.0 umol/L (25-60)   08/22/22  05:26    


 


Total Creatine Kinase  1147 units/L ()  H  08/13/22  01:28    


 


Troponin T  0.933 ng/mL (0.00-0.029)  H*  08/13/22  01:28    


 


Total Protein  6.3 g/dL (6.3-8.2)   08/15/22  04:23    


 


Albumin  2.4 g/dL (3.9-5)  L  08/15/22  04:23    


 


Albumin/Globulin Ratio  0.6 %  08/15/22  04:23    


 


Triglycerides  178 mg/dL (2-149)  H  08/13/22  01:28    


 


Cholesterol  112 mg/dL ()   08/13/22  01:28    


 


LDL Cholesterol Direct  30 mg/dL ()  L  08/13/22  01:28    


 


HDL Cholesterol  39 mg/dL (40-59)  L  08/13/22  01:28    


 


Cholesterol/HDL Ratio  2.87 %  08/13/22  01:28    


 


Salicylates  < 0.3 mg/dL (2.8-20.0)  L  08/13/22  01:28    


 


Acetaminophen  5.0 ug/mL (10.0-30.0)  L  08/13/22  01:28    


 


Plasma/Serum Alcohol  < 0.01 % (0-0.07)   08/13/22  01:28    


 


Coronavirus (PCR)  Negative  (Negative)   08/14/22  Unknown


 


SARS-CoV-2 (PCR)  Negative  (Negative)   08/15/22  11:05    


 


Hepatitis A IgM Ab  Non-reactive  (NonReactive)   08/13/22  01:28    


 


Hep Bs Antigen  Non-reactive  (Negative)   08/13/22  01:28    


 


Hep B Core IgM Ab  Non-reactive  (NonReactive)   08/13/22  01:28    


 


Hepatitis C Antibody  Non-reactive  (NonReactive)   08/13/22  01:28    


 


Blood Type  A POSITIVE   08/14/22  10:00    


 


Antibody Screen  Negative   08/14/22  10:00    


 


Crossmatch  See Detail   08/14/22  10:00    











Petty/IV: 


                                        





Voiding Method                   Condom Catheter











Active Medications





- Current Medications


Current Medications: 














Generic Name Dose Route Start Last Admin





  Trade Name Freq  PRN Reason Stop Dose Admin


 


Acetaminophen  650 mg  08/13/22 19:48 





  Acetaminophen 325 Mg Tab  PO  





  Q4H PRN  





  Pain MILD(1-3)/Fever >100.5/HA  


 


Aspirin  81 mg  08/14/22 10:00  08/24/22 10:40





  Aspirin Ec 81 Mg Tab  PO   Not Given





  QDAY LASHA  


 


Dextrose  25 ml  08/20/22 22:03  08/20/22 23:21





  Dextrose 50% In Water (25gm) 50 Ml Syringe  IV   25 ml





  Q30MIN PRN   Administration





  Hypoglycemia  





  Protocol  


 


Epoetin Jesus-epbx  20,000 unit  08/16/22 21:57  08/22/22 12:28





  Epoetin Jesus-Epbx 10,000 Unit/1 Ml Vial  SUB-Q   20,000 unit





  EUSEBIA PRN   Administration





  hemodialysis  


 


Guaifenesin  200 mg  08/18/22 01:57  08/24/22 22:19





  Guaifenesin 100 Mg/5 Ml Oral Liqd  PO   200 mg





  Q4H PRN   Administration





  Cough  


 


Heparin Sodium (Porcine)  5,000 unit  08/13/22 22:00  08/24/22 22:23





  Heparin 5,000 Unit/1 Ml Vial  SUB-Q   5,000 unit





  Q12HR LASHA   Administration


 


Hydromorphone HCl  0.5 mg  08/13/22 19:48 





  Hydromorphone 0.5 Mg/0.5 Ml Inj  IV  





  Q3H PRN  





  Pain , Severe (7-10)  


 


Sodium Chloride  100 mls @ 999 mls/hr  08/19/22 08:23 





  Nacl 0.9%  IV  





  EUSEBIA PRN  





  Hypotension  


 


Insulin Human Lispro  0 unit  08/14/22 07:30  08/24/22 22:36





  Insulin Lispro 100 Unit/Ml  SUB-Q   Not Given





  [facility]  





  Protocol  


 


Levetiracetam  500 mg  08/14/22 10:00  08/24/22 22:23





  Levetiracetam 500 Mg Tab  PO   500 mg





  BID LASHA   Administration


 


Metoclopramide HCl  5 mg  08/16/22 09:00  08/22/22 01:51





  Metoclopramide 10 Mg/2 Ml Inj  IV   5 mg





  Q8H PRN   Administration





  Nausea And Vomiting  


 


Metoprolol Tartrate  100 mg  08/14/22 10:00  08/24/22 22:37





  Metoprolol Tartrate 100 Mg Tab  PO   100 mg





  BID LASHA   Administration


 


Morphine Sulfate  2 mg  08/13/22 19:48 





  Morphine 2 Mg/1 Ml Inj  IV  





  Q4H PRN  





  Pain, Moderate (4-6)  


 


Ondansetron HCl  4 mg  08/13/22 19:48  08/24/22 22:15





  Ondansetron 4 Mg/2 Ml Inj  IV   4 mg





  Q8H PRN   Administration





  Nausea And Vomiting  


 


Oxycodone/Acetaminophen  1 tab  08/13/22 19:48 





  Oxycodone /Acetaminophen 5-325mg Tab  PO  





  Q6H PRN  





  Pain, Moderate (4-6)  


 


Sevelamer Carbonate  2,400 mg  08/14/22 12:30  08/24/22 16:44





  Sevelamer Carbonate 800 Mg Tab  PO   Not Given





  AC LASHA  


 


Sodium Chloride  10 ml  08/13/22 22:00  08/24/22 22:22





  Sodium Chloride 0.9% 10 Ml Flush Syringe  IV   10 ml





  BID LASHA   Administration


 


Sodium Chloride  10 ml  08/13/22 19:48 





  Sodium Chloride 0.9% 10 Ml Flush Syringe  IV  





  PRN PRN  





  LINE FLUSH  


 


Zolpidem Tartrate  5 mg  08/18/22 01:56  08/22/22 01:00





  Zolpidem 5 Mg Tab  PO   5 mg





  QHS PRN   Administration





  Sleep  














Nutrition/Malnutrition Assess





- Dietary Evaluation


Nutrition/Malnutrition Findings: 


                                        





Nutrition Notes                                            Start:  08/16/22 

15:24


Freq:                                                      Status: Active       




Protocol:                                                                       




 Document     08/23/22 12:00  GLORIA  (Rec: 08/23/22 12:18  GLORIA  [id number])


 Nutrition Notes


     Initial or Follow up                        Reassessment


     Current Diagnosis                           CKD (stage V CKD),Diabetes,


                                                 Hypertension,Malnutrition


     Other Pertinent Diagnosis                   ESRD+HD, Seizure Disorder,


                                                 Anemia, s/p Metabolic


                                                 Encephalopathy.


     Current Diet                                Renal Diet (since D 08/13), D


                                                 Suppl (from D 08/16).


     Labs/Tests                                  08/23: Cl 97.0, CO2 31, BUN 39


                                                 , Crea 5.9, Ca 8.2.


     Pertinent Medications                       08/23: Renvela, others


                                                 nutritionally unremarkable.


     Height                                      5 ft 9 in


     Weight                                      54.43 kg


     Ideal Body Weight (kg)                      72.72


     BMI                                         17.7


     Weight change and time frame                No body weight change reported


                                                 in 1 week.


     Weight Status                               Underweight


     Subjective/Other Information                RD consult for routine F/U on


                                                 Dietary Advancement.


                                                 Diet continues as prescribed,


                                                 Pt's PO intake of meals has


                                                 been Good (100%) and well


                                                 tolerated, according to ADL


                                                 notes.


                                                 Pt is on Room Air, O2


                                                 saturation @ 98%, according to


                                                 Physical Assessment History


                                                 notes.


                                                 Pt presents incontinence,


                                                 according to Physical


                                                 Assessment History notes.


                                                 Pt is awaiting SNF placement


                                                 and HD chair; Pt is on


                                                 restrains for safety,


                                                 according to Progress notes.


     Percent of energy/protein needs met:        Prescribed Renal Diet provides


                                                 for energy/protein needs (2,


                                                 072 Kcal/77 g) during LOS;


                                                 additionally, Dietary


                                                 Supplements will compensate


                                                 for possible poor or


                                                 insufficient PO intake of


                                                 meals with 1,275 Kcal and 57 g


                                                 of protein.


     Burn                                        Absent


     Trauma                                      Absent


     GI Symptoms                                 Other


     Food Allergy                                No


     Skin Integrity/Comment                      Assessment WNL.


     Current % PO                                Good (%)


     Minimum of two criteria                     No


     Fluid Accumulation                          N/A


     Reduced  Strength                       N/A (non-severe)


     Protein-Calorie Malnutrition                N\A


     #1


      Nutrition Diagnosis                        Inadequate protein-energy


                                                 intake


      Comments:                                  Diet continues as prescribed,


                                                 Pt's PO intake of meals has


                                                 been Good (100%) and well


                                                 tolerated, according to ADL


                                                 notes.


                                                 No body weight change reported


                                                 in 1 week.


      Diagnosis Progress(for reassessment        Improved


       documentation)                            


     Is patient on ventilator?                   No


     Is Patient Ambulatory and/or Out of Bed     No


     REE-([facility]-confined to bed)      [phone]


     Kcal/Kg value to use for calculation        36


     Approximate Energy Requirements Using       1959


      kcal/Kg                                    


     Calculation Used for Recommendations        Kcal/kg


     Additional Notes                            Protein: >1.2 g/Kg ABW; >65 g/


                                                 day.


                                                 Fluids: 1 ml/Kcal, or as per


                                                 MD.


 Nutrition Intervention


     Change Diet Order:                          Continue Renal Diet as


                                                 tolerated.


     Add Supplement/Snack (indicate name/kcal    Continue Nepro w/CARBSTEADY;


      /protein )                                 TID.


     Provides kCal:                              1,275


     Provides Protein (gm)                       57


     Goal #1                                     Compensate, through dietary


                                                 supplementation, for possible


                                                 poor or insufficient PO intake


                                                 of meals during LOS.


     Goal #2                                     Adjust the dietary


                                                 intervention to better serve


                                                 Pt's needs and clinical


                                                 conditions during LOS.


     Follow-Up By:                               08/30/22


     Additional Comments                         Continue monitoring food


                                                 tolerance, %PO intake of meals


                                                 , dietary supplements, and BM.

## 2022-08-25 NOTE — PROGRESS NOTE
Assessment and Plan


1. ESRD:


Patient is on maintenance HD.


Last outpatient HD unknown.


Meds dosage based on GFR.


Hemodialysis: 8/13, 8/15, 8/17, 8/19, 8/22.


Again patient refused HD today.





2. FEN:


Hyperkalemia, improved, on HD.


Hypernatremia, improved.


On Phos binders.


Monitor lytes and volume status.





3. Acute Metabolic Encephalopathy, POA:


Suspected Uremic encephalopathy.


Baseline cognitive decline.


Monitor.





4. Seizure:


On Keppra.





5. Normocytic anemia, POA:


S/p PRBC.


Epogen with HD.


Monitor.





6. T2DM (type 2 diabetes mellitus):


SSI.





7. Medical non-compliance:


Counseled.











Subjective:


Patient was seen and examined at the bedside.











Examination:


General appearance: well-developed, emaciated, no distress


HEENT: ATNC, pupils equal


Neck: trachea midline


Respiratory: Clear to Auscultation


Cardiology: regular, S1S2, no murmur


Gastrointestinal: soft, normoactive bowel sounds, not tender, ND


Integumentary: diffuse discrete papular rash, fading


Neurologic: alert, oriented to self, able to move extremities


Ext: no edema noted


Hemodialysis access: L arm AVF/AVG





Subjective


Date of service: 08/25/22





Objective





- Vital Signs


Vital signs: 


                               Vital Signs - 12hr











  08/25/22 08/25/22 08/25/22





  06:40 08:38 09:44


 


Temperature 97.3 F L  


 


Pulse Rate 79  


 


Respiratory 18  





Rate   


 


Blood Pressure 124/63  109/54


 


O2 Sat by Pulse 99 96 





Oximetry   














  08/25/22 08/25/22





  09:52 09:53


 


Temperature  


 


Pulse Rate 78 78


 


Respiratory  





Rate  


 


Blood Pressure  109/54


 


O2 Sat by Pulse  





Oximetry  














- Lab





                                 08/23/22 05:42





                                 08/23/22 05:42


                             Most recent lab results











Calcium  8.2 mg/dL (8.4-10.2)  L  08/23/22  05:42    


 


Phosphorus  8.30 mg/dL (2.5-4.5)  H  08/19/22  07:45    


 


Magnesium  2.20 mg/dL (1.7-2.3)   08/14/22  07:22    














Medications & Allergies





- Medications


Allergies/Adverse Reactions: 


                                    Allergies





No Known Allergies Allergy (Unverified 06/07/22 18:10)


   








Home Medications: 


                                Home Medications











 Medication  Instructions  Recorded  Confirmed  Last Taken  Type


 


Aspirin EC [Halfprin EC] 81 mg PO QDAY #30 tablet. 08/25/22  Unknown Rx


 


Metoprolol [Lopressor TAB] 100 mg PO BID #60 tablet 08/25/22  Unknown Rx


 


Sevelamer Carbonate [Renvela] 2,400 mg PO AC #60 tablet 08/25/22  Unknown Rx


 


Zolpidem [Ambien] 5 mg PO QHS PRN #7 tablet 08/25/22  Unknown Rx


 


levETIRAcetam [Keppra TAB] 500 mg PO BID #60 tablet 08/25/22  Unknown Rx











Active Medications: 














Generic Name Dose Route Start Last Admin





  Trade Name Freq  PRN Reason Stop Dose Admin


 


Acetaminophen  650 mg  08/13/22 19:48 





  Acetaminophen 325 Mg Tab  PO  





  Q4H PRN  





  Pain MILD(1-3)/Fever >100.5/HA  


 


Aspirin  81 mg  08/14/22 10:00  08/25/22 09:43





  Aspirin Ec 81 Mg Tab  PO   81 mg





  QDAY LASHA   Administration


 


Dextrose  25 ml  08/20/22 22:03  08/20/22 23:21





  Dextrose 50% In Water (25gm) 50 Ml Syringe  IV   25 ml





  Q30MIN PRN   Administration





  Hypoglycemia  





  Protocol  


 


Epoetin Jesus-epbx  20,000 unit  08/16/22 21:57  08/22/22 12:28





  Epoetin Jesus-Epbx 10,000 Unit/1 Ml Vial  SUB-Q   20,000 unit





  EUSEBIA PRN   Administration





  hemodialysis  


 


Guaifenesin  200 mg  08/18/22 01:57  08/24/22 22:19





  Guaifenesin 100 Mg/5 Ml Oral Liqd  PO   200 mg





  Q4H PRN   Administration





  Cough  


 


Heparin Sodium (Porcine)  5,000 unit  08/13/22 22:00  08/25/22 09:43





  Heparin 5,000 Unit/1 Ml Vial  SUB-Q   5,000 unit





  Q12HR LASHA   Administration


 


Heparin Sodium (Porcine)  3,000 unit  08/25/22 10:39 





  Heparin 10,000 Units/10 Ml Vial  IV  





  EUSEBIA PRN  





  hemodialysis  


 


Hydromorphone HCl  0.5 mg  08/13/22 19:48 





  Hydromorphone 0.5 Mg/0.5 Ml Inj  IV  





  Q3H PRN  





  Pain , Severe (7-10)  


 


Sodium Chloride  100 mls @ 999 mls/hr  08/19/22 08:23 





  Nacl 0.9%  IV  





  EUSEBIA PRN  





  Hypotension  


 


Insulin Human Lispro  0 unit  08/14/22 07:30  08/25/22 12:01





  Insulin Lispro 100 Unit/Ml  SUB-Q   Not Given





  [facility]  





  Protocol  


 


Levetiracetam  500 mg  08/14/22 10:00  08/25/22 09:43





  Levetiracetam 500 Mg Tab  PO   500 mg





  BID LASHA   Administration


 


Metoclopramide HCl  5 mg  08/16/22 09:00  08/22/22 01:51





  Metoclopramide 10 Mg/2 Ml Inj  IV   5 mg





  Q8H PRN   Administration





  Nausea And Vomiting  


 


Metoprolol Tartrate  100 mg  08/14/22 10:00  08/25/22 09:53





  Metoprolol Tartrate 100 Mg Tab  PO   Not Given





  BID [facility]  


 


Morphine Sulfate  2 mg  08/13/22 19:48 





  Morphine 2 Mg/1 Ml Inj  IV  





  Q4H PRN  





  Pain, Moderate (4-6)  


 


Ondansetron HCl  4 mg  08/13/22 19:48  08/24/22 22:15





  Ondansetron 4 Mg/2 Ml Inj  IV   4 mg





  Q8H PRN   Administration





  Nausea And Vomiting  


 


Oxycodone/Acetaminophen  1 tab  08/13/22 19:48 





  Oxycodone /Acetaminophen 5-325mg Tab  PO  





  Q6H PRN  





  Pain, Moderate (4-6)  


 


Sevelamer Carbonate  2,400 mg  08/14/22 12:30  08/25/22 09:43





  Sevelamer Carbonate 800 Mg Tab  PO   2,400 mg





  AC LASHA   Administration


 


Sodium Chloride  10 ml  08/13/22 22:00  08/25/22 09:43





  Sodium Chloride 0.9% 10 Ml Flush Syringe  IV   Not Given





  BID LASHA  


 


Sodium Chloride  10 ml  08/13/22 19:48 





  Sodium Chloride 0.9% 10 Ml Flush Syringe  IV  





  PRN PRN  





  LINE FLUSH  


 


Zolpidem Tartrate  5 mg  08/18/22 01:56  08/22/22 01:00





  Zolpidem 5 Mg Tab  PO   5 mg





  QHS PRN   Administration





  Sleep

## 2022-08-25 NOTE — DISCHARGE SUMMARY
Providers





- Providers


Date of Admission: 


08/13/22 19:48





Date of discharge: 08/25/22


Attending physician: 


AMY J KOCHERLA





                                        





08/13/22 04:12


Consult to Physician [CONS] Stat 


   Comment: 


   Consulting Provider: MARIO MASON


   Physician Instructions: 


   Reason For Exam: uremia





08/13/22 13:38


Consult to Physician [CONS] Stat 


   Comment: 


   Consulting Provider: ASIM RAYMUNDO


   Physician Instructions: 


   Reason For Exam: uremia





08/14/22 13:17


Consult to Case Management [CONS] Routine 


   Services Needed at Discharge: Other


   Notified:: CM


   Comment:: verify patient still has OP dialysis chair.





08/15/22 15:59


Occupational Therapy Evaluate and Treat [CONS] Routine 


   Comment: 


   Reason For Exam: Debility


Physical Therapy Evaluation and Treat [CONS] Routine 


   Comment: 


   Reason For Exam: Debility











Primary care physician: 


ARIES HARRINGTON








Hospitalization


Condition: Critical


Hospital course: 


51-year-old  seizure disorder hypertension type 2 diabetes and end-stage 

renal disease on hemodialysis presents with lethargy and confusion.  Family 

brought him to the emergency room stating that he is confused not oriented.  His

 last known dialysis is not known.  Improvement on fluids.  No fever or chills.


No seizures.  Noncompliant with his medications.





Assessment and Plan:


(1) Acute metabolic encephalopathy (resolved)


Secondary to severe uremia.  His creatinine is 32.1 and BUN is 242.  


Family does not know how many days he has missed his hemodialysis.  


Patient to be counseled about compliance once he  is more alert and oriented.





(2) Hyperkalemia (improved)


Treated in the emergency room and patient going for hemodialysis


Correct with HD,





(3) Metabolic acidosis (resolved)


Secondary to severe uremia.  ESRD


Improved after dialysis





(4) Seizure disorder


Continue Keppra.,  Seizure precautions


Supportive care





(5) T2DM (type 2 diabetes mellitus)


Coverage for now, [employer] sliding scale coverage


ADA diet





(6) ESRD needing dialysis


Continue hemodialysis


Nephrology following


Patient to be counseled about compliance





(7) Anemia of chronic kidney disease


Secondary to chronic kidney disease


Epogen as per nephrology


1 unit prbc given on 8/14.


Hgb and VSS currently stable.


Closely monitor H&H transfuse additional PRBC as needed





(8) Hypertension


Continue antihypertensives





(9) severe protein calorie malnutrition


Dietary supplements initiated


Dietitian consult requested





hospital course:


8/14: AOx4 on bedside encounter. Transfused 1 unit due to hgb 6.8. Patient 

stated he has now missed several dialysis sessions. He goes to [facility] per patient. will place CM consultation to ensure patient still has 

chair. Anticipate d/c tomorrow.





8/15: Hgb improved to 7.7. Will work with CM regarding dialysis chair as patient

 prior chair due to noncompliance PTA.





8/16: Await case management decision regarding outpatient hemodialysis





8/17: Physical therapy recommends SNF placement.  We will discuss with case 

management disposition.  Continue hemodialysis per nephrology recommendations.





8/18: Awaiting for arrangements for hemodialysis associated with skilled nursing

 facility or outpatient hemodialysis chair.  I discussed the case with case 

management





8/19:   Awaiting for arrangements for hemodialysis associated with skilled 

nursing facility or outpatient hemodialysis chair.  Patient is less confused and

 appears to be back to baseline.





8/20:  Awaiting for arrangements for hemodialysis associated with skilled 

nursing facility or outpatient hemodialysis chair.  Patient appears to be 

confused this morning.  Continue restraints for safety





8/21:  Awaiting for arrangements for hemodialysis associated with skilled 

nursing facility or outpatient hemodialysis chair.  Patient still with 

metabolic/uremic encephalopathy.  Patient has had baseline cognitive decline.  

Continue restraints for safety





8/22: Patient remains confused and likely has had baseline cognitive decline.   

Awaiting for arrangements for hemodialysis associated with skilled nursing 

facility or outpatient hemodialysis chair.  Continue restraints for safety.





8/23; awaiting SNF/HD facility placement, DC planning per case management


8/24/2022; pending placement





Spoke with patient's daughter Ms. Brandon extensively when she visited her father,

 patient's condition, tests and reports, consultants recommendations


Need for hemodialysis, she had numerous questions I answered all of them, she 

wanted to talk to nephrologist, directed her to the nurse to connect her to the 

nephrologist.  She verbalized understanding and was appreciative











Disposition: 06 HOME HEALTH CARE SERVICE


Final Discharge Diagnosis (Prints w/discharge instructions): Acute metabolic 

encephalopathy slightly improved.  Hyperkalemia improved.  Metabolic acidosis 

improved.  Seizure disorder.  Type 2 diabetes mellitus.  End-stage renal disease

 on dialysis.  Anemia of chronic disease.  Hypertension.  Severe protein calorie

 malnutrition.  Medical noncompliance





Exam





- Constitutional


Vitals: 


                                        











Temp Pulse Resp BP Pulse Ox


 


 97.3 F L  78   18   109/54   96 


 


 08/25/22 06:40  08/25/22 09:53  08/25/22 06:40  08/25/22 09:53  08/25/22 08:38














Plan


Additional Instructions: Advised to follow renal, hemodialysis per schedule.  

Strongly advised to comply with medications, diet, follow-up visits and 

dialysis.  If you have worsening symptoms contact MD or go to the nearest 

emergency room as needed


Follow up with: 


ARIES HARRINGTON MD [Primary Care Provider] - 3-5 Days


ASIM RAYMUNDO MD [Staff Physician] - 7 Days


Prescriptions: 


Zolpidem [Ambien] 5 mg PO QHS PRN #7 tablet


 PRN Reason: Sleep


Aspirin EC [Halfprin EC] 81 mg PO QDAY #30 tablet.


levETIRAcetam [Keppra TAB] 500 mg PO BID #60 tablet


Metoprolol [Lopressor TAB] 100 mg PO BID #60 tablet


Sevelamer Carbonate [Renvela] 2,400 mg PO AC #60 tablet [Mother] : mother

## 2022-08-26 VITALS — DIASTOLIC BLOOD PRESSURE: 67 MMHG | SYSTOLIC BLOOD PRESSURE: 119 MMHG

## 2022-08-26 LAB
BUN SERPL-MCNC: 78 MG/DL (ref 9–20)
BUN/CREAT SERPL: 7 %
CALCIUM SERPL-MCNC: 8.3 MG/DL (ref 8.4–10.2)
HEMOLYSIS INDEX: 0

## 2022-08-26 PROCEDURE — 5A1D70Z PERFORMANCE OF URINARY FILTRATION, INTERMITTENT, LESS THAN 6 HOURS PER DAY: ICD-10-PCS | Performed by: INTERNAL MEDICINE

## 2022-08-26 RX ADMIN — SEVELAMER CARBONATE SCH: 800 TABLET, FILM COATED ORAL at 08:32

## 2022-08-26 RX ADMIN — Medication SCH ML: at 22:53

## 2022-08-26 RX ADMIN — Medication SCH: at 19:52

## 2022-08-26 RX ADMIN — METOPROLOL TARTRATE SCH: 100 TABLET, FILM COATED ORAL at 10:50

## 2022-08-26 RX ADMIN — INSULIN LISPRO SCH: 100 INJECTION, SOLUTION INTRAVENOUS; SUBCUTANEOUS at 17:30

## 2022-08-26 RX ADMIN — HEPARIN SODIUM SCH UNIT: 5000 INJECTION, SOLUTION INTRAVENOUS; SUBCUTANEOUS at 22:52

## 2022-08-26 RX ADMIN — INSULIN LISPRO SCH: 100 INJECTION, SOLUTION INTRAVENOUS; SUBCUTANEOUS at 11:30

## 2022-08-26 RX ADMIN — SEVELAMER CARBONATE SCH: 800 TABLET, FILM COATED ORAL at 18:32

## 2022-08-26 RX ADMIN — SEVELAMER CARBONATE SCH: 800 TABLET, FILM COATED ORAL at 11:32

## 2022-08-26 RX ADMIN — ASPIRIN SCH: 81 TABLET, COATED ORAL at 10:48

## 2022-08-26 RX ADMIN — INSULIN LISPRO SCH UNIT: 100 INJECTION, SOLUTION INTRAVENOUS; SUBCUTANEOUS at 22:52

## 2022-08-26 RX ADMIN — EPOETIN ALFA-EPBX PRN UNIT: 10000 INJECTION, SOLUTION INTRAVENOUS; SUBCUTANEOUS at 11:25

## 2022-08-26 RX ADMIN — HEPARIN SODIUM SCH: 5000 INJECTION, SOLUTION INTRAVENOUS; SUBCUTANEOUS at 10:48

## 2022-08-26 RX ADMIN — METOPROLOL TARTRATE SCH MG: 100 TABLET, FILM COATED ORAL at 22:52

## 2022-08-26 RX ADMIN — INSULIN LISPRO SCH: 100 INJECTION, SOLUTION INTRAVENOUS; SUBCUTANEOUS at 07:29

## 2022-08-26 NOTE — PROGRESS NOTE
Assessment and Plan


1. ESRD:


Patient is on maintenance HD.


Last outpatient HD unknown.


Meds dosage based on GFR.


Hemodialysis: 8/13, 8/15, 8/17, 8/19, 8/22.


Patient refused HD today.





2. FEN:


Hyperkalemia, improved, on HD.


Hypernatremia, improved.


On Phos binders.


Monitor lytes and volume status.





3. Acute Metabolic Encephalopathy, POA:


Suspected Uremic encephalopathy.


Baseline cognitive decline.


Monitor.





4. Seizure:


On Keppra.





5. Normocytic anemia, POA:


S/p PRBC.


Epogen with HD.


Monitor.





6. T2DM (type 2 diabetes mellitus):


SSI.





7. Medical non-compliance:


Counseled.











Subjective:


Patient was seen and examined at the bedside.











Examination:


General appearance: well-developed, emaciated, no distress


HEENT: ATNC, pupils equal


Neck: trachea midline


Respiratory: Clear to Auscultation


Cardiology: regular, S1S2, no murmur


Gastrointestinal: soft, normoactive bowel sounds, not tender, ND


Integumentary: diffuse discrete papular rash, fading


Neurologic: alert, oriented to self, able to move extremities


Ext: no edema noted


Hemodialysis access: L arm AVF/AVG





Subjective


Date of service: 08/26/22





Objective





- Vital Signs


Vital signs: 


                               Vital Signs - 12hr











  08/25/22 08/25/22 08/25/22





  22:00 22:05 22:09


 


Temperature  98.4 F 


 


Pulse Rate  86 86


 


Respiratory  18 





Rate   


 


Blood Pressure  131/74 131/74


 


O2 Sat by Pulse 94 94 





Oximetry   














  08/26/22





  02:30


 


Temperature 98.0 F


 


Pulse Rate 78


 


Respiratory 18





Rate 


 


Blood Pressure 109/61


 


O2 Sat by Pulse 99





Oximetry 














- Lab





                                 08/23/22 05:42





                                 08/26/22 04:00


                             Most recent lab results











Calcium  8.3 mg/dL (8.4-10.2)  L  08/26/22  04:00    


 


Phosphorus  8.30 mg/dL (2.5-4.5)  H  08/19/22  07:45    


 


Magnesium  2.20 mg/dL (1.7-2.3)   08/14/22  07:22    














Medications & Allergies





- Medications


Allergies/Adverse Reactions: 


                                    Allergies





No Known Allergies Allergy (Unverified 06/07/22 18:10)


   








Home Medications: 


                                Home Medications











 Medication  Instructions  Recorded  Confirmed  Last Taken  Type


 


Aspirin EC [Halfprin EC] 81 mg PO QDAY #30 tablet. 08/25/22  Unknown Rx


 


Metoprolol [Lopressor TAB] 100 mg PO BID #60 tablet 08/25/22  Unknown Rx


 


Sevelamer Carbonate [Renvela] 2,400 mg PO AC #60 tablet 08/25/22  Unknown Rx


 


Zolpidem [Ambien] 5 mg PO QHS PRN #7 tablet 08/25/22  Unknown Rx


 


levETIRAcetam [Keppra TAB] 500 mg PO BID #60 tablet 08/25/22  Unknown Rx











Active Medications: 














Generic Name Dose Route Start Last Admin





  Trade Name Freq  PRN Reason Stop Dose Admin


 


Acetaminophen  650 mg  08/13/22 19:48 





  Acetaminophen 325 Mg Tab  PO  





  Q4H PRN  





  Pain MILD(1-3)/Fever >100.5/HA  


 


Aspirin  81 mg  08/14/22 10:00  08/25/22 09:43





  Aspirin Ec 81 Mg Tab  PO   81 mg





  QDAY LASHA   Administration


 


Dextrose  25 ml  08/20/22 22:03  08/20/22 23:21





  Dextrose 50% In Water (25gm) 50 Ml Syringe  IV   25 ml





  Q30MIN PRN   Administration





  Hypoglycemia  





  Protocol  


 


Epoetin Jesus-epbx  20,000 unit  08/16/22 21:57  08/22/22 12:28





  Epoetin Jesus-Epbx 10,000 Unit/1 Ml Vial  SUB-Q   20,000 unit





  EUSEBIA PRN   Administration





  hemodialysis  


 


Guaifenesin  200 mg  08/18/22 01:57  08/24/22 22:19





  Guaifenesin 100 Mg/5 Ml Oral Liqd  PO   200 mg





  Q4H PRN   Administration





  Cough  


 


Heparin Sodium (Porcine)  5,000 unit  08/13/22 22:00  08/25/22 22:10





  Heparin 5,000 Unit/1 Ml Vial  SUB-Q   5,000 unit





  Q12HR LASHA   Administration


 


Heparin Sodium (Porcine)  3,000 unit  08/25/22 10:39 





  Heparin 10,000 Units/10 Ml Vial  IV  





  EUSEBIA PRN  





  hemodialysis  


 


Hydromorphone HCl  0.5 mg  08/13/22 19:48 





  Hydromorphone 0.5 Mg/0.5 Ml Inj  IV  





  Q3H PRN  





  Pain , Severe (7-10)  


 


Sodium Chloride  100 mls @ 999 mls/hr  08/19/22 08:23 





  Nacl 0.9%  IV  





  EUSEBIA PRN  





  Hypotension  


 


Insulin Human Lispro  0 unit  08/14/22 07:30  08/25/22 22:23





  Insulin Lispro 100 Unit/Ml  SUB-Q   Not Given





  [facility]  





  Protocol  


 


Levetiracetam  500 mg  08/14/22 10:00  08/25/22 22:09





  Levetiracetam 500 Mg Tab  PO   500 mg





  BID LASHA   Administration


 


Metoclopramide HCl  5 mg  08/16/22 09:00  08/22/22 01:51





  Metoclopramide 10 Mg/2 Ml Inj  IV   5 mg





  Q8H PRN   Administration





  Nausea And Vomiting  


 


Metoprolol Tartrate  100 mg  08/14/22 10:00  08/25/22 22:09





  Metoprolol Tartrate 100 Mg Tab  PO   100 mg





  BID LASHA   Administration


 


Morphine Sulfate  2 mg  08/13/22 19:48 





  Morphine 2 Mg/1 Ml Inj  IV  





  Q4H PRN  





  Pain, Moderate (4-6)  


 


Ondansetron HCl  4 mg  08/13/22 19:48  08/24/22 22:15





  Ondansetron 4 Mg/2 Ml Inj  IV   4 mg





  Q8H PRN   Administration





  Nausea And Vomiting  


 


Oxycodone/Acetaminophen  1 tab  08/13/22 19:48 





  Oxycodone /Acetaminophen 5-325mg Tab  PO  





  Q6H PRN  





  Pain, Moderate (4-6)  


 


Sevelamer Carbonate  2,400 mg  08/14/22 12:30  08/25/22 18:13





  Sevelamer Carbonate 800 Mg Tab  PO   Not Given





  AC LASHA  


 


Sodium Chloride  10 ml  08/13/22 22:00  08/25/22 22:11





  Sodium Chloride 0.9% 10 Ml Flush Syringe  IV   10 ml





  BID LASHA   Administration


 


Sodium Chloride  10 ml  08/13/22 19:48 





  Sodium Chloride 0.9% 10 Ml Flush Syringe  IV  





  PRN PRN  





  LINE FLUSH  


 


Zolpidem Tartrate  5 mg  08/18/22 01:56  08/22/22 01:00





  Zolpidem 5 Mg Tab  PO   5 mg





  QHS PRN   Administration





  Sleep

## 2022-08-26 NOTE — PROGRESS NOTE
Assessment and Plan


Assessment and plan: 


51-year-old  seizure disorder hypertension type 2 diabetes and end-stage 

renal disease on hemodialysis presents with lethargy and confusion.  Family 

brought him to the emergency room stating that he is confused not oriented.  His

last known dialysis is not known.  Improvement on fluids.  No fever or chills.


No seizures.  Noncompliant with his medications. Patient has been refusing 

dialysis for few days, however today 8 /26 he agreed for dialysis and received 

HD today








Assessment and Plan:


(1) Acute metabolic encephalopathy (resolved)


Secondary to severe uremia.  His creatinine is 32.1 and BUN is 242.  


Family does not know how many days he has missed his hemodialysis.  


Patient to be counseled about compliance once he  is more alert and oriented.





(2) Hyperkalemia (improved)


Treated in the emergency room and patient going for hemodialysis


Correct with HD,





(3) Metabolic acidosis (resolved)


Secondary to severe uremia.  ESRD


Improved after dialysis





(4) Seizure disorder


Continue Keppra.,  Seizure precautions


Supportive care





(5) T2DM (type 2 diabetes mellitus)


Coverage for now, [employer] sliding scale coverage


ADA diet





(6) ESRD needing dialysis


Continue hemodialysis


Nephrology following


Patient to be counseled about compliance





(7) Anemia of chronic kidney disease


Secondary to chronic kidney disease


Epogen as per nephrology


1 unit prbc given on 8/14.


Hgb and VSS currently stable.


Closely monitor H&H transfuse additional PRBC as needed





(8) Hypertension


Continue antihypertensives





(9) severe protein calorie malnutrition


Dietary supplements initiated


Dietitian consult requested





Advance care planning; +30 minutes


Patient's condition, tests and reports discussed with the patient and the 

daughter, consultants recommendations 


discussed with the patient and the daughter Need for hemodialysis, outpatient HD

chair placement discussed with the daughter and the patient


Noncompliance with hemodialysis and its risks and consequences explained to the 

patient and the daughter.  Agreed with the treatment plan





hospital course:


8/14: AOx4 on bedside encounter. Transfused 1 unit due to hgb 6.8. Patient 

stated he has now missed several dialysis sessions. He goes to [facility] per patient. will place CM consultation to ensure patient still has 

chair. Anticipate d/c tomorrow.





8/15: Hgb improved to 7.7. Will work with CM regarding dialysis chair as patient

prior chair due to noncompliance PTA.





8/16: Await case management decision regarding outpatient hemodialysis





8/17: Physical therapy recommends SNF placement.  We will discuss with case 

management disposition.  Continue hemodialysis per nephrology recommendations.





8/18: Awaiting for arrangements for hemodialysis associated with skilled nursing

facility or outpatient hemodialysis chair.  I discussed the case with case manag

yaakov





8/19:   Awaiting for arrangements for hemodialysis associated with skilled 

nursing facility or outpatient hemodialysis chair.  Patient is less confused and

appears to be back to baseline.





8/20:  Awaiting for arrangements for hemodialysis associated with skilled 

nursing facility or outpatient hemodialysis chair.  Patient appears to be 

confused this morning.  Continue restraints for safety





8/21:  Awaiting for arrangements for hemodialysis associated with skilled 

nursing facility or outpatient hemodialysis chair.  Patient still with 

metabolic/uremic encephalopathy.  Patient has had baseline cognitive decline.  

Continue restraints for safety





8/22: Patient remains confused and likely has had baseline cognitive decline.   

Awaiting for arrangements for hemodialysis associated with skilled nursing 

facility or outpatient hemodialysis chair.  Continue restraints for safety.





8/23; awaiting SNF/HD facility placement, DC planning per case management


8/24/2022; pending placement





Spoke with patient's daughter Ms. Brandon extensively when she visited her father,

patient's condition, tests and reports, consultants recommendations


Need for hemodialysis, she had numerous questions I answered all of them, she 

wanted to talk to nephrologist, directed her to the nurse to connect her to the 

nephrologist.  She verbalized understanding and was appreciative





8/26/22; after refusing few treatments of hemodialysis, the patient agreed for 

dialysis and received 1


DC planning per case management, home with home health versus placement


Patient is medically stable for discharge











History


Interval history: 


After refusing few treatments of hemodialysis ,patient agreed 


and received hemodialysis today 8/26/2022


No new complaints, vital signs reviewed


Awaiting placement








Hospitalist Physical





- Constitutional


Vitals: 


                                        











Temp Pulse Resp BP Pulse Ox


 


 97.5 F L  81   18   119/67   95 


 


 08/26/22 16:45  08/26/22 16:45  08/26/22 16:45  08/26/22 16:45  08/26/22 16:45











General appearance: Present: no acute distress, well-nourished, other (Confused)





- EENT


Eyes: Present: PERRL, EOM intact





- Neck


Neck: Present: supple, normal ROM





- Respiratory


Respiratory effort: normal


Respiratory: bilateral: diminished, negative: rales, rhonchi, wheezing





- Cardiovascular


Rhythm: regular


Heart Sounds: Present: S1 & S2





- Extremities


Extremities: no ischemia, No edema





- Abdominal


General gastrointestinal: soft, non-tender, non-distended, normal bowel sounds





- Integumentary


Integumentary: Present: clear





- Psychiatric


Psychiatric: appropriate mood/affect, cooperative





- Neurologic


Neurologic: moves all extremities





HEART Score





- HEART Score


Troponin: 


                                        











Troponin T  0.933 ng/mL (0.00-0.029)  H*  08/13/22  01:28    














Results





- Labs


CBC & Chem 7: 


                                 08/23/22 05:42





                                 08/26/22 04:00


Labs: 


                             Laboratory Last Values











WBC  9.1 K/mm3 (4.5-11.0)   08/23/22  05:42    


 


RBC  2.44 M/mm3 (3.65-5.03)  L  08/23/22  05:42    


 


Hgb  7.5 gm/dl (11.8-15.2)  L  08/23/22  05:42    


 


Hct  21.8 % (35.5-45.6)  L  08/23/22  05:42    


 


MCV  89 fl (84-94)   08/23/22  05:42    


 


MCH  31 pg (28-32)   08/23/22  05:42    


 


MCHC  35 % (32-34)  H  08/23/22  05:42    


 


RDW  16.0 % (13.2-15.2)  H  08/23/22  05:42    


 


Plt Count  228 K/mm3 (140-440)   08/23/22  05:42    


 


Lymph % (Auto)  11.1 % (13.4-35.0)  L  08/23/22  05:42    


 


Mono % (Auto)  15.5 % (0.0-7.3)  H  08/23/22  05:42    


 


Eos % (Auto)  0.4 % (0.0-4.3)   08/23/22  05:42    


 


Baso % (Auto)  0.7 % (0.0-1.8)   08/23/22  05:42    


 


Lymph # (Auto)  1.0 K/mm3 (1.2-5.4)  L  08/23/22  05:42    


 


Mono # (Auto)  1.4 K/mm3 (0.0-0.8)  H  08/23/22  05:42    


 


Eos # (Auto)  0.0 K/mm3 (0.0-0.4)   08/23/22  05:42    


 


Baso # (Auto)  0.1 K/mm3 (0.0-0.1)   08/23/22  05:42    


 


Seg Neutrophils %  72.3 % (40.0-70.0)  H  08/23/22  05:42    


 


Seg Neutrophils #  6.6 K/mm3 (1.8-7.7)   08/23/22  05:42    


 


PT  17.0 Sec. (12.2-14.9)  H  08/13/22  01:28    


 


INR  1.23  (0.87-1.13)  H  08/13/22  01:28    


 


Sodium  148 mmol/L (137-145)  H D 08/26/22  04:00    


 


Potassium  4.3 mmol/L (3.6-5.0)   08/26/22  04:00    


 


Chloride  94.2 mmol/L ()  L  08/26/22  04:00    


 


Carbon Dioxide  38 mmol/L (22-30)  H D 08/26/22  04:00    


 


Anion Gap  20 mmol/L  08/26/22  04:00    


 


BUN  78 mg/dL (9-20)  H  08/26/22  04:00    


 


Creatinine  10.8 mg/dL (0.8-1.3)  H D 08/26/22  04:00    


 


Estimated GFR  6 ml/min  08/26/22  04:00    


 


BUN/Creatinine Ratio  7 %  08/26/22  04:00    


 


Glucose  80 mg/dL ()   08/26/22  04:00    


 


POC Glucose  82 mg/dL ()   08/25/22  22:03    


 


Calcium  8.3 mg/dL (8.4-10.2)  L  08/26/22  04:00    


 


Phosphorus  8.30 mg/dL (2.5-4.5)  H  08/19/22  07:45    


 


Magnesium  2.20 mg/dL (1.7-2.3)   08/14/22  07:22    


 


Total Bilirubin  0.30 mg/dL (0.1-1.2)   08/15/22  04:23    


 


AST  16 units/L (5-40)   08/15/22  04:23    


 


ALT  16 units/L (7-56)   08/15/22  04:23    


 


Alkaline Phosphatase  60 units/L ()   08/15/22  04:23    


 


Ammonia  39.0 umol/L (25-60)   08/22/22  05:26    


 


Total Creatine Kinase  1147 units/L ()  H  08/13/22  01:28    


 


Troponin T  0.933 ng/mL (0.00-0.029)  H*  08/13/22  01:28    


 


Total Protein  6.3 g/dL (6.3-8.2)   08/15/22  04:23    


 


Albumin  2.4 g/dL (3.9-5)  L  08/15/22  04:23    


 


Albumin/Globulin Ratio  0.6 %  08/15/22  04:23    


 


Triglycerides  178 mg/dL (2-149)  H  08/13/22  01:28    


 


Cholesterol  112 mg/dL ()   08/13/22  01:28    


 


LDL Cholesterol Direct  30 mg/dL ()  L  08/13/22  01:28    


 


HDL Cholesterol  39 mg/dL (40-59)  L  08/13/22  01:28    


 


Cholesterol/HDL Ratio  2.87 %  08/13/22  01:28    


 


Salicylates  < 0.3 mg/dL (2.8-20.0)  L  08/13/22  01:28    


 


Acetaminophen  5.0 ug/mL (10.0-30.0)  L  08/13/22  01:28    


 


Plasma/Serum Alcohol  < 0.01 % (0-0.07)   08/13/22  01:28    


 


Coronavirus (PCR)  Negative  (Negative)   08/14/22  Unknown


 


SARS-CoV-2 (PCR)  Negative  (Negative)   08/15/22  11:05    


 


Hepatitis A IgM Ab  Non-reactive  (NonReactive)   08/13/22  01:28    


 


Hep Bs Antigen  Non-reactive  (Negative)   08/13/22  01:28    


 


Hep B Core IgM Ab  Non-reactive  (NonReactive)   08/13/22  01:28    


 


Hepatitis C Antibody  Non-reactive  (NonReactive)   08/13/22  01:28    


 


Blood Type  A POSITIVE   08/14/22  10:00    


 


Antibody Screen  Negative   08/14/22  10:00    


 


Crossmatch  See Detail   08/14/22  10:00    











Petty/IV: 


                                        





Voiding Method                   Condom Catheter











Active Medications





- Current Medications


Current Medications: 














Generic Name Dose Route Start Last Admin





  Trade Name Freq  PRN Reason Stop Dose Admin


 


Acetaminophen  650 mg  08/13/22 19:48 





  Acetaminophen 325 Mg Tab  PO  





  Q4H PRN  





  Pain MILD(1-3)/Fever >100.5/HA  


 


Aspirin  81 mg  08/14/22 10:00  08/25/22 09:43





  Aspirin Ec 81 Mg Tab  PO   81 mg





  QDAY LASHA   Administration


 


Dextrose  25 ml  08/20/22 22:03  08/20/22 23:21





  Dextrose 50% In Water (25gm) 50 Ml Syringe  IV   25 ml





  Q30MIN PRN   Administration





  Hypoglycemia  





  Protocol  


 


Epoetin Jesus-epbx  20,000 unit  08/16/22 21:57  08/26/22 11:25





  Epoetin Jesus-Epbx 10,000 Unit/1 Ml Vial  SUB-Q   20,000 unit





  EUSEBIA PRN   Administration





  hemodialysis  


 


Guaifenesin  200 mg  08/18/22 01:57  08/24/22 22:19





  Guaifenesin 100 Mg/5 Ml Oral Liqd  PO   200 mg





  Q4H PRN   Administration





  Cough  


 


Heparin Sodium (Porcine)  5,000 unit  08/13/22 22:00  08/25/22 22:10





  Heparin 5,000 Unit/1 Ml Vial  SUB-Q   5,000 unit





  Q12HR LASHA   Administration


 


Heparin Sodium (Porcine)  3,000 unit  08/25/22 10:39 





  Heparin 10,000 Units/10 Ml Vial  IV  





  EUSEBIA PRN  





  hemodialysis  


 


Hydromorphone HCl  0.5 mg  08/13/22 19:48 





  Hydromorphone 0.5 Mg/0.5 Ml Inj  IV  





  Q3H PRN  





  Pain , Severe (7-10)  


 


Sodium Chloride  100 mls @ 999 mls/hr  08/19/22 08:23 





  Nacl 0.9%  IV  





  EUSEBIA PRN  





  Hypotension  


 


Insulin Human Lispro  0 unit  08/14/22 07:30  08/25/22 22:23





  Insulin Lispro 100 Unit/Ml  SUB-Q   Not Given





  ACHS LASHA  





  Protocol  


 


Levetiracetam  500 mg  08/14/22 10:00  08/25/22 22:09





  Levetiracetam 500 Mg Tab  PO   500 mg





  BID LASHA   Administration


 


Metoclopramide HCl  5 mg  08/16/22 09:00  08/22/22 01:51





  Metoclopramide 10 Mg/2 Ml Inj  IV   5 mg





  Q8H PRN   Administration





  Nausea And Vomiting  


 


Metoprolol Tartrate  100 mg  08/14/22 10:00  08/25/22 22:09





  Metoprolol Tartrate 100 Mg Tab  PO   100 mg





  BID LASHA   Administration


 


Morphine Sulfate  2 mg  08/13/22 19:48 





  Morphine 2 Mg/1 Ml Inj  IV  





  Q4H PRN  





  Pain, Moderate (4-6)  


 


Ondansetron HCl  4 mg  08/13/22 19:48  08/24/22 22:15





  Ondansetron 4 Mg/2 Ml Inj  IV   4 mg





  Q8H PRN   Administration





  Nausea And Vomiting  


 


Oxycodone/Acetaminophen  1 tab  08/13/22 19:48 





  Oxycodone /Acetaminophen 5-325mg Tab  PO  





  Q6H PRN  





  Pain, Moderate (4-6)  


 


Sevelamer Carbonate  2,400 mg  08/14/22 12:30  08/25/22 18:13





  Sevelamer Carbonate 800 Mg Tab  PO   Not Given





  AC LAHSA  


 


Sodium Chloride  10 ml  08/13/22 22:00  08/25/22 22:11





  Sodium Chloride 0.9% 10 Ml Flush Syringe  IV   10 ml





  BID LASHA   Administration


 


Sodium Chloride  10 ml  08/13/22 19:48 





  Sodium Chloride 0.9% 10 Ml Flush Syringe  IV  





  PRN PRN  





  LINE FLUSH  


 


Zolpidem Tartrate  5 mg  08/18/22 01:56  08/22/22 01:00





  Zolpidem 5 Mg Tab  PO   5 mg





  QHS PRN   Administration





  Sleep  














Nutrition/Malnutrition Assess





- Dietary Evaluation


Nutrition/Malnutrition Findings: 


                                        





Nutrition Notes                                            Start:  08/16/22 15

:24


Freq:                                                      Status: Active       




Protocol:                                                                       




 Document     08/23/22 12:00  GLORIA  (Rec: 08/23/22 12:18  GLORIA  [id number])


 Nutrition Notes


     Initial or Follow up                        Reassessment


     Current Diagnosis                           CKD (stage V CKD),Diabetes,


                                                 Hypertension,Malnutrition


     Other Pertinent Diagnosis                   ESRD+HD, Seizure Disorder,


                                                 Anemia, s/p Metabolic


                                                 Encephalopathy.


     Current Diet                                Renal Diet (since D 08/13), D


                                                 Suppl (from D 08/16).


     Labs/Tests                                  08/23: Cl 97.0, CO2 31, BUN 39


                                                 , Crea 5.9, Ca 8.2.


     Pertinent Medications                       08/23: Renvela, others


                                                 nutritionally unremarkable.


     Height                                      5 ft 9 in


     Weight                                      54.43 kg


     Ideal Body Weight (kg)                      72.72


     BMI                                         17.7


     Weight change and time frame                No body weight change reported


                                                 in 1 week.


     Weight Status                               Underweight


     Subjective/Other Information                RD consult for routine F/U on


                                                 Dietary Advancement.


                                                 Diet continues as prescribed,


                                                 Pt's PO intake of meals has


                                                 been Good (100%) and well


                                                 tolerated, according to ADL


                                                 notes.


                                                 Pt is on Room Air, O2


                                                 saturation @ 98%, according to


                                                 Physical Assessment History


                                                 notes.


                                                 Pt presents incontinence,


                                                 according to Physical


                                                 Assessment History notes.


                                                 Pt is awaiting SNF placement


                                                 and HD chair; Pt is on


                                                 restrains for safety,


                                                 according to Progress notes.


     Percent of energy/protein needs met:        Prescribed Renal Diet provides


                                                 for energy/protein needs (2,


                                                 072 Kcal/77 g) during LOS;


                                                 additionally, Dietary


                                                 Supplements will compensate


                                                 for possible poor or


                                                 insufficient PO intake of


                                                 meals with 1,275 Kcal and 57 g


                                                 of protein.


     Burn                                        Absent


     Trauma                                      Absent


     GI Symptoms                                 Other


     Food Allergy                                No


     Skin Integrity/Comment                      Assessment WNL.


     Current % PO                                Good (%)


     Minimum of two criteria                     No


     Fluid Accumulation                          N/A


     Reduced  Strength                       N/A (non-severe)


     Protein-Calorie Malnutrition                N\A


     #1


      Nutrition Diagnosis                        Inadequate protein-energy


                                                 intake


      Comments:                                  Diet continues as prescribed,


                                                 Pt's PO intake of meals has


                                                 been Good (100%) and well


                                                 tolerated, according to ADL


                                                 notes.


                                                 No body weight change reported


                                                 in 1 week.


      Diagnosis Progress(for reassessment        Improved


       documentation)                            


     Is patient on ventilator?                   No


     Is Patient Ambulatory and/or Out of Bed     No


     [facility]-([facility]-confined to bed)      [phone]


     Kcal/Kg value to use for calculation        36


     Approximate Energy Requirements Using       1959


      kcal/Kg                                    


     Calculation Used for Recommendations        Kcal/kg


     Additional Notes                            Protein: >1.2 g/Kg ABW; >65 g/


                                                 day.


                                                 Fluids: 1 ml/Kcal, or as per


                                                 MD.


 Nutrition Intervention


     Change Diet Order:                          Continue Renal Diet as


                                                 tolerated.


     Add Supplement/Snack (indicate name/kcal    Continue Nepro w/CARBSTEADY;


      /protein )                                 TID.


     Provides kCal:                              1,275


     Provides Protein (gm)                       57


     Goal #1                                     Compensate, through dietary


                                                 supplementation, for possible


                                                 poor or insufficient PO intake


                                                 of meals during LOS.


     Goal #2                                     Adjust the dietary


                                                 intervention to better serve


                                                 Pt's needs and clinical


                                                 conditions during LOS.


     Follow-Up By:                               08/30/22


     Additional Comments                         Continue monitoring food


                                                 tolerance, %PO intake of meals


                                                 , dietary supplements, and BM.

## 2022-09-13 ENCOUNTER — HOSPITAL ENCOUNTER (INPATIENT)
Dept: HOSPITAL 5 - ED | Age: 52
LOS: 17 days | Discharge: HOSPICE | DRG: 640 | End: 2022-09-30
Attending: INTERNAL MEDICINE | Admitting: INTERNAL MEDICINE
Payer: MEDICARE

## 2022-09-13 DIAGNOSIS — Z20.822: ICD-10-CM

## 2022-09-13 DIAGNOSIS — I12.0: ICD-10-CM

## 2022-09-13 DIAGNOSIS — Z66: ICD-10-CM

## 2022-09-13 DIAGNOSIS — E21.1: ICD-10-CM

## 2022-09-13 DIAGNOSIS — E87.5: Primary | ICD-10-CM

## 2022-09-13 DIAGNOSIS — Z91.15: ICD-10-CM

## 2022-09-13 DIAGNOSIS — Z99.2: ICD-10-CM

## 2022-09-13 DIAGNOSIS — Z51.5: ICD-10-CM

## 2022-09-13 DIAGNOSIS — D63.1: ICD-10-CM

## 2022-09-13 DIAGNOSIS — N18.6: ICD-10-CM

## 2022-09-13 DIAGNOSIS — E11.22: ICD-10-CM

## 2022-09-13 LAB
ALBUMIN SERPL-MCNC: 3.8 G/DL (ref 3.9–5)
ALT SERPL-CCNC: 26 UNITS/L (ref 7–56)
BASOPHILS # (AUTO): 0.1 K/MM3 (ref 0–0.1)
BASOPHILS NFR BLD AUTO: 1 % (ref 0–1.8)
BUN SERPL-MCNC: 142 MG/DL (ref 9–20)
BUN/CREAT SERPL: 7 %
CALCIUM SERPL-MCNC: 8.6 MG/DL (ref 8.4–10.2)
EOSINOPHIL # BLD AUTO: 0.2 K/MM3 (ref 0–0.4)
EOSINOPHIL NFR BLD AUTO: 2.9 % (ref 0–4.3)
HCT VFR BLD CALC: 25.5 % (ref 35.5–45.6)
HEMOLYSIS INDEX: 9
HGB BLD-MCNC: 8.4 GM/DL (ref 11.8–15.2)
LYMPHOCYTES # BLD AUTO: 1.2 K/MM3 (ref 1.2–5.4)
LYMPHOCYTES NFR BLD AUTO: 17.2 % (ref 13.4–35)
MCHC RBC AUTO-ENTMCNC: 33 % (ref 32–34)
MCV RBC AUTO: 94 FL (ref 84–94)
MONOCYTES # (AUTO): 0.7 K/MM3 (ref 0–0.8)
MONOCYTES % (AUTO): 9.5 % (ref 0–7.3)
PLATELET # BLD: 237 K/MM3 (ref 140–440)
RBC # BLD AUTO: 2.71 M/MM3 (ref 3.65–5.03)

## 2022-09-13 PROCEDURE — 96375 TX/PRO/DX INJ NEW DRUG ADDON: CPT

## 2022-09-13 PROCEDURE — 85014 HEMATOCRIT: CPT

## 2022-09-13 PROCEDURE — 36415 COLL VENOUS BLD VENIPUNCTURE: CPT

## 2022-09-13 PROCEDURE — 80053 COMPREHEN METABOLIC PANEL: CPT

## 2022-09-13 PROCEDURE — 96374 THER/PROPH/DIAG INJ IV PUSH: CPT

## 2022-09-13 PROCEDURE — 99285 EMERGENCY DEPT VISIT HI MDM: CPT

## 2022-09-13 PROCEDURE — 83735 ASSAY OF MAGNESIUM: CPT

## 2022-09-13 PROCEDURE — 82962 GLUCOSE BLOOD TEST: CPT

## 2022-09-13 PROCEDURE — 93005 ELECTROCARDIOGRAM TRACING: CPT

## 2022-09-13 PROCEDURE — 71045 X-RAY EXAM CHEST 1 VIEW: CPT

## 2022-09-13 PROCEDURE — 85025 COMPLETE CBC W/AUTO DIFF WBC: CPT

## 2022-09-13 PROCEDURE — 80048 BASIC METABOLIC PNL TOTAL CA: CPT

## 2022-09-13 PROCEDURE — 82947 ASSAY GLUCOSE BLOOD QUANT: CPT

## 2022-09-13 PROCEDURE — 85018 HEMOGLOBIN: CPT

## 2022-09-13 PROCEDURE — 80074 ACUTE HEPATITIS PANEL: CPT

## 2022-09-13 PROCEDURE — U0003 INFECTIOUS AGENT DETECTION BY NUCLEIC ACID (DNA OR RNA); SEVERE ACUTE RESPIRATORY SYNDROME CORONAVIRUS 2 (SARS-COV-2) (CORONAVIRUS DISEASE [COVID-19]), AMPLIFIED PROBE TECHNIQUE, MAKING USE OF HIGH THROUGHPUT TECHNOLOGIES AS DESCRIBED BY CMS-2020-01-R: HCPCS

## 2022-09-14 LAB
BUN SERPL-MCNC: 144 MG/DL (ref 9–20)
BUN/CREAT SERPL: 7 %
CALCIUM SERPL-MCNC: 8.8 MG/DL (ref 8.4–10.2)
HEMOLYSIS INDEX: 3

## 2022-09-14 PROCEDURE — 5A1D70Z PERFORMANCE OF URINARY FILTRATION, INTERMITTENT, LESS THAN 6 HOURS PER DAY: ICD-10-PCS | Performed by: INTERNAL MEDICINE

## 2022-09-14 RX ADMIN — METOPROLOL TARTRATE SCH MG: 100 TABLET, FILM COATED ORAL at 11:23

## 2022-09-14 RX ADMIN — INSULIN LISPRO SCH: 100 INJECTION, SOLUTION INTRAVENOUS; SUBCUTANEOUS at 11:37

## 2022-09-14 RX ADMIN — METOPROLOL TARTRATE SCH MG: 100 TABLET, FILM COATED ORAL at 21:46

## 2022-09-14 RX ADMIN — Medication SCH ML: at 11:23

## 2022-09-14 RX ADMIN — INSULIN LISPRO SCH: 100 INJECTION, SOLUTION INTRAVENOUS; SUBCUTANEOUS at 23:56

## 2022-09-14 RX ADMIN — INSULIN LISPRO SCH: 100 INJECTION, SOLUTION INTRAVENOUS; SUBCUTANEOUS at 17:10

## 2022-09-14 RX ADMIN — SEVELAMER CARBONATE SCH: 800 TABLET, FILM COATED ORAL at 17:10

## 2022-09-14 RX ADMIN — Medication SCH ML: at 21:47

## 2022-09-14 RX ADMIN — SEVELAMER CARBONATE SCH: 800 TABLET, FILM COATED ORAL at 09:19

## 2022-09-14 RX ADMIN — INSULIN LISPRO SCH: 100 INJECTION, SOLUTION INTRAVENOUS; SUBCUTANEOUS at 09:19

## 2022-09-14 RX ADMIN — SEVELAMER CARBONATE SCH MG: 800 TABLET, FILM COATED ORAL at 11:22

## 2022-09-14 RX ADMIN — ASPIRIN SCH MG: 81 TABLET, COATED ORAL at 11:22

## 2022-09-14 NOTE — EVENT NOTE
Date: 09/14/22





Patient seen and examined at the bedside with RN


He remains confused and continued to refuse hemodialysis


Try to reach out his daughter but mailbox is full


I tried to call his son but that number is not excepting any phone calls


Discussed with nephrologist Dr. Madrigal and will plan to do hemodialysis with mild 

sedation with IV morphine and restrain as patient seems confused and does not 

have medical decision-making capacity at this point due to confusion.


Blood sugar dropped at 30 by fingerstick, ordered for D50


Continue to follow clinically





--It took me about 28 minutes to reevaluate and reasses this patient, discussed 

with RN/CM, review medical documents, lab results, imaging, medication list and 

placing order.

## 2022-09-14 NOTE — HISTORY AND PHYSICAL REPORT
History of Present Illness


Date of examination: 09/14/22


Date of admission: 


09/14/2022


Chief complaint: 





ESRD- on dialysis


History of present illness: 





51-year-old male with known history of end-stage renal disease on dialysis who 

was has not been quite compliant with his dialysis presents to the emergency 

room today with complaints of nausea and generalized weakness.  Last dialysis 

was on August 13, 2022.


He was encouraged by family members to report to the emergency room today for 

evaluation of possible dialysis.


Patient denies any fever or chills, no chest pain or shortness of breath, no 

headache or dizziness and no diaphoresis.





Work-up in the emergency room today, labs significant for potassium level of 

6.8, BUN of 1.2 and creatinine of 20.2.


EKG was unremarkable.





Patient has received insulin, glucose, Kayexalate, Toradol nebulizing treatments

and calcium gluconate in the emergency room.


Consult has been placed to the nephrologist for evaluation.





Past History


Past Medical History: diabetes, hypertension


Past Surgical History: Other (Dialysis access)


Social history: no significant social history


Family history: no significant family history





Medications and Allergies


                                    Allergies











Allergy/AdvReac Type Severity Reaction Status Date / Time


 


No Known Allergies Allergy   Verified 09/14/22 02:00











                                Home Medications











 Medication  Instructions  Recorded  Confirmed  Last Taken  Type


 


Aspirin EC [Halfprin EC] 81 mg PO QDAY #30 tablet. 08/25/22  Unknown Rx


 


Metoprolol [Lopressor TAB] 100 mg PO BID #60 tablet 08/25/22  Unknown Rx


 


Sevelamer Carbonate [Renvela] 2,400 mg PO AC #60 tablet 08/25/22  Unknown Rx


 


Zolpidem [Ambien] 5 mg PO QHS PRN #7 tablet 08/25/22  Unknown Rx


 


levETIRAcetam [Keppra TAB] 500 mg PO BID #60 tablet 08/25/22  Unknown Rx











Active Meds: 


Active Medications





Acetaminophen (Acetaminophen 325 Mg Tab)  650 mg PO Q4H PRN


   PRN Reason: Pain MILD(1-3)/Fever >100.5/HA


Dextrose (Dextrose 50% In Water (25gm) 50 Ml Syringe)  50 ml IV Q30MIN PRN; 

Protocol


   PRN Reason: Hypoglycemia


Dextrose (Dextrose 50% In Water (25gm) 50 Ml Syringe)  50 ml IV Q30MIN PRN; 

Protocol


   PRN Reason: Hypoglycemia


Insulin Human Lispro (Insulin Lispro 100 Unit/Ml)  0 unit SUB-Q ACHS [facility]; 

Protocol


Magnesium Hydroxide (Magnesium Hydroxide (Mom) Oral Liqd Udc)  30 ml PO Q4H PRN


   PRN Reason: Constipation


Morphine Sulfate (Morphine 2 Mg/1 Ml Inj)  2 mg IV Q4H PRN


   PRN Reason: Pain, Moderate (4-6)


Morphine Sulfate (Morphine 4 Mg/1 Ml Inj)  4 mg IV Q4H PRN


   PRN Reason: Pain , Severe (7-10)


Ondansetron HCl (Ondansetron 4 Mg/2 Ml Inj)  4 mg IV Q8H PRN


   PRN Reason: Nausea And Vomiting


Sodium Chloride (Sodium Chloride 0.9% 10 Ml Flush Syringe)  10 ml IV BID [facility]


Sodium Chloride (Sodium Chloride 0.9% 10 Ml Flush Syringe)  10 ml IV PRN PRN


   PRN Reason: LINE FLUSH











Review of Systems


All systems: negative (As stated in HPI)





Exam





- Constitutional


Vitals: 


                                        











Temp Pulse Resp BP Pulse Ox


 


 97.4 F L  80   18   154/62   100 


 


 09/13/22 22:47  09/13/22 22:47  09/13/22 22:47  09/13/22 22:47  09/13/22 22:47











General appearance: Present: no acute distress, mild distress, well-nourished





- EENT


Eyes: Present: PERRL, EOM intact.  Absent: scleral icterus


ENT: hearing intact, clear oral mucosa, dentition normal





- Neck


Neck: Present: supple, normal ROM





- Respiratory


Respiratory effort: normal


Respiratory: bilateral: CTA





- Cardiovascular


Rhythm: regular


Heart Sounds: Present: S1 & S2.  Absent: gallop, systolic murmur, diastolic 

murmur, rub, click





- Extremities


Extremities: no ischemia, pulses intact, pulses symmetrical, No edema, normal 

temperature, normal color, Full ROM


Peripheral Pulses: within normal limits





- Abdominal


General gastrointestinal: Present: soft, non-tender, non-distended, normal bowel

sounds.  Absent: mass





- Integumentary


Integumentary: Present: clear, warm, dry, normal turgor.  Absent: rash





- Musculoskeletal


Musculoskeletal: strength equal bilaterally





- Psychiatric


Psychiatric: appropriate mood/affect, intact judgment & insight, memory intact, 

cooperative





- Neurologic


Neurologic: CNII-XII intact, no focal deficits, moves all extremities





Results





- Labs


CBC & Chem 7: 


                                 09/13/22 22:48





                                 09/13/22 22:48


Labs: 


                              Abnormal lab results











  09/13/22 09/13/22 Range/Units





  22:48 22:48 


 


RBC  2.71 L   (3.65-5.03)  M/mm3


 


Hgb  8.4 L   (11.8-15.2)  gm/dl


 


Hct  25.5 L   (35.5-45.6)  %


 


RDW  16.1 H   (13.2-15.2)  %


 


Mono % (Auto)  9.5 H   (0.0-7.3)  %


 


Sodium   146 H  (137-145)  mmol/L


 


Potassium   6.8 H*  (3.6-5.0)  mmol/L


 


Chloride   94.7 L  ()  mmol/L


 


BUN   142 H  (9-20)  mg/dL


 


Creatinine   20.2 H  (0.8-1.3)  mg/dL


 


Glucose   187 H  ()  mg/dL


 


Albumin   3.8 L  (3.9-5)  g/dL














Assessment and Plan





Assessment:








1.  End-stage renal disease on dialysis





2.  Noncompliance  with dialysis





3.  Hyperkalemia





4.  Hypertension














Plan:





1.  Patient admitted and placed on telemetry.





2.  We will monitor chemistry





3.  Consult placed to nephrology for evaluation.





4.  We will resume routine medications.





DVT Prophylaxis: Subcutaneous heparin





CODE Status: Full code

## 2022-09-14 NOTE — CONSULTATION
History of Present Illness





- Reason for Consult


Consult date: 09/14/22


end stage renal disease





- History of Present Illness


This is a 51-year-old man with end-stage renal disease on hemodialysis with 

history of noncompliance who presented to the emergency department with 

generalized weakness and nausea.  Nephrology was consulted for ESRD management. 

Patient is confused and is not able to provide history which has been obtained 

from chart.








Past History


Past Medical History: diabetes, hypertension


Past Surgical History: Other (Dialysis access)


Social history: no significant social history


Family history: no significant family history





Medications and Allergies


                                    Allergies











Allergy/AdvReac Type Severity Reaction Status Date / Time


 


No Known Allergies Allergy   Verified 09/14/22 02:00











                                Home Medications











 Medication  Instructions  Recorded  Confirmed  Last Taken  Type


 


Aspirin EC [Halfprin EC] 81 mg PO QDAY #30 tablet. 08/25/22  Unknown Rx


 


Metoprolol [Lopressor TAB] 100 mg PO BID #60 tablet 08/25/22  Unknown Rx


 


Sevelamer Carbonate [Renvela] 2,400 mg PO AC #60 tablet 08/25/22  Unknown Rx


 


Zolpidem [Ambien] 5 mg PO QHS PRN #7 tablet 08/25/22  Unknown Rx


 


levETIRAcetam [Keppra TAB] 500 mg PO BID #60 tablet 08/25/22  Unknown Rx











Active Meds: 


Active Medications





Acetaminophen (Acetaminophen 325 Mg Tab)  650 mg PO Q4H PRN


   PRN Reason: Pain MILD(1-3)/Fever >100.5/HA


Aspirin (Aspirin Ec 81 Mg Tab)  81 mg PO QDAY [facility]


   Last Admin: 09/14/22 11:22 Dose:  81 mg


   


Dextrose (Dextrose 50% In Water (25gm) 50 Ml Syringe)  50 ml IV Q30MIN PRN; 

Protocol


   PRN Reason: Hypoglycemia


   Last Admin: 09/14/22 11:34 Dose:  50 ml


   


Sodium Chloride (Nacl 0.9%)  100 mls @ 999 mls/hr IV EUSEBIA PRN


   PRN Reason: Hypotension


Insulin Human Lispro (Insulin Lispro 100 Unit/Ml)  0 unit SUB-Q ACHS [facility]; 

Protocol


   Last Admin: 09/14/22 17:10 Dose:  Not Given


   


Levetiracetam (Levetiracetam 500 Mg Tab)  500 mg PO BID [facility]


   Last Admin: 09/14/22 11:23 Dose:  500 mg


   


Magnesium Hydroxide (Magnesium Hydroxide (Mom) Oral Liqd Udc)  30 ml PO Q4H PRN


   PRN Reason: Constipation


Metoclopramide HCl (Metoclopramide 10 Mg/2 Ml Inj)  10 mg IV Q6H PRN


   PRN Reason: Nausea And Vomiting


Metoprolol Tartrate (Metoprolol Tartrate 100 Mg Tab)  100 mg PO BID [facility]


   Last Admin: 09/14/22 11:23 Dose:  100 mg


   


Morphine Sulfate (Morphine 2 Mg/1 Ml Inj)  2 mg IV Q4H PRN


   PRN Reason: Pain, Moderate (4-6)


   Last Admin: 09/14/22 15:13 Dose:  2 mg


   


Morphine Sulfate (Morphine 4 Mg/1 Ml Inj)  4 mg IV Q4H PRN


   PRN Reason: Pain , Severe (7-10)


Ondansetron HCl (Ondansetron 4 Mg/2 Ml Inj)  4 mg IV Q8H PRN


   PRN Reason: Nausea And Vomiting


Sevelamer Carbonate (Sevelamer Carbonate 800 Mg Tab)  2,400 mg PO [facility]


   Last Admin: 09/14/22 17:10 Dose:  Not Given


   


Sodium Chloride (Sodium Chloride 0.9% 10 Ml Flush Syringe)  10 ml IV BID [facility]


   Last Admin: 09/14/22 11:23 Dose:  10 ml


   


Sodium Chloride (Sodium Chloride 0.9% 10 Ml Flush Syringe)  10 ml IV PRN PRN


   PRN Reason: LINE FLUSH


Zolpidem Tartrate (Zolpidem 5 Mg Tab)  5 mg PO QHS PRN


   PRN Reason: Sleep











Review of Systems


ROS unobtainable: due to mental status





Exam





- Vital Signs


Vital signs: 


                                   Vital Signs











Temp Pulse Resp BP Pulse Ox


 


 98.1 F   90   16   159/86   100 


 


 09/13/22 14:01  09/13/22 14:01  09/13/22 14:01  09/13/22 14:01  09/13/22 14:01














- Physical Exam


Narrative exam: 


Constitutional: no acute distress


Head: NC/AT


Neck: supple


Lungs: clear to auscultation


CV: RRR


Abdomen: soft, non-tender, bowel sounds present


Back: nontender


Extremities: no edema, pulses WNL


Skin: intact


Neuro: oriented only to self








Results





- Lab Results





                                 09/13/22 22:48





                                 09/14/22 14:42


                             Most recent lab results











Calcium  8.8 mg/dL (8.4-10.2)   09/14/22  14:42    














Assessment and Plan


End-stage renal disease on hemodialysis


Uremia


Hyperkalemia


Anemia of ESRD


Hyperphosphatemia


Hyperparathyroidism





Patient has history of dialysis noncompliance.  He now presents with uremia and 

severe hyperkalemia.  Patient is refusing dialysis at this time however he 

currently does not have capacity for medical decision making as he is uremic/

altered.  Multiple team members have tried to contact family, unfortunately have

not been able to get in touch.  Given that this is an emergency situation due to

the hyperkalemia, plan to go ahead with dialysis after restraints and mild 

sedation.  Dialysis nurse Mari advised to proceed with dialysis with close 

monitoring. 


Goal of care discussion with family once able to get in touch with them.


Status post medical management of hyperkalemia in the interim


Continue binders


Renally dose medications


ESRD diet with 1.2-1. 4 g/kg/d protein intake

## 2022-09-14 NOTE — EVENT NOTE
Date: 09/14/22


Case discussed with Dr Nevarez. Patient is uremic/altered and refusing HD. 

Patient may need to be restrained and then dialyzed, pending discussion with 

family.

## 2022-09-14 NOTE — XRAY REPORT
CHEST 1 VIEW 9/14/2022 10:22 PM



INDICATION / CLINICAL INFORMATION: ESRD.



COMPARISON: 8/13/2022



FINDINGS:



SUPPORT DEVICES: None.

HEART / MEDIASTINUM: No significant abnormality. 

LUNGS / PLEURA: Redemonstration of a nodular density measuring 2.7 x 2.0 cm within the left upper lob
e. There are mild streaky consolidations within the left mid and upper lung. No pneumothorax. 



ADDITIONAL FINDINGS: No significant additional findings.



IMPRESSION:

1. Redemonstration of a nodular density within the left upper lobe measuring up to 2.7 cm. Further ev
aluation with chest CT is recommended to evaluate for malignancy.



Signer Name: Vick Ashraf MD 

Signed: 9/14/2022 11:21 PM

Workstation Name: [email or web address]

## 2022-09-15 LAB
BASOPHILS # (AUTO): 0 K/MM3 (ref 0–0.1)
BASOPHILS NFR BLD AUTO: 0.5 % (ref 0–1.8)
BUN SERPL-MCNC: 63 MG/DL (ref 9–20)
BUN/CREAT SERPL: 6 %
CALCIUM SERPL-MCNC: 8.9 MG/DL (ref 8.4–10.2)
EOSINOPHIL # BLD AUTO: 0.1 K/MM3 (ref 0–0.4)
EOSINOPHIL NFR BLD AUTO: 1.8 % (ref 0–4.3)
HCT VFR BLD CALC: 26.5 % (ref 35.5–45.6)
HEMOLYSIS INDEX: 3
HGB BLD-MCNC: 8.6 GM/DL (ref 11.8–15.2)
LYMPHOCYTES # BLD AUTO: 1.2 K/MM3 (ref 1.2–5.4)
LYMPHOCYTES NFR BLD AUTO: 14.8 % (ref 13.4–35)
MCHC RBC AUTO-ENTMCNC: 32 % (ref 32–34)
MCV RBC AUTO: 95 FL (ref 84–94)
MONOCYTES # (AUTO): 0.7 K/MM3 (ref 0–0.8)
MONOCYTES % (AUTO): 8.4 % (ref 0–7.3)
PLATELET # BLD: 239 K/MM3 (ref 140–440)
RBC # BLD AUTO: 2.79 M/MM3 (ref 3.65–5.03)

## 2022-09-15 RX ADMIN — INSULIN LISPRO SCH: 100 INJECTION, SOLUTION INTRAVENOUS; SUBCUTANEOUS at 11:13

## 2022-09-15 RX ADMIN — Medication SCH ML: at 10:25

## 2022-09-15 RX ADMIN — SEVELAMER CARBONATE SCH: 800 TABLET, FILM COATED ORAL at 08:33

## 2022-09-15 RX ADMIN — INSULIN LISPRO SCH: 100 INJECTION, SOLUTION INTRAVENOUS; SUBCUTANEOUS at 16:14

## 2022-09-15 RX ADMIN — METOPROLOL TARTRATE SCH MG: 100 TABLET, FILM COATED ORAL at 21:44

## 2022-09-15 RX ADMIN — INSULIN LISPRO SCH: 100 INJECTION, SOLUTION INTRAVENOUS; SUBCUTANEOUS at 08:32

## 2022-09-15 RX ADMIN — SEVELAMER CARBONATE SCH: 800 TABLET, FILM COATED ORAL at 10:37

## 2022-09-15 RX ADMIN — ASPIRIN SCH MG: 81 TABLET, COATED ORAL at 10:25

## 2022-09-15 RX ADMIN — SEVELAMER CARBONATE SCH: 800 TABLET, FILM COATED ORAL at 17:54

## 2022-09-15 RX ADMIN — METOPROLOL TARTRATE SCH MG: 100 TABLET, FILM COATED ORAL at 10:25

## 2022-09-15 RX ADMIN — SEVELAMER CARBONATE SCH MG: 800 TABLET, FILM COATED ORAL at 10:24

## 2022-09-15 RX ADMIN — INSULIN LISPRO SCH: 100 INJECTION, SOLUTION INTRAVENOUS; SUBCUTANEOUS at 21:53

## 2022-09-15 RX ADMIN — Medication SCH ML: at 21:44

## 2022-09-15 NOTE — PROGRESS NOTE
Assessment and Plan


End-stage renal disease on hemodialysis


Uremia, resolved with HD


Hyperkalemia, controlled with HD


Anemia of ESRD


Hyperphosphatemia


Hyperparathyroidism





Patient's mental status has improved following dialysis yesterday.  Case was 

discussed in detail with him regarding continuation of dialysis.  He said that 

he would like to continue chronic treatment with dialysis going forward.  Plan 

for next session tomorrow.


Continue binders


Renally dose medications


ESRD diet with 1.2-1. 4 g/kg/d protein intake








Subjective


Date of service: 09/15/22


Principal diagnosis: Encephalopathy


Interval history: 


Resting in bed.  Alert today, mentation has improved.








Objective





- Exam


Narrative Exam: 


Constitutional: no acute distress


Head: NC/AT


Neck: supple


Lungs: clear to auscultation


CV: RRR


Abdomen: soft, non-tender, bowel sounds present


Back: nontender


Extremities: no edema, pulses WNL


Skin: intact


Neuro: oriented only to self








- Lab





                                 09/15/22 04:42





                                 09/15/22 04:42


                             Most recent lab results











Calcium  8.9 mg/dL (8.4-10.2)   09/15/22  04:42    














Medications & Allergies





- Medications


Allergies/Adverse Reactions: 


                                    Allergies





No Known Allergies Allergy (Verified 09/15/22 09:46)


   








Home Medications: 


                                Home Medications











 Medication  Instructions  Recorded  Confirmed  Last Taken  Type


 


Aspirin EC [Halfprin EC] 81 mg PO QDAY #30 tablet. 08/25/22 09/15/22 Unknown 

Rx


 


Sevelamer Carbonate [Renvela] 2,400 mg PO AC #60 tablet 08/25/22 09/15/22 

Unknown Rx


 


Zolpidem [Ambien] 5 mg PO QHS PRN #7 tablet 08/25/22 09/15/22 Unknown Rx


 


Metoprolol Tartrate [Lopressor] 100 mg PO BID 09/15/22 09/15/22 Unknown History


 


levETIRAcetam [Keppra TAB] 500 mg PO BID 09/15/22 09/15/22 Unknown History











Active Medications: 














Generic Name Dose Route Start Last Admin





  Trade Name Freq  PRN Reason Stop Dose Admin


 


Acetaminophen  650 mg  09/14/22 02:56 





  Acetaminophen 325 Mg Tab  PO  





  Q4H PRN  





  Pain MILD(1-3)/Fever >100.5/HA  


 


Aspirin  81 mg  09/14/22 10:00  09/15/22 10:25





  Aspirin Ec 81 Mg Tab  PO   81 mg





  QDAY [facility]   Administration


 


Dextrose  50 ml  09/14/22 02:56  09/14/22 11:34





  Dextrose 50% In Water (25gm) 50 Ml Syringe  IV   50 ml





  Q30MIN PRN   Administration





  Hypoglycemia  





  Protocol  


 


Sodium Chloride  100 mls @ 999 mls/hr  09/14/22 08:00 





  Nacl 0.9%  IV  





  EUSEBIA PRN  





  Hypotension  


 


Insulin Human Lispro  0 unit  09/14/22 07:30  09/15/22 16:14





  Insulin Lispro 100 Unit/Ml  SUB-Q   Not Given





  ACHS [facility]  





  Protocol  


 


Levetiracetam  500 mg  09/14/22 10:00  09/15/22 10:25





  Levetiracetam 500 Mg Tab  PO   500 mg





  BID [facility]   Administration


 


Magnesium Hydroxide  30 ml  09/14/22 02:56 





  Magnesium Hydroxide (Mom) Oral Liqd Udc  PO  





  Q4H PRN  





  Constipation  


 


Metoclopramide HCl  10 mg  09/14/22 18:00 





  Metoclopramide 10 Mg/2 Ml Inj  IV  





  Q6H PRN  





  Nausea And Vomiting  


 


Metoprolol Tartrate  100 mg  09/14/22 10:00  09/15/22 10:25





  Metoprolol Tartrate 100 Mg Tab  PO   100 mg





  BID [facility]   Administration


 


Morphine Sulfate  2 mg  09/14/22 02:56  09/14/22 15:13





  Morphine 2 Mg/1 Ml Inj  IV   2 mg





  Q4H PRN   Administration





  Pain, Moderate (4-6)  


 


Morphine Sulfate  4 mg  09/14/22 02:56 





  Morphine 4 Mg/1 Ml Inj  IV  





  Q4H PRN  





  Pain , Severe (7-10)  


 


Ondansetron HCl  4 mg  09/14/22 02:56 





  Ondansetron 4 Mg/2 Ml Inj  IV  





  Q8H PRN  





  Nausea And Vomiting  


 


Sevelamer Carbonate  2,400 mg  09/14/22 07:30  09/15/22 17:54





  Sevelamer Carbonate 800 Mg Tab  PO   Not Given





  AC [facility]  


 


Sodium Chloride  10 ml  09/14/22 10:00  09/15/22 10:25





  Sodium Chloride 0.9% 10 Ml Flush Syringe  IV   10 ml





  BID [facility]   Administration


 


Sodium Chloride  10 ml  09/14/22 02:56 





  Sodium Chloride 0.9% 10 Ml Flush Syringe  IV  





  PRN PRN  





  LINE FLUSH  


 


Zolpidem Tartrate  5 mg  09/14/22 22:00 





  Zolpidem 5 Mg Tab  PO  





  QHS PRN  





  Sleep

## 2022-09-15 NOTE — PROGRESS NOTE
Assessment and Plan








1.  End-stage renal disease on dialysis





2.  Noncompliance  with dialysis





3.  Hyperkalemia





4.  Hypertension





5.  Possible underlying dementia














Plan:





1.  Patient admitted and placed on telemetry.





2.  We will monitor chemistry





3.  Consult placed to nephrology for evaluation.





4.  We will resume routine medications.





DVT Prophylaxis: Subcutaneous heparin





CODE Status: Wife requested DNR and SNF placement with hospice








Subjective


Date of service: 09/15/22


Principal diagnosis: Encephalopathy





Objective





- Constitutional


Vitals: 


                               Vital Signs - 12hr











  09/15/22 09/15/22





  14:41 19:48


 


Temperature 98.3 F 97.9 F


 


Pulse Rate 83 85


 


Respiratory 18 16





Rate  


 


Blood Pressure 135/71 123/41


 


O2 Sat by Pulse 98 82 L





Oximetry  














- Labs


CBC & Chem 7: 


                                 09/15/22 04:42





                                 09/15/22 04:42


Labs: 


                              Abnormal lab results











  09/14/22 09/15/22 09/15/22 Range/Units





  21:56 04:42 04:42 


 


RBC   2.79 L   (3.65-5.03)  M/mm3


 


Hgb   8.6 L   (11.8-15.2)  gm/dl


 


Hct   26.5 L   (35.5-45.6)  %


 


MCV   95 H   (84-94)  fl


 


RDW   16.0 H   (13.2-15.2)  %


 


Mono % (Auto)   8.4 H   (0.0-7.3)  %


 


Seg Neutrophils %   74.5 H   (40.0-70.0)  %


 


Potassium    5.2 H  (3.6-5.0)  mmol/L


 


Chloride    96.3 L  ()  mmol/L


 


Carbon Dioxide    33 H  (22-30)  mmol/L


 


BUN    63 H  (9-20)  mg/dL


 


Creatinine    10.9 H  (0.8-1.3)  mg/dL


 


POC Glucose  133 H    ()  mg/dL














  09/15/22 09/15/22 09/15/22 Range/Units





  07:32 11:05 14:41 


 


RBC     (3.65-5.03)  M/mm3


 


Hgb     (11.8-15.2)  gm/dl


 


Hct     (35.5-45.6)  %


 


MCV     (84-94)  fl


 


RDW     (13.2-15.2)  %


 


Mono % (Auto)     (0.0-7.3)  %


 


Seg Neutrophils %     (40.0-70.0)  %


 


Potassium     (3.6-5.0)  mmol/L


 


Chloride     ()  mmol/L


 


Carbon Dioxide     (22-30)  mmol/L


 


BUN     (9-20)  mg/dL


 


Creatinine     (0.8-1.3)  mg/dL


 


POC Glucose  160 H  124 H  109 H  ()  mg/dL














  09/15/22 Range/Units





  20:37 


 


RBC   (3.65-5.03)  M/mm3


 


Hgb   (11.8-15.2)  gm/dl


 


Hct   (35.5-45.6)  %


 


MCV   (84-94)  fl


 


RDW   (13.2-15.2)  %


 


Mono % (Auto)   (0.0-7.3)  %


 


Seg Neutrophils %   (40.0-70.0)  %


 


Potassium   (3.6-5.0)  mmol/L


 


Chloride   ()  mmol/L


 


Carbon Dioxide   (22-30)  mmol/L


 


BUN   (9-20)  mg/dL


 


Creatinine   (0.8-1.3)  mg/dL


 


POC Glucose  116 H  ()  mg/dL

## 2022-09-15 NOTE — ELECTROCARDIOGRAPH REPORT
[facility]

                                       

Test Date:    2022               Test Time:    01:50:38

Pat Name:     FRED MITCHELL              Department:   

Patient ID:   [id number]          Room:         A470

Gender:       M                        Technician:   WHITNEY

:          1970               Requested By: BECKY HALL

Order Number: [id number]             Reading MD:   Jeremy Huff

                                 Measurements

Intervals                              Axis          

Rate:         87                       P:            79

NY:           175                      QRS:          66

QRSD:         79                       T:            96

QT:           438                                    

QTc:          528                                    

                           Interpretive Statements

Sinus rhythm

Nonspecific T abnormalities, lateral leads

Prolonged QT interval

Compared to ECG 2022 01:01:06

No significant change

Electronically Signed On 9- 17:29:09 EDT by Jeremy Huff

## 2022-09-16 PROCEDURE — 5A1D70Z PERFORMANCE OF URINARY FILTRATION, INTERMITTENT, LESS THAN 6 HOURS PER DAY: ICD-10-PCS | Performed by: INTERNAL MEDICINE

## 2022-09-16 RX ADMIN — METOPROLOL TARTRATE SCH: 100 TABLET, FILM COATED ORAL at 22:03

## 2022-09-16 RX ADMIN — Medication SCH: at 10:39

## 2022-09-16 RX ADMIN — SEVELAMER CARBONATE SCH MG: 800 TABLET, FILM COATED ORAL at 10:38

## 2022-09-16 RX ADMIN — INSULIN LISPRO SCH: 100 INJECTION, SOLUTION INTRAVENOUS; SUBCUTANEOUS at 07:30

## 2022-09-16 RX ADMIN — INSULIN LISPRO SCH: 100 INJECTION, SOLUTION INTRAVENOUS; SUBCUTANEOUS at 22:12

## 2022-09-16 RX ADMIN — Medication SCH ML: at 22:11

## 2022-09-16 RX ADMIN — METOPROLOL TARTRATE SCH MG: 100 TABLET, FILM COATED ORAL at 10:42

## 2022-09-16 RX ADMIN — ASPIRIN SCH MG: 81 TABLET, COATED ORAL at 10:38

## 2022-09-16 RX ADMIN — INSULIN LISPRO SCH: 100 INJECTION, SOLUTION INTRAVENOUS; SUBCUTANEOUS at 11:30

## 2022-09-16 RX ADMIN — INSULIN LISPRO SCH: 100 INJECTION, SOLUTION INTRAVENOUS; SUBCUTANEOUS at 18:05

## 2022-09-16 RX ADMIN — SEVELAMER CARBONATE SCH: 800 TABLET, FILM COATED ORAL at 11:10

## 2022-09-16 RX ADMIN — SEVELAMER CARBONATE SCH MG: 800 TABLET, FILM COATED ORAL at 18:05

## 2022-09-16 NOTE — PROGRESS NOTE
Assessment and Plan








1.  End-stage renal disease on dialysis





2.  Noncompliance  with dialysis





3.  Hyperkalemia





4.  Hypertension





5.  Possible underlying dementia














Plan:





1.  Patient admitted and placed on telemetry.





2.  We will monitor chemistry





3.  Consult placed to nephrology for evaluation.





4.  We will resume routine medications.





DVT Prophylaxis: Subcutaneous heparin





CODE Status: Wife requested DNR and SNF placement with hospice








Subjective


Date of service: 09/16/22


Principal diagnosis: Encephalopathy


Interval history: 


Patient seen and examined.  Medical records and medication list reviewed.  


No acute event overnight noted by the RN.  


Patient denies any chest pain or difficulty breathing.  


Patient is tolerating diet.  


Discussed plan of care at bedside with patient.











Objective





- Exam


Narrative Exam: 





GENERAL:  AAM  lying on bed appeared to be in no discomfort. 


HEENT: Normocephalic.  Atraumatic.  No conjunctival congestion or icterus. 

Patient has moist mucous membranes.


NECK: Supple.  Trachea midline.


CHEST/LUNGS: Clear to auscultated bilaterally, breathing nonlabored. No wheezes 

crackles or rhonchi.


HEART/CARDIOVASCULAR: Regular in rate and rhythm.  S1 and S2 positive.


ABDOMEN: Abdomen is soft, nontender.  Patient has normal bowel sounds.  


SKIN: There is no rash.  Warm and dry.


NEURO:  No focal motor deficit.  Follows command.


MUSCULOSKELETAL: No joint effusion or tenderness.


EXTRIMITY: No edema, no cyanosis or clubbing.


PSYCH:  Cooperative.





- Constitutional


Vitals: 


                               Vital Signs - 12hr











  09/16/22 09/16/22 09/16/22





  04:37 06:00 07:51


 


Temperature 97.4 F L  97.9 F


 


Pulse Rate 55 L  73


 


Respiratory 16  18





Rate   


 


Blood Pressure 134/57  143/72


 


O2 Sat by Pulse 39 L 90 100





Oximetry   














  09/16/22





  10:00


 


Temperature 


 


Pulse Rate 73


 


Respiratory 





Rate 


 


Blood Pressure 


 


O2 Sat by Pulse 





Oximetry 














- Labs


CBC & Chem 7: 


                                 09/30/22 05:16





                                 09/30/22 05:16


Labs: 


                              Abnormal lab results











  09/15/22 09/15/22 09/16/22 Range/Units





  14:41 20:37 07:51 


 


POC Glucose  109 H  116 H  69 L  ()  mg/dL

## 2022-09-16 NOTE — PROGRESS NOTE
Assessment and Plan


End-stage renal disease on hemodialysis


Uremia, resolved with HD


Hyperkalemia, controlled with HD


Anemia of ESRD


Hyperphosphatemia


Hyperparathyroidism





Patient's mental statusimproved following dialysis.  Case was discussed in 

detail with him regarding continuation of dialysis.  He said that he would like 

to continue chronic treatment with dialysis going forward.  Plan for next 

session today.  Continue HD MWF


Continue binders


Renally dose medications


ESRD diet with 1.2-1. 4 g/kg/d protein intake








Subjective


Date of service: 09/16/22


Principal diagnosis: Encephalopathy


Interval history: 


Resting in bed.  States he would like to proceed with dialysis today.








Objective





- Exam


Narrative Exam: 


Constitutional: no acute distress


Head: NC/AT


Neck: supple


Lungs: clear to auscultation


CV: RRR


Abdomen: soft, non-tender, bowel sounds present


Back: nontender


Extremities: no edema, pulses WNL


Skin: intact


Neuro: Awake and alert








- Vital Signs


Vital signs: 


                               Vital Signs - 12hr











  09/16/22 09/16/22 09/16/22





  07:51 10:00 11:30


 


Temperature 97.9 F  97.8 F


 


Pulse Rate 73 73 80


 


Respiratory 18  18





Rate   


 


Blood Pressure 143/72  147/71


 


O2 Sat by Pulse 100 96 92





Oximetry   














- Lab





                                 09/15/22 04:42





                                 09/15/22 04:42


                             Most recent lab results











Calcium  8.9 mg/dL (8.4-10.2)   09/15/22  04:42    














Medications & Allergies





- Medications


Allergies/Adverse Reactions: 


                                    Allergies





No Known Allergies Allergy (Verified 09/15/22 09:46)


   








Home Medications: 


                                Home Medications











 Medication  Instructions  Recorded  Confirmed  Last Taken  Type


 


Aspirin EC [Halfprin EC] 81 mg PO QDAY #30 tablet. 08/25/22 09/15/22 Unknown 

Rx


 


Sevelamer Carbonate [Renvela] 2,400 mg PO AC #60 tablet 08/25/22 09/15/22 [facility]

[facility]


 


Zolpidem [Ambien] 5 mg PO QHS PRN #7 tablet 08/25/22 09/15/22 Unknown Rx


 


Metoprolol Tartrate [Lopressor] 100 mg PO BID 09/15/22 09/15/22 Unknown History


 


levETIRAcetam [Keppra TAB] 500 mg PO BID 09/15/22 09/15/22 Unknown History











Active Medications: 














Generic Name Dose Route Start Last Admin





  Trade Name Freq  PRN Reason Stop Dose Admin


 


Acetaminophen  650 mg  09/14/22 02:56 





  Acetaminophen 325 Mg Tab  PO  





  Q4H PRN  





  Pain MILD(1-3)/Fever >100.5/HA  


 


Aspirin  81 mg  09/14/22 10:00  09/16/22 10:38





  Aspirin Ec 81 Mg Tab  PO   81 mg





  QDAY LASHA   Administration


 


Dextrose  50 ml  09/14/22 02:56  09/14/22 11:34





  Dextrose 50% In Water (25gm) 50 Ml Syringe  IV   50 ml





  Q30MIN PRN   Administration





  Hypoglycemia  





  Protocol  


 


Sodium Chloride  100 mls @ 999 mls/hr  09/14/22 08:00 





  Nacl 0.9%  IV  





  EUSEBIA PRN  





  Hypotension  


 


Insulin Human Lispro  0 unit  09/14/22 07:30  09/16/22 18:05





  Insulin Lispro 100 Unit/Ml  SUB-Q   Not Given





  [facility]  





  Protocol  


 


Levetiracetam  500 mg  09/14/22 10:00  09/16/22 10:38





  Levetiracetam 500 Mg Tab  PO   500 mg





  BID LASHA   Administration


 


Magnesium Hydroxide  30 ml  09/14/22 02:56 





  Magnesium Hydroxide (Mom) Oral Liqd Udc  PO  





  Q4H PRN  





  Constipation  


 


Metoclopramide HCl  10 mg  09/14/22 18:00 





  Metoclopramide 10 Mg/2 Ml Inj  IV  





  Q6H PRN  





  Nausea And Vomiting  


 


Metoprolol Tartrate  100 mg  09/14/22 10:00  09/16/22 10:42





  Metoprolol Tartrate 100 Mg Tab  PO   100 mg





  BID LASHA   Administration


 


Morphine Sulfate  2 mg  09/14/22 02:56  09/14/22 15:13





  Morphine 2 Mg/1 Ml Inj  IV   2 mg





  Q4H PRN   Administration





  Pain, Moderate (4-6)  


 


Morphine Sulfate  4 mg  09/14/22 02:56 





  Morphine 4 Mg/1 Ml Inj  IV  





  Q4H PRN  





  Pain , Severe (7-10)  


 


Ondansetron HCl  4 mg  09/14/22 02:56 





  Ondansetron 4 Mg/2 Ml Inj  IV  





  Q8H PRN  





  Nausea And Vomiting  


 


Sevelamer Carbonate  2,400 mg  09/14/22 07:30  09/16/22 18:05





  Sevelamer Carbonate 800 Mg Tab  PO   2,400 mg





  AC LASHA   Administration


 


Sodium Chloride  10 ml  09/14/22 10:00  09/16/22 10:39





  Sodium Chloride 0.9% 10 Ml Flush Syringe  IV   Not Given





  BID LASHA  


 


Sodium Chloride  10 ml  09/14/22 02:56 





  Sodium Chloride 0.9% 10 Ml Flush Syringe  IV  





  PRN PRN  





  LINE FLUSH  


 


Zolpidem Tartrate  5 mg  09/14/22 22:00 





  Zolpidem 5 Mg Tab  PO  





  QHS PRN  





  Sleep

## 2022-09-17 LAB
BASOPHILS # (AUTO): 0.1 K/MM3 (ref 0–0.1)
BASOPHILS NFR BLD AUTO: 0.7 % (ref 0–1.8)
BUN SERPL-MCNC: 64 MG/DL (ref 9–20)
BUN/CREAT SERPL: 6 %
CALCIUM SERPL-MCNC: 8.3 MG/DL (ref 8.4–10.2)
EOSINOPHIL # BLD AUTO: 0.1 K/MM3 (ref 0–0.4)
EOSINOPHIL NFR BLD AUTO: 1.6 % (ref 0–4.3)
HCT VFR BLD CALC: 23.3 % (ref 35.5–45.6)
HEMOLYSIS INDEX: 10
HGB BLD-MCNC: 7.9 GM/DL (ref 11.8–15.2)
LYMPHOCYTES # BLD AUTO: 1.4 K/MM3 (ref 1.2–5.4)
LYMPHOCYTES NFR BLD AUTO: 18 % (ref 13.4–35)
MCHC RBC AUTO-ENTMCNC: 34 % (ref 32–34)
MCV RBC AUTO: 93 FL (ref 84–94)
MONOCYTES # (AUTO): 0.8 K/MM3 (ref 0–0.8)
MONOCYTES % (AUTO): 10.7 % (ref 0–7.3)
PLATELET # BLD: 182 K/MM3 (ref 140–440)
RBC # BLD AUTO: 2.5 M/MM3 (ref 3.65–5.03)

## 2022-09-17 RX ADMIN — METOPROLOL TARTRATE SCH: 100 TABLET, FILM COATED ORAL at 10:00

## 2022-09-17 RX ADMIN — SEVELAMER CARBONATE SCH MG: 800 TABLET, FILM COATED ORAL at 16:25

## 2022-09-17 RX ADMIN — METOPROLOL TARTRATE SCH: 50 TABLET, FILM COATED ORAL at 22:20

## 2022-09-17 RX ADMIN — METOPROLOL TARTRATE SCH MG: 100 TABLET, FILM COATED ORAL at 08:46

## 2022-09-17 RX ADMIN — INSULIN LISPRO SCH: 100 INJECTION, SOLUTION INTRAVENOUS; SUBCUTANEOUS at 08:00

## 2022-09-17 RX ADMIN — ASPIRIN SCH MG: 81 TABLET, COATED ORAL at 08:46

## 2022-09-17 RX ADMIN — INSULIN LISPRO SCH: 100 INJECTION, SOLUTION INTRAVENOUS; SUBCUTANEOUS at 22:25

## 2022-09-17 RX ADMIN — SEVELAMER CARBONATE SCH: 800 TABLET, FILM COATED ORAL at 12:30

## 2022-09-17 RX ADMIN — Medication SCH ML: at 16:25

## 2022-09-17 RX ADMIN — ASPIRIN SCH: 81 TABLET, COATED ORAL at 10:00

## 2022-09-17 RX ADMIN — INSULIN LISPRO SCH: 100 INJECTION, SOLUTION INTRAVENOUS; SUBCUTANEOUS at 12:00

## 2022-09-17 RX ADMIN — INSULIN LISPRO SCH UNIT: 100 INJECTION, SOLUTION INTRAVENOUS; SUBCUTANEOUS at 16:27

## 2022-09-17 RX ADMIN — SEVELAMER CARBONATE SCH MG: 800 TABLET, FILM COATED ORAL at 08:46

## 2022-09-17 NOTE — PROGRESS NOTE
Assessment and Plan





1.  End-stage renal disease on dialysis


2.  Noncompliance  with dialysis


3.  Hyperkalemia


4.  Hypertension


5.  Possible underlying dementia











Plan:





1.  Patient admitted and placed on telemetry.





2.  We will monitor chemistry





3.  Consult placed to nephrology for evaluation and ESRD.





4.  We will resume routine medications.





DVT Prophylaxis: Subcutaneous heparin





CODE Status: Wife requested DNR and SNF placement with hospice


Pending placement











Subjective


Date of service: 09/17/22


Principal diagnosis: Encephalopathy


Interval history: 


Patient seen and examined.  Medical records and medication list reviewed.  


No acute event overnight noted by the RN.  


Patient denies any chest pain or difficulty breathing.  


Patient is tolerating diet.  


Discussed plan of care at bedside with patient.











Objective





- Exam


Narrative Exam: 





GENERAL:  AAM  lying on bed appeared to be in no discomfort. 


HEENT: Normocephalic.  Atraumatic.  No conjunctival congestion or icterus. 

Patient has moist mucous membranes.


NECK: Supple.  Trachea midline.


CHEST/LUNGS: Clear to auscultated bilaterally, breathing nonlabored. No wheezes 

crackles or rhonchi.


HEART/CARDIOVASCULAR: Regular in rate and rhythm.  S1 and S2 positive.


ABDOMEN: Abdomen is soft, nontender.  Patient has normal bowel sounds.  


SKIN: There is no rash.  Warm and dry.


NEURO:  No focal motor deficit.  Follows command.


MUSCULOSKELETAL: No joint effusion or tenderness.


EXTRIMITY: No edema, no cyanosis or clubbing.


PSYCH:  Cooperative.





- Constitutional


Vitals: 


                               Vital Signs - 12hr











  09/17/22 09/17/22 09/17/22





  00:05 03:51 06:00


 


Temperature 97.7 F 98.0 F 


 


Pulse Rate 77 88 


 


Respiratory 18 17 





Rate   


 


Respiratory   17





Rate [Penis]   


 


Respiratory   17





Rate [no pain]   


 


Blood Pressure  97/43 


 


Blood Pressure 107/55  





[Right]   


 


O2 Sat by Pulse 98 96 





Oximetry   














- Labs


CBC & Chem 7: 


                                 09/30/22 05:16





                                 09/30/22 05:16


Labs: 


                              Abnormal lab results











  09/16/22 09/16/22 Range/Units





  16:55 21:48 


 


POC Glucose  127 H  145 H  ()  mg/dL

## 2022-09-17 NOTE — PROGRESS NOTE
Assessment and Plan


End-stage renal disease on hemodialysis


Uremia, resolved with HD


Hyperkalemia, controlled with HD


Anemia of ESRD


Hyperphosphatemia


Hyperparathyroidism





Patient's mental status improved following dialysis.  Case was discussed in 

detail with him regarding continuation of dialysis.  He said that he would like 

to continue chronic treatment with dialysis going forward.  Continue HD MWF


Continue binders


Renally dose medications


ESRD diet with 1.2-1. 4 g/kg/d protein intake








Subjective


Date of service: 09/17/22


Principal diagnosis: Encephalopathy


Interval history: 


Resting in bed


Vitals, labs and I/os reviewed


Interdisciplinary notes and consults reviewed








Objective





- Exam


Narrative Exam: 


Constitutional: no acute distress


Head: NC/AT


Neck: supple


Lungs: clear to auscultation


CV: RRR


Abdomen: soft, non-tender, bowel sounds present


Back: nontender


Extremities: no edema, pulses WNL


Skin: intact


Neuro: Awake and alert








- Vital Signs


Vital signs: 


                               Vital Signs - 12hr











  09/17/22 09/17/22





  15:22 19:10


 


Temperature  97.8 F


 


Pulse Rate  91 H


 


Respiratory  18





Rate  


 


Blood Pressure  110/46


 


O2 Sat by Pulse 98 97





Oximetry  














- Lab





                                 09/17/22 20:59





                                 09/15/22 04:42


                             Most recent lab results











Calcium  8.9 mg/dL (8.4-10.2)   09/15/22  04:42    














Medications & Allergies





- Medications


Allergies/Adverse Reactions: 


                                    Allergies





No Known Allergies Allergy (Verified 09/15/22 09:46)


   








Home Medications: 


                                Home Medications











 Medication  Instructions  Recorded  Confirmed  Last Taken  Type


 


Aspirin EC [Halfprin EC] 81 mg PO QDAY #30 tablet. 08/25/22 09/15/22 Unknown 

Rx


 


Sevelamer Carbonate [Renvela] 2,400 mg PO AC #60 tablet 08/25/22 09/15/22 

Unknown Rx


 


Zolpidem [Ambien] 5 mg PO QHS PRN #7 tablet 08/25/22 09/15/22 Unknown Rx


 


Metoprolol Tartrate [Lopressor] 100 mg PO BID 09/15/22 09/15/22 Unknown History


 


levETIRAcetam [Keppra TAB] 500 mg PO BID 09/15/22 09/15/22 Unknown History











Active Medications: 














Generic Name Dose Route Start Last Admin





  Trade Name Freq  PRN Reason Stop Dose Admin


 


Acetaminophen  650 mg  09/14/22 02:56 





  Acetaminophen 325 Mg Tab  PO  





  Q4H PRN  





  Pain MILD(1-3)/Fever >100.5/HA  


 


Aspirin  81 mg  09/14/22 10:00  09/17/22 10:00





  Aspirin Ec 81 Mg Tab  PO   Not Given





  QDAY LASHA  


 


Dextrose  50 ml  09/14/22 02:56  09/14/22 11:34





  Dextrose 50% In Water (25gm) 50 Ml Syringe  IV   50 ml





  Q30MIN PRN   Administration





  Hypoglycemia  





  Protocol  


 


Sodium Chloride  100 mls @ 999 mls/hr  09/14/22 08:00 





  Nacl 0.9%  IV  





  EUSEBIA PRN  





  Hypotension  


 


Insulin Human Lispro  0 unit  09/14/22 07:30  09/17/22 16:27





  Insulin Lispro 100 Unit/Ml  SUB-Q   2 unit





  [facility]   Administration





  Protocol  


 


Levetiracetam  500 mg  09/14/22 10:00  09/17/22 10:00





  Levetiracetam 500 Mg Tab  PO   Not Given





  BID LASHA  


 


Magnesium Hydroxide  30 ml  09/14/22 02:56 





  Magnesium Hydroxide (Mom) Oral Liqd Udc  PO  





  Q4H PRN  





  Constipation  


 


Metoclopramide HCl  10 mg  09/14/22 18:00 





  Metoclopramide 10 Mg/2 Ml Inj  IV  





  Q6H PRN  





  Nausea And Vomiting  


 


Metoprolol Tartrate  50 mg  09/17/22 22:00 





  Metoprolol Tartrate 50 Mg Tab  PO  





  BID LASHA  


 


Morphine Sulfate  2 mg  09/14/22 02:56  09/14/22 15:13





  Morphine 2 Mg/1 Ml Inj  IV   2 mg





  Q4H PRN   Administration





  Pain, Moderate (4-6)  


 


Morphine Sulfate  4 mg  09/14/22 02:56 





  Morphine 4 Mg/1 Ml Inj  IV  





  Q4H PRN  





  Pain , Severe (7-10)  


 


Ondansetron HCl  4 mg  09/14/22 02:56 





  Ondansetron 4 Mg/2 Ml Inj  IV  





  Q8H PRN  





  Nausea And Vomiting  


 


Sevelamer Carbonate  2,400 mg  09/14/22 07:30  09/17/22 16:25





  Sevelamer Carbonate 800 Mg Tab  PO   2,400 mg





  AC LASHA   Administration


 


Sodium Chloride  10 ml  09/14/22 10:00  09/17/22 16:25





  Sodium Chloride 0.9% 10 Ml Flush Syringe  IV   10 ml





  BID LASHA   Administration


 


Sodium Chloride  10 ml  09/14/22 02:56 





  Sodium Chloride 0.9% 10 Ml Flush Syringe  IV  





  PRN PRN  





  LINE FLUSH  


 


Zolpidem Tartrate  5 mg  09/14/22 22:00 





  Zolpidem 5 Mg Tab  PO  





  QHS PRN  





  Sleep

## 2022-09-18 RX ADMIN — Medication SCH: at 12:02

## 2022-09-18 RX ADMIN — METOPROLOL TARTRATE SCH MG: 50 TABLET, FILM COATED ORAL at 12:01

## 2022-09-18 RX ADMIN — SEVELAMER CARBONATE SCH MG: 800 TABLET, FILM COATED ORAL at 12:01

## 2022-09-18 RX ADMIN — ASPIRIN SCH MG: 81 TABLET, COATED ORAL at 12:01

## 2022-09-18 RX ADMIN — Medication SCH: at 21:49

## 2022-09-18 RX ADMIN — INSULIN LISPRO SCH: 100 INJECTION, SOLUTION INTRAVENOUS; SUBCUTANEOUS at 08:16

## 2022-09-18 RX ADMIN — INSULIN LISPRO SCH: 100 INJECTION, SOLUTION INTRAVENOUS; SUBCUTANEOUS at 21:48

## 2022-09-18 RX ADMIN — Medication SCH: at 00:20

## 2022-09-18 RX ADMIN — INSULIN LISPRO SCH: 100 INJECTION, SOLUTION INTRAVENOUS; SUBCUTANEOUS at 17:12

## 2022-09-18 RX ADMIN — METOPROLOL TARTRATE SCH MG: 50 TABLET, FILM COATED ORAL at 21:47

## 2022-09-18 RX ADMIN — SEVELAMER CARBONATE SCH: 800 TABLET, FILM COATED ORAL at 17:00

## 2022-09-18 RX ADMIN — SEVELAMER CARBONATE SCH MG: 800 TABLET, FILM COATED ORAL at 07:58

## 2022-09-18 RX ADMIN — INSULIN LISPRO SCH: 100 INJECTION, SOLUTION INTRAVENOUS; SUBCUTANEOUS at 11:30

## 2022-09-18 NOTE — PROGRESS NOTE
Assessment and Plan


End-stage renal disease on hemodialysis


Uremia, resolved with HD


Hyperkalemia, controlled with HD


Anemia of ESRD


Hyperphosphatemia


Hyperparathyroidism





Patient's mental status improved following dialysis.  Case was discussed in 

detail with him regarding continuation of dialysis.  He said that he would like 

to continue chronic treatment with dialysis going forward.  Continue HD MWF


Continue binders


Renally dose medications


ESRD diet with 1.2-1. 4 g/kg/d protein intake








Subjective


Date of service: 09/18/22


Principal diagnosis: Encephalopathy


Interval history: 


Resting in bed


Vitals, labs and I/os reviewed


Interdisciplinary notes and consults reviewed








Objective





- Exam


Narrative Exam: 


Constitutional: no acute distress


Head: NC/AT


Neck: supple


Lungs: clear to auscultation


CV: RRR


Abdomen: soft, non-tender, bowel sounds present


Back: nontender


Extremities: no edema, pulses WNL


Skin: intact


Neuro: Awake and alert








- Vital Signs


Vital signs: 


                               Vital Signs - 12hr











  09/18/22 09/18/22 09/18/22





  07:54 09:50 11:19


 


Temperature 98.2 F  98.0 F


 


Pulse Rate 87  84


 


Respiratory 16  16





Rate   


 


Blood Pressure 121/68  119/53


 


O2 Sat by Pulse 96 98 96





Oximetry   














  09/18/22





  15:32


 


Temperature 97.9 F


 


Pulse Rate 81


 


Respiratory 16





Rate 


 


Blood Pressure 115/66


 


O2 Sat by Pulse 98





Oximetry 














- Lab





                                 09/17/22 20:59





                                 09/17/22 20:59


                             Most recent lab results











Calcium  8.3 mg/dL (8.4-10.2)  L  09/17/22  20:59    














Medications & Allergies





- Medications


Allergies/Adverse Reactions: 


                                    Allergies





No Known Allergies Allergy (Verified 09/15/22 09:46)


   








Home Medications: 


                                Home Medications











 Medication  Instructions  Recorded  Confirmed  Last Taken  Type


 


Aspirin EC [Halfprin EC] 81 mg PO QDAY #30 tablet. 08/25/22 09/15/22 Unknown 

Rx


 


Sevelamer Carbonate [Renvela] 2,400 mg PO AC #60 tablet 08/25/22 09/15/22 

Unknown Rx


 


Zolpidem [Ambien] 5 mg PO QHS PRN #7 tablet 08/25/22 09/15/22 Unknown Rx


 


Metoprolol Tartrate [Lopressor] 100 mg PO BID 09/15/22 09/15/22 Unknown History


 


levETIRAcetam [Keppra TAB] 500 mg PO BID 09/15/22 09/15/22 Unknown History











Active Medications: 














Generic Name Dose Route Start Last Admin





  Trade Name Freq  PRN Reason Stop Dose Admin


 


Acetaminophen  650 mg  09/14/22 02:56 





  Acetaminophen 325 Mg Tab  PO  





  Q4H PRN  





  Pain MILD(1-3)/Fever >100.5/HA  


 


Aspirin  81 mg  09/14/22 10:00  09/18/22 12:01





  Aspirin Ec 81 Mg Tab  PO   81 mg





  QDAY LASHA   Administration


 


Dextrose  50 ml  09/14/22 02:56  09/14/22 11:34





  Dextrose 50% In Water (25gm) 50 Ml Syringe  IV   50 ml





  Q30MIN PRN   Administration





  Hypoglycemia  





  Protocol  


 


Sodium Chloride  100 mls @ 999 mls/hr  09/14/22 08:00 





  Nacl 0.9%  IV  





  EUSEBIA PRN  





  Hypotension  


 


Insulin Human Lispro  0 unit  09/14/22 07:30  09/18/22 17:12





  Insulin Lispro 100 Unit/Ml  SUB-Q   Not Given





  [facility]  





  Protocol  


 


Levetiracetam  500 mg  09/14/22 10:00  09/18/22 12:01





  Levetiracetam 500 Mg Tab  PO   500 mg





  BID LASHA   Administration


 


Metoclopramide HCl  10 mg  09/14/22 18:00 





  Metoclopramide 10 Mg/2 Ml Inj  IV  





  Q6H PRN  





  Nausea And Vomiting  


 


Metoprolol Tartrate  50 mg  09/17/22 22:00  09/18/22 12:01





  Metoprolol Tartrate 50 Mg Tab  PO   50 mg





  BID LASHA   Administration


 


Morphine Sulfate  2 mg  09/14/22 02:56  09/14/22 15:13





  Morphine 2 Mg/1 Ml Inj  IV   2 mg





  Q4H PRN   Administration





  Pain, Moderate (4-6)  


 


Morphine Sulfate  4 mg  09/14/22 02:56 





  Morphine 4 Mg/1 Ml Inj  IV  





  Q4H PRN  





  Pain , Severe (7-10)  


 


Ondansetron HCl  4 mg  09/14/22 02:56 





  Ondansetron 4 Mg/2 Ml Inj  IV  





  Q8H PRN  





  Nausea And Vomiting  


 


Sevelamer Carbonate  2,400 mg  09/14/22 07:30  09/18/22 12:01





  Sevelamer Carbonate 800 Mg Tab  PO   2,400 mg





  AC LASHA   Administration


 


Sodium Chloride  10 ml  09/14/22 10:00  09/18/22 12:02





  Sodium Chloride 0.9% 10 Ml Flush Syringe  IV   Not Given





  BID LASHA  


 


Sodium Chloride  10 ml  09/14/22 02:56 





  Sodium Chloride 0.9% 10 Ml Flush Syringe  IV  





  PRN PRN  





  LINE FLUSH  


 


Zolpidem Tartrate  5 mg  09/14/22 22:00 





  Zolpidem 5 Mg Tab  PO  





  QHS PRN  





  Sleep

## 2022-09-18 NOTE — PROGRESS NOTE
Assessment and Plan








1.  End-stage renal disease on dialysis





2.  Noncompliance  with dialysis





3.  Hyperkalemia





4.  Hypertension





5.  Possible underlying dementia














Plan:





1.  Patient admitted and placed on telemetry.





2.  We will monitor chemistry





3.  Consult placed to nephrology for evaluation.





4.  We will resume routine medications.





DVT Prophylaxis: Subcutaneous heparin





CODE Status: Wife requested DNR and SNF placement with hospice








Subjective


Date of service: 09/18/22


Principal diagnosis: Encephalopathy


Interval history: 


Patient seen and examined.  Medical records and medication list reviewed.  


No acute event overnight noted by the RN.  


Patient denies any chest pain or difficulty breathing.  


Patient is tolerating diet.  


Discussed plan of care at bedside with patient.











Objective





- Exam


Narrative Exam: 





GENERAL:  AAM  lying on bed appeared to be in no discomfort. 


HEENT: Normocephalic.  Atraumatic.  No conjunctival congestion or icterus. 

Patient has moist mucous membranes.


NECK: Supple.  Trachea midline.


CHEST/LUNGS: Clear to auscultated bilaterally, breathing nonlabored. No wheezes 

crackles or rhonchi.


HEART/CARDIOVASCULAR: Regular in rate and rhythm.  S1 and S2 positive.


ABDOMEN: Abdomen is soft, nontender.  Patient has normal bowel sounds.  


SKIN: There is no rash.  Warm and dry.


NEURO:  No focal motor deficit.  Follows command.


MUSCULOSKELETAL: No joint effusion or tenderness.


EXTRIMITY: No edema, no cyanosis or clubbing.


PSYCH:  Cooperative.





- Constitutional


Vitals: 


                               Vital Signs - 12hr











  09/18/22 09/18/22 09/18/22





  04:00 04:17 07:54


 


Temperature   98.2 F


 


Pulse Rate 82 81 87


 


Respiratory 18  16





Rate   


 


Blood Pressure   121/68


 


Blood Pressure 126/54  





[Right]   


 


O2 Sat by Pulse 92  96





Oximetry   














  09/18/22 09/18/22





  09:50 11:19


 


Temperature  98.0 F


 


Pulse Rate  84


 


Respiratory  16





Rate  


 


Blood Pressure  119/53


 


Blood Pressure  





[Right]  


 


O2 Sat by Pulse 98 96





Oximetry  














- Labs


CBC & Chem 7: 


                                 09/30/22 05:16





                                 09/30/22 05:16


Labs: 


                              Abnormal lab results











  09/17/22 09/17/22 09/17/22 Range/Units





  11:23 15:14 20:00 


 


RBC     (3.65-5.03)  M/mm3


 


Hgb     (11.8-15.2)  gm/dl


 


Hct     (35.5-45.6)  %


 


RDW     (13.2-15.2)  %


 


Mono % (Auto)     (0.0-7.3)  %


 


Potassium     (3.6-5.0)  mmol/L


 


Chloride     ()  mmol/L


 


Carbon Dioxide     (22-30)  mmol/L


 


BUN     (9-20)  mg/dL


 


Creatinine     (0.8-1.3)  mg/dL


 


Glucose     ()  mg/dL


 


POC Glucose  145 H  193 H  107 H  ()  mg/dL


 


Calcium     (8.4-10.2)  mg/dL














  09/17/22 09/17/22 Range/Units





  20:59 20:59 


 


RBC  2.50 L   (3.65-5.03)  M/mm3


 


Hgb  7.9 L   (11.8-15.2)  gm/dl


 


Hct  23.3 L   (35.5-45.6)  %


 


RDW  15.6 H   (13.2-15.2)  %


 


Mono % (Auto)  10.7 H   (0.0-7.3)  %


 


Potassium   5.5 H  (3.6-5.0)  mmol/L


 


Chloride   91.0 L  ()  mmol/L


 


Carbon Dioxide   37 H  (22-30)  mmol/L


 


BUN   64 H  (9-20)  mg/dL


 


Creatinine   9.9 H  (0.8-1.3)  mg/dL


 


Glucose   103 H  ()  mg/dL


 


POC Glucose    ()  mg/dL


 


Calcium   8.3 L  (8.4-10.2)  mg/dL

## 2022-09-19 RX ADMIN — SEVELAMER CARBONATE SCH: 800 TABLET, FILM COATED ORAL at 10:41

## 2022-09-19 RX ADMIN — INSULIN LISPRO SCH: 100 INJECTION, SOLUTION INTRAVENOUS; SUBCUTANEOUS at 10:41

## 2022-09-19 RX ADMIN — ASPIRIN SCH MG: 81 TABLET, COATED ORAL at 10:38

## 2022-09-19 RX ADMIN — SEVELAMER CARBONATE SCH MG: 800 TABLET, FILM COATED ORAL at 10:38

## 2022-09-19 RX ADMIN — Medication SCH ML: at 10:39

## 2022-09-19 RX ADMIN — METOPROLOL TARTRATE SCH MG: 50 TABLET, FILM COATED ORAL at 22:45

## 2022-09-19 RX ADMIN — INSULIN LISPRO SCH: 100 INJECTION, SOLUTION INTRAVENOUS; SUBCUTANEOUS at 12:58

## 2022-09-19 RX ADMIN — METOPROLOL TARTRATE SCH MG: 50 TABLET, FILM COATED ORAL at 10:38

## 2022-09-19 RX ADMIN — Medication SCH ML: at 22:45

## 2022-09-19 NOTE — PROGRESS NOTE
Assessment and Plan





End-stage renal disease on hemodialysis


Uremia, resolved with HD


Hyperkalemia, controlled with HD


Anemia of ESRD


Hyperphosphatemia


Hyperparathyroidism





Patient's mental status improved following dialysis.  Case has been discussed in

detail with him regarding continuation of dialysis.  He said that he would like 

to continue chronic treatment with dialysis going forward.  Continue HD MWF or 

prn


However patient refused HD today in afternoon, attempt again tomorrow


Continue binders


Renally dose medications


ESRD diet with 1.2-1. 4 g/kg/d protein intake





Subjective


Date of service: 09/19/22


Principal diagnosis: Encephalopathy


Interval history: 





Resting in bed, disheveled appearing


Vitals, labs and I/os reviewed


Interdisciplinary notes and consults reviewed





Objective





- Exam


Narrative Exam: 





Constitutional: no acute distress


Head: NC/AT


Neck: supple


Lungs: clear to auscultation


CV: RRR


Abdomen: soft, non-tender, bowel sounds present


Back: nontender


Extremities: no edema, pulses WNL


Skin: intact


Neuro: Awake and alert





- Vital Signs


Vital signs: 


                               Vital Signs - 12hr











  09/19/22 09/19/22 09/19/22





  07:38 10:00 11:57


 


Temperature 98.3 F  98.3 F


 


Pulse Rate 89 89 78


 


Blood Pressure 135/69  134/65


 


O2 Sat by Pulse 100 98 99





Oximetry   














- Lab





                                 09/17/22 20:59





                                 09/17/22 20:59


                             Most recent lab results











Calcium  8.3 mg/dL (8.4-10.2)  L  09/17/22  20:59    














Medications & Allergies





- Medications


Allergies/Adverse Reactions: 


                                    Allergies





No Known Allergies Allergy (Verified 09/15/22 09:46)


   








Home Medications: 


                                Home Medications











 Medication  Instructions  Recorded  Confirmed  Last Taken  Type


 


Aspirin EC [Halfprin EC] 81 mg PO QDAY #30 tablet. 08/25/22 09/15/22 Unknown 

Rx


 


Sevelamer Carbonate [Renvela] 2,400 mg PO AC #60 tablet 08/25/22 09/15/22 

Unknown Rx


 


Zolpidem [Ambien] 5 mg PO QHS PRN #7 tablet 08/25/22 09/15/22 Unknown Rx


 


Metoprolol Tartrate [Lopressor] 100 mg PO BID 09/15/22 09/15/22 Unknown History


 


levETIRAcetam [Keppra TAB] 500 mg PO BID 09/15/22 09/15/22 Unknown History











Active Medications: 














Generic Name Dose Route Start Last Admin





  Trade Name Freq  PRN Reason Stop Dose Admin


 


Acetaminophen  650 mg  09/14/22 02:56 





  Acetaminophen 325 Mg Tab  PO  





  Q4H PRN  





  Pain MILD(1-3)/Fever >100.5/HA  


 


Aspirin  81 mg  09/14/22 10:00  09/19/22 10:38





  Aspirin Ec 81 Mg Tab  PO   81 mg





  QDAY LASHA   Administration


 


Dextrose  50 ml  09/14/22 02:56  09/14/22 11:34





  Dextrose 50% In Water (25gm) 50 Ml Syringe  IV   50 ml





  Q30MIN PRN   Administration





  Hypoglycemia  





  Protocol  


 


Sodium Chloride  100 mls @ 999 mls/hr  09/14/22 08:00 





  Nacl 0.9%  IV  





  EUSEBIA PRN  





  Hypotension  


 


Insulin Human Lispro  0 unit  09/14/22 07:30  09/19/22 12:58





  Insulin Lispro 100 Unit/Ml  SUB-Q   Not Given





  [facility]  





  Protocol  


 


Levetiracetam  500 mg  09/14/22 10:00  09/19/22 10:38





  Levetiracetam 500 Mg Tab  PO   500 mg





  BID LASHA   Administration


 


Metoclopramide HCl  10 mg  09/14/22 18:00 





  Metoclopramide 10 Mg/2 Ml Inj  IV  





  Q6H PRN  





  Nausea And Vomiting  


 


Metoprolol Tartrate  50 mg  09/17/22 22:00  09/19/22 10:38





  Metoprolol Tartrate 50 Mg Tab  PO   50 mg





  BID LASHA   Administration


 


Morphine Sulfate  2 mg  09/14/22 02:56  09/14/22 15:13





  Morphine 2 Mg/1 Ml Inj  IV   2 mg





  Q4H PRN   Administration





  Pain, Moderate (4-6)  


 


Morphine Sulfate  4 mg  09/14/22 02:56 





  Morphine 4 Mg/1 Ml Inj  IV  





  Q4H PRN  





  Pain , Severe (7-10)  


 


Ondansetron HCl  4 mg  09/14/22 02:56 





  Ondansetron 4 Mg/2 Ml Inj  IV  





  Q8H PRN  





  Nausea And Vomiting  


 


Sevelamer Carbonate  2,400 mg  09/14/22 07:30  09/19/22 10:41





  Sevelamer Carbonate 800 Mg Tab  PO   Not Given





  AC LASHA  


 


Sodium Chloride  10 ml  09/14/22 10:00  09/19/22 10:39





  Sodium Chloride 0.9% 10 Ml Flush Syringe  IV   10 ml





  BID LASHA   Administration


 


Sodium Chloride  10 ml  09/14/22 02:56 





  Sodium Chloride 0.9% 10 Ml Flush Syringe  IV  





  PRN PRN  





  LINE FLUSH  


 


Zolpidem Tartrate  5 mg  09/14/22 22:00 





  Zolpidem 5 Mg Tab  PO  





  [facility]HS PRN  





  Sleep

## 2022-09-19 NOTE — PROGRESS NOTE
Assessment and Plan





1.  End-stage renal disease on dialysis


2.  Noncompliance  with dialysis


3.  Hyperkalemia


4.  Hypertension


5.  Possible underlying dementia











Plan:





1.  Patient admitted and placed on telemetry.





2.  We will monitor chemistry





3.  Consult placed to nephrology for evaluation and ESRD.





4.  We will resume routine medications.





DVT Prophylaxis: Subcutaneous heparin





CODE Status: Wife requested DNR and SNF placement with hospice


Pending placement











Subjective


Date of service: 09/19/22


Principal diagnosis: Encephalopathy


Interval history: 


Patient seen and examined.  Medical records and medication list reviewed.  


No acute event overnight noted by the RN.  


Patient denies any chest pain or difficulty breathing.  


Patient is tolerating diet.  


Discussed plan of care at bedside with patient.











Objective





- Constitutional


Vitals: 


                               Vital Signs - 12hr











  09/19/22 09/19/22 09/19/22





  03:52 07:38 10:00


 


Temperature 97.7 F 98.3 F 


 


Pulse Rate 87 89 89


 


Respiratory 17  





Rate   


 


Blood Pressure 144/79 135/69 


 


O2 Sat by Pulse 99 100 98





Oximetry   














  09/19/22





  11:57


 


Temperature 98.3 F


 


Pulse Rate 78


 


Respiratory 





Rate 


 


Blood Pressure 134/65


 


O2 Sat by Pulse 99





Oximetry 











General appearance: Present: no acute distress, other (malnourished)





- EENT


Eyes: PERRL, EOM intact


ENT: hearing intact, clear oral mucosa


Ears: bilateral: normal





- Neck


Neck: supple, normal ROM





- Respiratory


Respiratory effort: normal


Respiratory: bilateral: CTA





- Breasts


Breasts: normal





- Cardiovascular


Rhythm: regular


Heart Sounds: Present: S1 & S2.  Absent: gallop, rub


Extremities: pulses intact, No edema, normal color, Full ROM





- Gastrointestinal


General gastrointestinal: Present: soft, non-tender, non-distended, normal bowel

sounds





- Integumentary


Integumentary: clear, warm, dry





- Musculoskeletal


Musculoskeletal: 1, strength equal bilaterally





- Neurologic


Neurologic: moves all extremities





- Psychiatric


Psychiatric: no intact judgment & insight, no memory intact, cooperative





- Labs


CBC & Chem 7: 


                                 09/17/22 20:59





                                 09/17/22 20:59


Labs: 


                              Abnormal lab results











  09/18/22 09/18/22 Range/Units





  15:28 20:36 


 


POC Glucose  115 H  146 H  ()  mg/dL

## 2022-09-20 RX ADMIN — SEVELAMER CARBONATE SCH: 800 TABLET, FILM COATED ORAL at 19:30

## 2022-09-20 RX ADMIN — INSULIN LISPRO SCH: 100 INJECTION, SOLUTION INTRAVENOUS; SUBCUTANEOUS at 06:57

## 2022-09-20 RX ADMIN — Medication SCH ML: at 11:31

## 2022-09-20 RX ADMIN — METOPROLOL TARTRATE SCH MG: 50 TABLET, FILM COATED ORAL at 22:33

## 2022-09-20 RX ADMIN — Medication SCH: at 22:00

## 2022-09-20 RX ADMIN — SEVELAMER CARBONATE SCH MG: 800 TABLET, FILM COATED ORAL at 11:30

## 2022-09-20 RX ADMIN — SEVELAMER CARBONATE SCH: 800 TABLET, FILM COATED ORAL at 06:57

## 2022-09-20 RX ADMIN — INSULIN LISPRO SCH: 100 INJECTION, SOLUTION INTRAVENOUS; SUBCUTANEOUS at 11:31

## 2022-09-20 RX ADMIN — INSULIN LISPRO SCH UNIT: 100 INJECTION, SOLUTION INTRAVENOUS; SUBCUTANEOUS at 22:34

## 2022-09-20 RX ADMIN — SEVELAMER CARBONATE SCH: 800 TABLET, FILM COATED ORAL at 11:31

## 2022-09-20 RX ADMIN — METOPROLOL TARTRATE SCH MG: 50 TABLET, FILM COATED ORAL at 11:30

## 2022-09-20 RX ADMIN — INSULIN LISPRO SCH: 100 INJECTION, SOLUTION INTRAVENOUS; SUBCUTANEOUS at 19:30

## 2022-09-20 RX ADMIN — ASPIRIN SCH MG: 81 TABLET, COATED ORAL at 11:30

## 2022-09-20 NOTE — PROGRESS NOTE
Assessment and Plan


Assessment and plan: 


1.  End-stage renal disease on dialysis


2.  Noncompliance  with dialysis


3.  Hyperkalemia


4.  Hypertension


5.  Possible underlying dementia











Plan:





1.  Patient admitted and placed on telemetry.





2.  We will monitor chemistry





3.  Consult placed to nephrology for evaluation and ESRD.





4.  We will resume routine medications.





DVT Prophylaxis: Subcutaneous heparin





CODE Status: Wife requested DNR and SNF placement with hospice


Pending placement





Closely monitor the patient and adjust the management as needed


DC planning per case management


SNF placement with hospice


Pending authorization from insurance











History


Interval history: 


I have seen and examined the patient at the bedside


No new complaints


Patient is emaciated and cachectic


Awaiting SNF placement with hospice


Vital signs reviewed








Hospitalist Physical





- Constitutional


Vitals: 


                                        











Temp Pulse Resp BP Pulse Ox


 


 97.9 F   83   16   143/76   100 


 


 09/20/22 04:12  09/20/22 00:50  09/20/22 04:12  09/20/22 04:12  09/20/22 00:50











General appearance: Present: no acute distress, cachectic, disheveled, other 

(malnourished)





- EENT


Eyes: Present: PERRL, EOM intact





- Neck


Neck: Present: supple, normal ROM





- Respiratory


Respiratory effort: normal


Respiratory: bilateral: diminished, negative: rales, rhonchi, wheezing





- Cardiovascular


Rhythm: regular


Heart Sounds: Present: S1 & S2





- Extremities


Extremities: no ischemia, No edema





- Abdominal


General gastrointestinal: soft, non-tender, non-distended, normal bowel sounds





- Integumentary


Integumentary: Present: clear, warm





- Psychiatric


Psychiatric: appropriate mood/affect, other (Confused at times)





- Neurologic


Neurologic: moves all extremities





Results





- Labs


CBC & Chem 7: 


                                 09/17/22 20:59





                                 09/17/22 20:59


Labs: 


                             Laboratory Last Values











WBC  7.8 K/mm3 (4.5-11.0)   09/17/22  20:59    


 


RBC  2.50 M/mm3 (3.65-5.03)  L  09/17/22  20:59    


 


Hgb  7.9 gm/dl (11.8-15.2)  L  09/17/22  20:59    


 


Hct  23.3 % (35.5-45.6)  L  09/17/22  20:59    


 


MCV  93 fl (84-94)   09/17/22  20:59    


 


MCH  32 pg (28-32)   09/17/22  20:59    


 


MCHC  34 % (32-34)   09/17/22  20:59    


 


RDW  15.6 % (13.2-15.2)  H  09/17/22  20:59    


 


Plt Count  182 K/mm3 (140-440)   09/17/22  20:59    


 


Lymph % (Auto)  18.0 % (13.4-35.0)   09/17/22  20:59    


 


Mono % (Auto)  10.7 % (0.0-7.3)  H  09/17/22  20:59    


 


Eos % (Auto)  1.6 % (0.0-4.3)   09/17/22  20:59    


 


Baso % (Auto)  0.7 % (0.0-1.8)   09/17/22  20:59    


 


Lymph # (Auto)  1.4 K/mm3 (1.2-5.4)   09/17/22  20:59    


 


Mono # (Auto)  0.8 K/mm3 (0.0-0.8)   09/17/22  20:59    


 


Eos # (Auto)  0.1 K/mm3 (0.0-0.4)   09/17/22  20:59    


 


Baso # (Auto)  0.1 K/mm3 (0.0-0.1)   09/17/22  20:59    


 


Seg Neutrophils %  69.0 % (40.0-70.0)   09/17/22  20:59    


 


Seg Neutrophils #  5.4 K/mm3 (1.8-7.7)   09/17/22  20:59    


 


Sodium  140 mmol/L (137-145)   09/17/22  20:59    


 


Potassium  5.5 mmol/L (3.6-5.0)  H  09/17/22  20:59    


 


Chloride  91.0 mmol/L ()  L  09/17/22  20:59    


 


Carbon Dioxide  37 mmol/L (22-30)  H  09/17/22  20:59    


 


Anion Gap  18 mmol/L  09/17/22  20:59    


 


BUN  64 mg/dL (9-20)  H  09/17/22  20:59    


 


Creatinine  9.9 mg/dL (0.8-1.3)  H  09/17/22  20:59    


 


Estimated GFR  7 ml/min  09/17/22  20:59    


 


BUN/Creatinine Ratio  6 %  09/17/22  20:59    


 


Glucose  103 mg/dL ()  H  09/17/22  20:59    


 


POC Glucose  52 mg/dL ()  L  09/20/22  07:43    


 


Calcium  8.3 mg/dL (8.4-10.2)  L  09/17/22  20:59    


 


Total Bilirubin  0.30 mg/dL (0.1-1.2)   09/13/22  22:48    


 


AST  12 units/L (5-40)   09/13/22  22:48    


 


ALT  26 units/L (7-56)   09/13/22  22:48    


 


Alkaline Phosphatase  122 units/L ()   09/13/22  22:48    


 


Total Protein  6.9 g/dL (6.3-8.2)   09/13/22  22:48    


 


Albumin  3.8 g/dL (3.9-5)  L  09/13/22  22:48    


 


Albumin/Globulin Ratio  1.2 %  09/13/22  22:48    


 


Coronavirus (PCR)  Negative  (Negative)   09/16/22  10:00    


 


SARS-CoV-2 (PCR)  Negative  (Negative)   09/14/22  13:11    


 


Hepatitis A IgM Ab  Non-reactive  (NonReactive)   09/14/22  08:31    


 


Hep Bs Antigen  Non-reactive  (Negative)   09/14/22  08:31    


 


Hep B Core IgM Ab  Non-reactive  (NonReactive)   09/14/22  08:31    


 


Hepatitis C Antibody  Non-reactive  (NonReactive)   09/14/22  08:31    











Petty/IV: 


                                        





Voiding Method                   Toilet











Active Medications





- Current Medications


Current Medications: 














Generic Name Dose Route Start Last Admin





  Trade Name Freq  PRN Reason Stop Dose Admin


 


Acetaminophen  650 mg  09/14/22 02:56 





  Acetaminophen 325 Mg Tab  PO  





  Q4H PRN  





  Pain MILD(1-3)/Fever >100.5/HA  


 


Aspirin  81 mg  09/14/22 10:00  09/19/22 10:38





  Aspirin Ec 81 Mg Tab  PO   81 mg





  QDAY LASHA   Administration


 


Dextrose  50 ml  09/14/22 02:56  09/14/22 11:34





  Dextrose 50% In Water (25gm) 50 Ml Syringe  IV   50 ml





  Q30MIN PRN   Administration





  Hypoglycemia  





  Protocol  


 


Sodium Chloride  100 mls @ 999 mls/hr  09/14/22 08:00 





  Nacl 0.9%  IV  





  EUSEBIA PRN  





  Hypotension  


 


Insulin Human Lispro  0 unit  09/14/22 07:30  09/20/22 06:57





  Insulin Lispro 100 Unit/Ml  SUB-Q   Not Given





  [facility]  





  Protocol  


 


Levetiracetam  500 mg  09/14/22 10:00  09/19/22 22:45





  Levetiracetam 500 Mg Tab  PO   500 mg





  BID LASHA   Administration


 


Metoclopramide HCl  10 mg  09/14/22 18:00 





  Metoclopramide 10 Mg/2 Ml Inj  IV  





  Q6H PRN  





  Nausea And Vomiting  


 


Metoprolol Tartrate  50 mg  09/17/22 22:00  09/19/22 22:45





  Metoprolol Tartrate 50 Mg Tab  PO   50 mg





  BID LASHA   Administration


 


Morphine Sulfate  2 mg  09/14/22 02:56  09/14/22 15:13





  Morphine 2 Mg/1 Ml Inj  IV   2 mg





  Q4H PRN   Administration





  Pain, Moderate (4-6)  


 


Morphine Sulfate  4 mg  09/14/22 02:56 





  Morphine 4 Mg/1 Ml Inj  IV  





  Q4H PRN  





  Pain , Severe (7-10)  


 


Ondansetron HCl  4 mg  09/14/22 02:56 





  Ondansetron 4 Mg/2 Ml Inj  IV  





  Q8H PRN  





  Nausea And Vomiting  


 


Sevelamer Carbonate  2,400 mg  09/14/22 07:30  09/20/22 06:57





  Sevelamer Carbonate 800 Mg Tab  PO   Not Given





  [facility]  


 


Sodium Chloride  10 ml  09/14/22 10:00  09/19/22 22:45





  Sodium Chloride 0.9% 10 Ml Flush Syringe  IV   10 ml





  BID LASHA   Administration


 


Sodium Chloride  10 ml  09/14/22 02:56 





  Sodium Chloride 0.9% 10 Ml Flush Syringe  IV  





  PRN PRN  





  LINE FLUSH  


 


Zolpidem Tartrate  5 mg  09/14/22 22:00 





  Zolpidem 5 Mg Tab  PO  





  QHS PRN  





  Sleep  














Nutrition/Malnutrition Assess





- Dietary Evaluation


Nutrition/Malnutrition Findings: 


                                        





Nutrition Notes                                            Start:  09/15/22 

10:48


Freq:                                                      Status: Active       




Protocol:                                                                       




 Document     09/15/22 10:48  GLORIA  (Rec: 09/15/22 11:16  GLORIA  [id number])


 Nutrition Notes


     Need for Assessment generated from:         Low BMI


     Initial or Follow up                        Assessment


     Current Diagnosis                           CKD (stage V CKD),Diabetes,


                                                 Hypertension


     Other Pertinent Diagnosis                   AMS, ESRD+HD, Hyperkalemia,


                                                 Hyperphosphatemia, Uremia,


                                                 Anemia, ...


     Current Diet                                Renal Diet (since B 09/14), D


                                                 Suppl (from D 09/15).


     Labs/Tests                                  09/15: K 5.2, Cl 96.3, CO2 33,


                                                 BUN 63, Crea 10.9.


     Pertinent Medications                       09/15: Renvela.


     Height                                      5 ft 7 in


     Weight                                      53 kg


     Ideal Body Weight (kg)                      67.27


     BMI                                         18.3


     Intake Prior to Admission                   Good


     Weight change and time frame                Pt denies having loss body


                                                 weight PTA.


                                                 Last visit (08/23/22)


                                                 anthropometrics: Ht 5'9", Wt


                                                 54.43Kg, BMI: 17.7 Kg/m2.


                                                 Discrepancy on Ht noted, but


                                                 body weight remains within


                                                 same range.


     Weight Status                               Underweight


     Subjective/Other Information                RD consult for Low BMI


                                                 assessment.


                                                 No reports available on Pt's


                                                 PO intake of meals at the time


                                                 , will assess at F/U.


                                                 I will prescribe dietary


                                                 supplements to compensate for


                                                 poor or insufficient PO intake


                                                 of meals during LOS.


                                                 Pt is on Room Air, O2


                                                 saturation @ 96%, according to


                                                 Physical Assessment History


                                                 notes.


                                                 Pt complains of having Nausea,


                                                 according to Physical


                                                 Assessment History notes.


                                                 Pt has being non copliant and


                                                 is refusing HD, MD restrained


                                                 and sedated Pt to perform


                                                 emergrncy HD on 09/14,


                                                 according to Event notes.


                                                 Pt's Low BMI seems to


                                                 correspond to a natural body


                                                 composition, and not related


                                                 to a sudden loss of body


                                                 weight nor chronic


                                                 malnutrition, since no signs


                                                 of concern were mentioned in


                                                 the Physical Assessment


                                                 History or the Progress notes.


     Percent of energy/protein needs met:        Prescribed Renal Diet provides


                                                 for energy/protein needs (2,


                                                 072 Kcal/77 g) during LOS;


                                                 additionally, Dietary


                                                 Supplements will compensate


                                                 for possible poor or


                                                 insufficient PO intake of


                                                 meals with 850 Kcal and 38 g


                                                 of protein.


     Burn                                        Absent


     Trauma                                      Absent


     GI Symptoms                                 Nausea


     Food Allergy                                No


     Skin Integrity/Comment                      Assessment WNL.


     Minimum of two criteria                     No


     Fluid Accumulation                          N/A


     Reduced  Strength                       N/A (non-severe)


     Protein-Calorie Malnutrition                N\A


     #1


      Nutrition Diagnosis                        Predicted suboptimal energy


                                                 intake


      Etiology                                   AMS.


      As Evidenced by Signs and Symptoms         No reports available on Pt's


                                                 PO intake of meals at the time


                                                 , will assess at F/U.


     Is patient on ventilator?                   No


     Is Patient Ambulatory and/or Out of Bed     Yes


     [facility]-([facility]-ambulatory/OOB) [     1753.219


      [email or web address].[email or web address]]                               


     Calculation Used for Recommendations        [employer]- [employer]


     Additional Notes                            Protein: >1.2 g/Kg ABW; >64 g/


                                                 day.


                                                 Fluids: 1 ml/Kcal, or as per


                                                 MD.


 Nutrition Intervention


     Change Diet Order:                          Continue Renal Diet as


                                                 tolerated.


     Add Supplement/Snack (indicate name/kcal    Start 8 fl oz Nepro w/


      /protein )                                 CARBSTEADY; BID.


     Provides kCal:                              850


     Provides Protein (gm)                       38


     Goal #1                                     Compensate, through dietary


                                                 supplementation, for possible


                                                 poor or insufficient PO intake


                                                 of meals during LOS.


     Follow-Up By:                               09/22/22


     Additional Comments                         Continue monitoring food


                                                 tolerance, %PO intake of meals


                                                 , dietary supplements, and BM.

## 2022-09-20 NOTE — PROGRESS NOTE
Assessment and Plan





End-stage renal disease on hemodialysis


Uremia, resolved with HD


Hyperkalemia, controlled with HD


Anemia of ESRD


Hyperphosphatemia


Hyperparathyroidism





Patient's mental status improved following dialysis but remains altered today. 

Case has been discussed in detail with him regarding continuation of dialysis 

per report.  He said that he would like to continue chronic treatment with 

dialysis going forward.  However he is now refusing dialysis, refused yesterday,

will offer again today.  Do note plans for hospice care and agree with plan


Continue binders


Renally dose medications


ESRD diet with 1.2-1. 4 g/kg/d protein intake





Subjective


Date of service: 09/20/22


Principal diagnosis: Encephalopathy


Interval history: 





Resting in bed, disheveled appearing


Vitals, labs and I/os reviewed


Interdisciplinary notes and consults reviewed





Objective





- Exam


Narrative Exam: 





Constitutional: no acute distress


Head: NC/AT


Neck: supple


Lungs: clear to auscultation


CV: RRR


Abdomen: soft, non-tender, bowel sounds present


Back: nontender


Extremities: no edema, pulses WNL


Skin: intact


Neuro: Awake and alert





- Vital Signs


Vital signs: 


                               Vital Signs - 12hr











  09/20/22 09/20/22





  00:50 04:12


 


Temperature 97.9 F 97.9 F


 


Pulse Rate 83 


 


Respiratory 18 16





Rate  


 


Blood Pressure 112/59 143/76


 


O2 Sat by Pulse 100 





Oximetry  














- Lab





                                 09/17/22 20:59





                                 09/17/22 20:59


                             Most recent lab results











Calcium  8.3 mg/dL (8.4-10.2)  L  09/17/22  20:59    














Medications & Allergies





- Medications


Allergies/Adverse Reactions: 


                                    Allergies





No Known Allergies Allergy (Verified 09/15/22 09:46)


   








Home Medications: 


                                Home Medications











 Medication  Instructions  Recorded  Confirmed  Last Taken  Type


 


Aspirin EC [Halfprin EC] 81 mg PO QDAY #30 tablet. 08/25/22 09/15/22 Unknown 

Rx


 


Sevelamer Carbonate [Renvela] 2,400 mg PO AC #60 tablet 08/25/22 09/15/22 

Unknown Rx


 


Zolpidem [Ambien] 5 mg PO QHS PRN #7 tablet 08/25/22 09/15/22 Unknown Rx


 


Metoprolol Tartrate [Lopressor] 100 mg PO BID 09/15/22 09/15/22 Unknown History


 


levETIRAcetam [Keppra TAB] 500 mg PO BID 09/15/22 09/15/22 Unknown History











Active Medications: 














Generic Name Dose Route Start Last Admin





  Trade Name Freq  PRN Reason Stop Dose Admin


 


Acetaminophen  650 mg  09/14/22 02:56 





  Acetaminophen 325 Mg Tab  PO  





  Q4H PRN  





  Pain MILD(1-3)/Fever >100.5/HA  


 


Aspirin  81 mg  09/14/22 10:00  09/19/22 10:38





  Aspirin Ec 81 Mg Tab  PO   81 mg





  QDAY LASHA   Administration


 


Dextrose  50 ml  09/14/22 02:56  09/14/22 11:34





  Dextrose 50% In Water (25gm) 50 Ml Syringe  IV   50 ml





  Q30MIN PRN   Administration





  Hypoglycemia  





  Protocol  


 


Sodium Chloride  100 mls @ 999 mls/hr  09/14/22 08:00 





  Nacl 0.9%  IV  





  EUSEBIA PRN  





  Hypotension  


 


Insulin Human Lispro  0 unit  09/14/22 07:30  09/20/22 06:57





  Insulin Lispro 100 Unit/Ml  SUB-Q   Not Given





  [facility]  





  Protocol  


 


Levetiracetam  500 mg  09/14/22 10:00  09/19/22 22:45





  Levetiracetam 500 Mg Tab  PO   500 mg





  BID LASHA   Administration


 


Metoclopramide HCl  10 mg  09/14/22 18:00 





  Metoclopramide 10 Mg/2 Ml Inj  IV  





  Q6H PRN  





  Nausea And Vomiting  


 


Metoprolol Tartrate  50 mg  09/17/22 22:00  09/19/22 22:45





  Metoprolol Tartrate 50 Mg Tab  PO   50 mg





  BID LASHA   Administration


 


Morphine Sulfate  2 mg  09/14/22 02:56  09/14/22 15:13





  Morphine 2 Mg/1 Ml Inj  IV   2 mg





  Q4H PRN   Administration





  Pain, Moderate (4-6)  


 


Morphine Sulfate  4 mg  09/14/22 02:56 





  Morphine 4 Mg/1 Ml Inj  IV  





  Q4H PRN  





  Pain , Severe (7-10)  


 


Ondansetron HCl  4 mg  09/14/22 02:56 





  Ondansetron 4 Mg/2 Ml Inj  IV  





  Q8H PRN  





  Nausea And Vomiting  


 


Sevelamer Carbonate  2,400 mg  09/14/22 07:30  09/20/22 06:57





  Sevelamer Carbonate 800 Mg Tab  PO   Not Given





  AC LASHA  


 


Sodium Chloride  10 ml  09/14/22 10:00  09/19/22 22:45





  Sodium Chloride 0.9% 10 Ml Flush Syringe  IV   10 ml





  BID LASHA   Administration


 


Sodium Chloride  10 ml  09/14/22 02:56 





  Sodium Chloride 0.9% 10 Ml Flush Syringe  IV  





  PRN PRN  





  LINE FLUSH  


 


Zolpidem Tartrate  5 mg  09/14/22 22:00 





  Zolpidem 5 Mg Tab  PO  





  QHS PRN  





  Sleep

## 2022-09-21 PROCEDURE — 5A1D70Z PERFORMANCE OF URINARY FILTRATION, INTERMITTENT, LESS THAN 6 HOURS PER DAY: ICD-10-PCS | Performed by: INTERNAL MEDICINE

## 2022-09-21 RX ADMIN — INSULIN LISPRO SCH: 100 INJECTION, SOLUTION INTRAVENOUS; SUBCUTANEOUS at 18:49

## 2022-09-21 RX ADMIN — INSULIN LISPRO SCH: 100 INJECTION, SOLUTION INTRAVENOUS; SUBCUTANEOUS at 12:54

## 2022-09-21 RX ADMIN — INSULIN LISPRO SCH: 100 INJECTION, SOLUTION INTRAVENOUS; SUBCUTANEOUS at 08:54

## 2022-09-21 RX ADMIN — Medication SCH ML: at 22:31

## 2022-09-21 RX ADMIN — SEVELAMER CARBONATE SCH: 800 TABLET, FILM COATED ORAL at 18:49

## 2022-09-21 RX ADMIN — ASPIRIN SCH MG: 81 TABLET, COATED ORAL at 13:48

## 2022-09-21 RX ADMIN — INSULIN LISPRO SCH: 100 INJECTION, SOLUTION INTRAVENOUS; SUBCUTANEOUS at 22:32

## 2022-09-21 RX ADMIN — SEVELAMER CARBONATE SCH: 800 TABLET, FILM COATED ORAL at 15:55

## 2022-09-21 RX ADMIN — METOPROLOL TARTRATE SCH MG: 50 TABLET, FILM COATED ORAL at 22:31

## 2022-09-21 RX ADMIN — METOPROLOL TARTRATE SCH MG: 50 TABLET, FILM COATED ORAL at 13:48

## 2022-09-21 RX ADMIN — Medication SCH ML: at 13:49

## 2022-09-21 NOTE — PROGRESS NOTE
Assessment and Plan


Assessment and plan: 


1.  End-stage renal disease on dialysis


Nephrology following, hemodialysis per schedule


Avoid nephrotoxin, renal dosing of medications





2.  Noncompliance  with dialysis


Counseling done strongly advised to comply with medications diet


Hemodialysis and follow-up doctors visits





3.  Hyperkalemia;


Improved, closely monitor electrolytes





4.  Hypertension; moderate control


Continue current antihypertensives and as needed medications





5.  Possible underlying dementia


Supportive care





--Patient DNR and hospice





Discharge planning; per case management


Possible discharge to SNF with hospice


Pending authorization





Closely monitor the patient and adjust the management as needed


Plan of care reviewed with the patient and his nurse


I also discussed extensively with case management


Pending authorization





9/21/22;


Pending authorization for SNF placement


DC planning per case management











History


Interval history: 


I have seen and examined the patient at the bedside


No new complaints


Patient is emaciated and cachectic


Awaiting SNF placement with hospice


Vital signs reviewed








Hospitalist Physical





- Constitutional


Vitals: 


                                        











Temp Pulse Resp BP Pulse Ox


 


 98.2 F   75   17   107/57   100 


 


 09/21/22 13:30  09/21/22 13:30  09/21/22 16:00  09/21/22 13:30  09/21/22 13:30











General appearance: Present: no acute distress, cachectic, disheveled, other 

(malnourished)





- EENT


Eyes: Present: PERRL, EOM intact





- Neck


Neck: Present: supple, normal ROM





- Respiratory


Respiratory effort: normal


Respiratory: bilateral: diminished, negative: rales, rhonchi, wheezing





- Cardiovascular


Rhythm: regular


Heart Sounds: Present: S1 & S2





- Extremities


Extremities: no ischemia, pulses intact, No edema





- Abdominal


General gastrointestinal: soft, non-tender, non-distended, normal bowel sounds





- Integumentary


Integumentary: Present: clear, warm





- Psychiatric


Psychiatric: appropriate mood/affect, cooperative





- Neurologic


Neurologic: moves all extremities, other (Minimally communicative)





Results





- Labs


CBC & Chem 7: 


                                 09/17/22 20:59





                                 09/17/22 20:59


Labs: 


                             Laboratory Last Values











WBC  7.8 K/mm3 (4.5-11.0)   09/17/22  20:59    


 


RBC  2.50 M/mm3 (3.65-5.03)  L  09/17/22  20:59    


 


Hgb  7.9 gm/dl (11.8-15.2)  L  09/17/22  20:59    


 


Hct  23.3 % (35.5-45.6)  L  09/17/22  20:59    


 


MCV  93 fl (84-94)   09/17/22  20:59    


 


MCH  32 pg (28-32)   09/17/22  20:59    


 


MCHC  34 % (32-34)   09/17/22  20:59    


 


RDW  15.6 % (13.2-15.2)  H  09/17/22  20:59    


 


Plt Count  182 K/mm3 (140-440)   09/17/22  20:59    


 


Lymph % (Auto)  18.0 % (13.4-35.0)   09/17/22  20:59    


 


Mono % (Auto)  10.7 % (0.0-7.3)  H  09/17/22  20:59    


 


Eos % (Auto)  1.6 % (0.0-4.3)   09/17/22  20:59    


 


Baso % (Auto)  0.7 % (0.0-1.8)   09/17/22  20:59    


 


Lymph # (Auto)  1.4 K/mm3 (1.2-5.4)   09/17/22  20:59    


 


Mono # (Auto)  0.8 K/mm3 (0.0-0.8)   09/17/22  20:59    


 


Eos # (Auto)  0.1 K/mm3 (0.0-0.4)   09/17/22  20:59    


 


Baso # (Auto)  0.1 K/mm3 (0.0-0.1)   09/17/22  20:59    


 


Seg Neutrophils %  69.0 % (40.0-70.0)   09/17/22  20:59    


 


Seg Neutrophils #  5.4 K/mm3 (1.8-7.7)   09/17/22  20:59    


 


Sodium  140 mmol/L (137-145)   09/17/22  20:59    


 


Potassium  5.5 mmol/L (3.6-5.0)  H  09/17/22  20:59    


 


Chloride  91.0 mmol/L ()  L  09/17/22  20:59    


 


Carbon Dioxide  37 mmol/L (22-30)  H  09/17/22  20:59    


 


Anion Gap  18 mmol/L  09/17/22  20:59    


 


BUN  64 mg/dL (9-20)  H  09/17/22  20:59    


 


Creatinine  9.9 mg/dL (0.8-1.3)  H  09/17/22  20:59    


 


Estimated GFR  7 ml/min  09/17/22  20:59    


 


BUN/Creatinine Ratio  6 %  09/17/22  20:59    


 


Glucose  103 mg/dL ()  H  09/17/22  20:59    


 


POC Glucose  175 mg/dL ()  H  09/21/22  15:46    


 


Calcium  8.3 mg/dL (8.4-10.2)  L  09/17/22  20:59    


 


Total Bilirubin  0.30 mg/dL (0.1-1.2)   09/13/22  22:48    


 


AST  12 units/L (5-40)   09/13/22  22:48    


 


ALT  26 units/L (7-56)   09/13/22  22:48    


 


Alkaline Phosphatase  122 units/L ()   09/13/22  22:48    


 


Total Protein  6.9 g/dL (6.3-8.2)   09/13/22  22:48    


 


Albumin  3.8 g/dL (3.9-5)  L  09/13/22  22:48    


 


Albumin/Globulin Ratio  1.2 %  09/13/22  22:48    


 


Coronavirus (PCR)  Negative  (Negative)   09/16/22  10:00    


 


SARS-CoV-2 (PCR)  Negative  (Negative)   09/20/22  11:34    


 


Hepatitis A IgM Ab  Non-reactive  (NonReactive)   09/14/22  08:31    


 


Hep Bs Antigen  Non-reactive  (Negative)   09/14/22  08:31    


 


Hep B Core IgM Ab  Non-reactive  (NonReactive)   09/14/22  08:31    


 


Hepatitis C Antibody  Non-reactive  (NonReactive)   09/14/22  08:31    











Petty/IV: 


                                        





Voiding Method                   Toilet











Active Medications





- Current Medications


Current Medications: 














Generic Name Dose Route Start Last Admin





  Trade Name Freq  PRN Reason Stop Dose Admin


 


Acetaminophen  650 mg  09/14/22 02:56 





  Acetaminophen 325 Mg Tab  PO  





  Q4H PRN  





  Pain MILD(1-3)/Fever >100.5/HA  


 


Aspirin  81 mg  09/14/22 10:00  09/21/22 13:48





  Aspirin Ec 81 Mg Tab  PO   81 mg





  QDAY LASHA   Administration


 


Dextrose  50 ml  09/14/22 02:56  09/14/22 11:34





  Dextrose 50% In Water (25gm) 50 Ml Syringe  IV   50 ml





  Q30MIN PRN   Administration





  Hypoglycemia  





  Protocol  


 


Sodium Chloride  100 mls @ 999 mls/hr  09/14/22 08:00 





  Nacl 0.9%  IV  





  EUSEBIA PRN  





  Hypotension  


 


Insulin Human Lispro  0 unit  09/14/22 07:30  09/21/22 18:49





  Insulin Lispro 100 Unit/Ml  SUB-Q   Not Given





  ACHS LASHA  





  Protocol  


 


Levetiracetam  500 mg  09/14/22 10:00  09/21/22 13:48





  Levetiracetam 500 Mg Tab  PO   500 mg





  BID LASHA   Administration


 


Metoclopramide HCl  10 mg  09/14/22 18:00 





  Metoclopramide 10 Mg/2 Ml Inj  IV  





  Q6H PRN  





  Nausea And Vomiting  


 


Metoprolol Tartrate  50 mg  09/17/22 22:00  09/21/22 13:48





  Metoprolol Tartrate 50 Mg Tab  PO   50 mg





  BID LASHA   Administration


 


Morphine Sulfate  2 mg  09/14/22 02:56  09/14/22 15:13





  Morphine 2 Mg/1 Ml Inj  IV   2 mg





  Q4H PRN   Administration





  Pain, Moderate (4-6)  


 


Morphine Sulfate  4 mg  09/14/22 02:56 





  Morphine 4 Mg/1 Ml Inj  IV  





  Q4H PRN  





  Pain , Severe (7-10)  


 


Ondansetron HCl  4 mg  09/14/22 02:56 





  Ondansetron 4 Mg/2 Ml Inj  IV  





  Q8H PRN  





  Nausea And Vomiting  


 


Sevelamer Carbonate  2,400 mg  09/14/22 07:30  09/21/22 18:49





  Sevelamer Carbonate 800 Mg Tab  PO   Not Given





  AC LASHA  


 


Sodium Chloride  10 ml  09/14/22 10:00  09/21/22 13:49





  Sodium Chloride 0.9% 10 Ml Flush Syringe  IV   10 ml





  BID LASHA   Administration


 


Sodium Chloride  10 ml  09/14/22 02:56 





  Sodium Chloride 0.9% 10 Ml Flush Syringe  IV  





  PRN PRN  





  LINE FLUSH  


 


Zolpidem Tartrate  5 mg  09/14/22 22:00 





  Zolpidem 5 Mg Tab  PO  





  QHS PRN  





  Sleep  














Nutrition/Malnutrition Assess





- Dietary Evaluation


Nutrition/Malnutrition Findings: 


                                        





Nutrition Notes                                            Start:  09/15/22 

10:48


Freq:                                                      Status: Active       




Protocol:                                                                       




 Document     09/15/22 10:48  GLORIA  (Rec: 09/15/22 11:16  GLORIA [id number])


 Nutrition Notes


     Need for Assessment generated from:         Low BMI


     Initial or Follow up                        Assessment


     Current Diagnosis                           CKD (stage V CKD),Diabetes,


                                                 Hypertension


     Other Pertinent Diagnosis                   AMS, ESRD+HD, Hyperkalemia,


                                                 Hyperphosphatemia, Uremia,


                                                 Anemia, ...


     Current Diet                                Renal Diet (since B 09/14), D


                                                 Suppl (from D 09/15).


     Labs/Tests                                  09/15: K 5.2, Cl 96.3, CO2 33,


                                                 BUN 63, Crea 10.9.


     Pertinent Medications                       09/15: Renvela.


     Height                                      5 ft 7 in


     Weight                                      53 kg


     Ideal Body Weight (kg)                      67.27


     BMI                                         18.3


     Intake Prior to Admission                   Good


     Weight change and time frame                Pt denies having loss body


                                                 weight PTA.


                                                 Last visit (08/23/22)


                                                 anthropometrics: Ht 5'9", Wt


                                                 54.43Kg, BMI: 17.7 Kg/m2.


                                                 Discrepancy on Ht noted, but


                                                 body weight remains within


                                                 same range.


     Weight Status                               Underweight


     Subjective/Other Information                RD consult for Low BMI


                                                 assessment.


                                                 No reports available on Pt's


                                                 PO intake of meals at the time


                                                 , will assess at F/U.


                                                 I will prescribe dietary


                                                 supplements to compensate for


                                                 poor or insufficient PO intake


                                                 of meals during LOS.


                                                 Pt is on Room Air, O2


                                                 saturation @ 96%, according to


                                                 Physical Assessment History


                                                 notes.


                                                 Pt complains of having Nausea,


                                                 according to Physical


                                                 Assessment History notes.


                                                 Pt has being non copliant and


                                                 is refusing HD, MD restrained


                                                 and sedated Pt to perform


                                                 emergrncy HD on 09/14,


                                                 according to Event notes.


                                                 Pt's Low BMI seems to


                                                 correspond to a natural body


                                                 composition, and not related


                                                 to a sudden loss of body


                                                 weight nor chronic


                                                 malnutrition, since no signs


                                                 of concern were mentioned in


                                                 the Physical Assessment


                                                 History or the Progress notes.


     Percent of energy/protein needs met:        Prescribed Renal Diet provides


                                                 for energy/protein needs (2,


                                                 072 Kcal/77 g) during LOS;


                                                 additionally, Dietary


                                                 Supplements will compensate


                                                 for possible poor or


                                                 insufficient PO intake of


                                                 meals with 850 Kcal and 38 g


                                                 of protein.


     Burn                                        Absent


     Trauma                                      Absent


     GI Symptoms                                 Nausea


     Food Allergy                                No


     Skin Integrity/Comment                      Assessment WNL.


     Minimum of two criteria                     No


     Fluid Accumulation                          N/A


     Reduced  Strength                       N/A (non-severe)


     Protein-Calorie Malnutrition                N\A


     #1


      Nutrition Diagnosis                        Predicted suboptimal energy


                                                 intake


      Etiology                                   AMS.


      As Evidenced by Signs and Symptoms         No reports available on Pt's


                                                 PO intake of meals at the time


                                                 , will assess at F/U.


     Is patient on ventilator?                   No


     Is Patient Ambulatory and/or Out of Bed     Yes


     REE-([facility]-ambulatory/OOB) [     [id number]


      [email or web address]]                               


     Calculation Used for Recommendations        [facility]


     Additional Notes                            Protein: >1.2 g/Kg ABW; >64 g/


                                                 day.


                                                 Fluids: 1 ml/Kcal, or as per


                                                 MD.


 Nutrition Intervention


     Change Diet Order:                          Continue Renal Diet as


                                                 tolerated.


     Add Supplement/Snack (indicate name/kcal    Start 8 fl oz Nepro w/


      /protein )                                 CARBSTEADY; BID.


     Provides kCal:                              850


     Provides Protein (gm)                       38


     Goal #1                                     Compensate, through dietary


                                                 supplementation, for possible


                                                 poor or insufficient PO intake


                                                 of meals during LOS.


     Follow-Up By:                               09/22/22


     Additional Comments                         Continue monitoring food


                                                 tolerance, %PO intake of meals


                                                 , dietary supplements, and BM.

## 2022-09-21 NOTE — PROGRESS NOTE
Assessment and Plan





End-stage renal disease on hemodialysis


Uremia, resolved with HD


Hyperkalemia, controlled with HD


Anemia of ESRD


Hyperphosphatemia


Hyperparathyroidism





Patient's mental status improved following dialysis but remains altered today. 

Case has been discussed in detail with him regarding continuation of dialysis 

per report.  He said that he would like to continue chronic treatment with 

dialysis per prior report. However he is now refusing dialysis.  Do note plans 

for hospice care and agree with plan


Continue binders


Renally dose medications


ESRD diet with 1.2-1. 4 g/kg/d protein intake





Subjective


Date of service: 09/21/22


Principal diagnosis: Encephalopathy


Interval history: 





Resting in bed, disheveled appearing, refusing dialysis today


Vitals, labs and I/os reviewed


Interdisciplinary notes and consults reviewed





Objective





- Exam


Narrative Exam: 





Constitutional: no acute distress


Head: NC/AT


Neck: supple


Lungs: clear to auscultation


CV: RRR


Abdomen: soft, non-tender, bowel sounds present


Back: nontender


Extremities: no edema, pulses WNL


Skin: intact


Neuro: Awake and alert





- Vital Signs


Vital signs: 


                               Vital Signs - 12hr











  09/21/22 09/21/22 09/21/22





  10:00 10:40 11:00


 


Temperature  98.2 F 


 


Pulse Rate  79 79


 


Respiratory  18 





Rate   


 


Respiratory   





Rate [no pain]   


 


Blood Pressure  118/67 119/67


 


Blood Pressure   





[Right]   


 


O2 Sat by Pulse 98  





Oximetry   


 


O2 Sat by Pulse  100 





Oximetry [   





Anterior   





Bilateral   





Throughout]   


 


O2 Sat by Pulse  100 





Oximetry [   





Posterior   





Bilateral]   














  09/21/22 09/21/22 09/21/22





  11:15 11:30 11:45


 


Temperature   


 


Pulse Rate 78 83 81


 


Respiratory   





Rate   


 


Respiratory   





Rate [no pain]   


 


Blood Pressure 120/69 114/63 104/58


 


Blood Pressure   





[Right]   


 


O2 Sat by Pulse   





Oximetry   


 


O2 Sat by Pulse   





Oximetry [   





Anterior   





Bilateral   





Throughout]   


 


O2 Sat by Pulse   





Oximetry [   





Posterior   





Bilateral]   














  09/21/22 09/21/22 09/21/22





  12:00 12:15 13:30


 


Temperature 98.0 F  98.2 F


 


Pulse Rate 78 77 75


 


Respiratory 18  20





Rate   


 


Respiratory   





Rate [no pain]   


 


Blood Pressure 103/55 104/57 107/57


 


Blood Pressure 115/73  





[Right]   


 


O2 Sat by Pulse 89  





Oximetry   


 


O2 Sat by Pulse   100





Oximetry [   





Anterior   





Bilateral   





Throughout]   


 


O2 Sat by Pulse   100





Oximetry [   





Posterior   





Bilateral]   














  09/21/22





  16:00


 


Temperature 


 


Pulse Rate 


 


Respiratory 





Rate 


 


Respiratory 17





Rate [no pain] 


 


Blood Pressure 


 


Blood Pressure 





[Right] 


 


O2 Sat by Pulse 





Oximetry 


 


O2 Sat by Pulse 





Oximetry [ 





Anterior 





Bilateral 





Throughout] 


 


O2 Sat by Pulse 





Oximetry [ 





Posterior 





Bilateral] 














- Lab





                                 09/17/22 20:59





                                 09/17/22 20:59


                             Most recent lab results











Calcium  8.3 mg/dL (8.4-10.2)  L  09/17/22  20:59    














Medications & Allergies





- Medications


Allergies/Adverse Reactions: 


                                    Allergies





No Known Allergies Allergy (Verified 09/15/22 09:46)


   








Home Medications: 


                                Home Medications











 Medication  Instructions  Recorded  Confirmed  Last Taken  Type


 


Aspirin EC [Halfprin EC] 81 mg PO QDAY #30 tablet. 08/25/22 09/15/22 Unknown 

Rx


 


Sevelamer Carbonate [Renvela] 2,400 mg PO AC #60 tablet 08/25/22 09/15/22 

Unknown Rx


 


Zolpidem [Ambien] 5 mg PO QHS PRN #7 tablet 08/25/22 09/15/22 Unknown Rx


 


Metoprolol Tartrate [Lopressor] 100 mg PO BID 09/15/22 09/15/22 Unknown History


 


levETIRAcetam [Keppra TAB] 500 mg PO BID 09/15/22 09/15/22 Unknown History











Active Medications: 














Generic Name Dose Route Start Last Admin





  Trade Name Freq  PRN Reason Stop Dose Admin


 


Acetaminophen  650 mg  09/14/22 02:56 





  Acetaminophen 325 Mg Tab  PO  





  Q4H PRN  





  Pain MILD(1-3)/Fever >100.5/HA  


 


Aspirin  81 mg  09/14/22 10:00  09/21/22 13:48





  Aspirin Ec 81 Mg Tab  PO   81 mg





  QDAY LASHA   Administration


 


Dextrose  50 ml  09/14/22 02:56  09/14/22 11:34





  Dextrose 50% In Water (25gm) 50 Ml Syringe  IV   50 ml





  Q30MIN PRN   Administration





  Hypoglycemia  





  Protocol  


 


Sodium Chloride  100 mls @ 999 mls/hr  09/14/22 08:00 





  Nacl 0.9%  IV  





  EUSEBIA PRN  





  Hypotension  


 


Insulin Human Lispro  0 unit  09/14/22 07:30  09/21/22 18:49





  Insulin Lispro 100 Unit/Ml  SUB-Q   Not Given





  ACHS LASHA  





  Protocol  


 


Levetiracetam  500 mg  09/14/22 10:00  09/21/22 13:48





  Levetiracetam 500 Mg Tab  PO   500 mg





  BID LASHA   Administration


 


Metoclopramide HCl  10 mg  09/14/22 18:00 





  Metoclopramide 10 Mg/2 Ml Inj  IV  





  Q6H PRN  





  Nausea And Vomiting  


 


Metoprolol Tartrate  50 mg  09/17/22 22:00  09/21/22 13:48





  Metoprolol Tartrate 50 Mg Tab  PO   50 mg





  BID LASHA   Administration


 


Morphine Sulfate  2 mg  09/14/22 02:56  09/14/22 15:13





  Morphine 2 Mg/1 Ml Inj  IV   2 mg





  Q4H PRN   Administration





  Pain, Moderate (4-6)  


 


Morphine Sulfate  4 mg  09/14/22 02:56 





  Morphine 4 Mg/1 Ml Inj  IV  





  Q4H PRN  





  Pain , Severe (7-10)  


 


Ondansetron HCl  4 mg  09/14/22 02:56 





  Ondansetron 4 Mg/2 Ml Inj  IV  





  Q8H PRN  





  Nausea And Vomiting  


 


Sevelamer Carbonate  2,400 mg  09/14/22 07:30  09/21/22 18:49





  Sevelamer Carbonate 800 Mg Tab  PO   Not Given





  AC LASHA  


 


Sodium Chloride  10 ml  09/14/22 10:00  09/21/22 13:49





  Sodium Chloride 0.9% 10 Ml Flush Syringe  IV   10 ml





  BID LASHA   Administration


 


Sodium Chloride  10 ml  09/14/22 02:56 





  Sodium Chloride 0.9% 10 Ml Flush Syringe  IV  





  PRN PRN  





  LINE FLUSH  


 


Zolpidem Tartrate  5 mg  09/14/22 22:00 





  Zolpidem 5 Mg Tab  PO  





  QHS PRN  





  Sleep

## 2022-09-22 LAB
BUN SERPL-MCNC: 83 MG/DL (ref 9–20)
BUN/CREAT SERPL: 7 %
CALCIUM SERPL-MCNC: 9.2 MG/DL (ref 8.4–10.2)
HCT VFR BLD CALC: 22.4 % (ref 35.5–45.6)
HEMOLYSIS INDEX: 2
HGB BLD-MCNC: 7.4 GM/DL (ref 11.8–15.2)

## 2022-09-22 RX ADMIN — INSULIN LISPRO SCH: 100 INJECTION, SOLUTION INTRAVENOUS; SUBCUTANEOUS at 09:06

## 2022-09-22 RX ADMIN — Medication SCH ML: at 10:13

## 2022-09-22 RX ADMIN — METOPROLOL TARTRATE SCH MG: 50 TABLET, FILM COATED ORAL at 21:01

## 2022-09-22 RX ADMIN — SEVELAMER CARBONATE SCH: 800 TABLET, FILM COATED ORAL at 18:15

## 2022-09-22 RX ADMIN — INSULIN LISPRO SCH: 100 INJECTION, SOLUTION INTRAVENOUS; SUBCUTANEOUS at 18:14

## 2022-09-22 RX ADMIN — Medication SCH ML: at 21:01

## 2022-09-22 RX ADMIN — Medication SCH: at 21:08

## 2022-09-22 RX ADMIN — SEVELAMER CARBONATE SCH MG: 800 TABLET, FILM COATED ORAL at 10:13

## 2022-09-22 RX ADMIN — SEVELAMER CARBONATE SCH: 800 TABLET, FILM COATED ORAL at 09:06

## 2022-09-22 RX ADMIN — METOPROLOL TARTRATE SCH MG: 50 TABLET, FILM COATED ORAL at 10:13

## 2022-09-22 RX ADMIN — ASPIRIN SCH MG: 81 TABLET, COATED ORAL at 10:13

## 2022-09-22 NOTE — PROGRESS NOTE
Assessment and Plan


Assessment and plan: 


1.  End-stage renal disease on dialysis


Nephrology following, hemodialysis per schedule


Avoid nephrotoxin, renal dosing of medications





2.  Noncompliance  with dialysis


Counseling done strongly advised to comply with medications diet


Hemodialysis and follow-up doctors visits


Patient continues to refuse dialysis


Nephrology aware





3.  Hyperkalemia;


Improved, closely monitor electrolytes





4.  Hypertension; moderate control


Continue current antihypertensives and as needed medications





5.  Possible underlying dementia


Supportive care





6--Patient DNR and hospice





Discharge planning; per case management


Possible discharge to SNF with hospice


Pending authorization





Closely monitor the patient and adjust the management as needed


Plan of care reviewed with the patient and his nurse


I also discussed extensively with case management


Pending authorization





9/21/22;


Pending authorization for SNF placement


DC planning per case management





9/22 pending placement SNF with hospice


Case management team processing


Pending authorization











History


Interval history: 


Seen and examined the patient at the bedside


Patient's chart and medications reviewed


Patient is chronically ill looking emaciated and cachectic


Not in acute distress


Minimally communicative vital signs noted


Patient refused again for dialysis today








Hospitalist Physical





- Constitutional


Vitals: 


                                        











Temp Pulse Resp BP Pulse Ox


 


 97.9 F   86   17   134/76   100 


 


 09/22/22 15:43  09/22/22 15:43  09/22/22 16:00  09/22/22 15:43  09/22/22 15:43











General appearance: Present: no acute distress, cachectic, disheveled, other 

(malnourished)





- EENT


Eyes: Present: PERRL, EOM intact





- Neck


Neck: Present: supple, normal ROM





- Respiratory


Respiratory effort: normal


Respiratory: bilateral: diminished, negative: rales, rhonchi, wheezing





- Cardiovascular


Rhythm: regular


Heart Sounds: Present: S1 & S2





- Extremities


Extremities: no ischemia, No edema





- Abdominal


General gastrointestinal: soft, non-tender, non-distended, normal bowel sounds





- Integumentary


Integumentary: Present: clear, warm





- Psychiatric


Psychiatric: appropriate mood/affect, cooperative





- Neurologic


Neurologic: moves all extremities





Results





- Labs


CBC & Chem 7: 


                                 09/22/22 05:13





                                 09/22/22 04:00


Labs: 


                             Laboratory Last Values











WBC  7.8 K/mm3 (4.5-11.0)   09/17/22  20:59    


 


RBC  2.50 M/mm3 (3.65-5.03)  L  09/17/22  20:59    


 


Hgb  7.4 gm/dl (11.8-15.2)  L  09/22/22  05:13    


 


Hct  22.4 % (35.5-45.6)  L  09/22/22  05:13    


 


MCV  93 fl (84-94)   09/17/22  20:59    


 


MCH  32 pg (28-32)   09/17/22  20:59    


 


MCHC  34 % (32-34)   09/17/22  20:59    


 


RDW  15.6 % (13.2-15.2)  H  09/17/22  20:59    


 


Plt Count  182 K/mm3 (140-440)   09/17/22  20:59    


 


Lymph % (Auto)  18.0 % (13.4-35.0)   09/17/22  20:59    


 


Mono % (Auto)  10.7 % (0.0-7.3)  H  09/17/22  20:59    


 


Eos % (Auto)  1.6 % (0.0-4.3)   09/17/22  20:59    


 


Baso % (Auto)  0.7 % (0.0-1.8)   09/17/22  20:59    


 


Lymph # (Auto)  1.4 K/mm3 (1.2-5.4)   09/17/22  20:59    


 


Mono # (Auto)  0.8 K/mm3 (0.0-0.8)   09/17/22  20:59    


 


Eos # (Auto)  0.1 K/mm3 (0.0-0.4)   09/17/22  20:59    


 


Baso # (Auto)  0.1 K/mm3 (0.0-0.1)   09/17/22  20:59    


 


Seg Neutrophils %  69.0 % (40.0-70.0)   09/17/22  20:59    


 


Seg Neutrophils #  5.4 K/mm3 (1.8-7.7)   09/17/22  20:59    


 


Sodium  142 mmol/L (137-145)   09/22/22  04:00    


 


Potassium  5.5 mmol/L (3.6-5.0)  H  09/22/22  04:00    


 


Chloride  91.1 mmol/L ()  L  09/22/22  04:00    


 


Carbon Dioxide  36 mmol/L (22-30)  H  09/22/22  04:00    


 


Anion Gap  20 mmol/L  09/22/22  04:00    


 


BUN  83 mg/dL (9-20)  H  09/22/22  04:00    


 


Creatinine  11.7 mg/dL (0.8-1.3)  H  09/22/22  04:00    


 


Estimated GFR  6 ml/min  09/22/22  04:00    


 


BUN/Creatinine Ratio  7 %  09/22/22  04:00    


 


Glucose  59 mg/dL ()  L  09/22/22  04:00    


 


POC Glucose  98 mg/dL ()   09/22/22  15:44    


 


Calcium  9.2 mg/dL (8.4-10.2)   09/22/22  04:00    


 


Magnesium  2.30 mg/dL (1.7-2.3)   09/22/22  04:00    


 


Total Bilirubin  0.30 mg/dL (0.1-1.2)   09/13/22  22:48    


 


AST  12 units/L (5-40)   09/13/22  22:48    


 


ALT  26 units/L (7-56)   09/13/22  22:48    


 


Alkaline Phosphatase  122 units/L ()   09/13/22  22:48    


 


Total Protein  6.9 g/dL (6.3-8.2)   09/13/22  22:48    


 


Albumin  3.8 g/dL (3.9-5)  L  09/13/22  22:48    


 


Albumin/Globulin Ratio  1.2 %  09/13/22  22:48    


 


Coronavirus (PCR)  Negative  (Negative)   09/16/22  10:00    


 


SARS-CoV-2 (PCR)  Negative  (Negative)   09/20/22  11:34    


 


Hepatitis A IgM Ab  Non-reactive  (NonReactive)   09/14/22  08:31    


 


Hep Bs Antigen  Non-reactive  (Negative)   09/14/22  08:31    


 


Hep B Core IgM Ab  Non-reactive  (NonReactive)   09/14/22  08:31    


 


Hepatitis C Antibody  Non-reactive  (NonReactive)   09/14/22  08:31    











Petty/IV: 


                                        





Voiding Method                   Toilet











Active Medications





- Current Medications


Current Medications: 














Generic Name Dose Route Start Last Admin





  Trade Name Freq  PRN Reason Stop Dose Admin


 


Acetaminophen  650 mg  09/14/22 02:56 





  Acetaminophen 325 Mg Tab  PO  





  Q4H PRN  





  Pain MILD(1-3)/Fever >100.5/HA  


 


Aspirin  81 mg  09/14/22 10:00  09/22/22 10:13





  Aspirin Ec 81 Mg Tab  PO   81 mg





  QDAY LASHA   Administration


 


Dextrose  50 ml  09/14/22 02:56  09/14/22 11:34





  Dextrose 50% In Water (25gm) 50 Ml Syringe  IV   50 ml





  Q30MIN PRN   Administration





  Hypoglycemia  





  Protocol  


 


Sodium Chloride  100 mls @ 999 mls/hr  09/22/22 09:00 





  Nacl 0.9%  IV  





  EUSEBIA PRN  





  Hypotension  


 


Insulin Human Lispro  0 unit  09/14/22 07:30  09/22/22 18:14





  Insulin Lispro 100 Unit/Ml  SUB-Q   Not Given





  ACHS LASHA  





  Protocol  


 


Levetiracetam  500 mg  09/14/22 10:00  09/22/22 10:13





  Levetiracetam 500 Mg Tab  PO   500 mg





  BID LASHA   Administration


 


Metoclopramide HCl  10 mg  09/14/22 18:00 





  Metoclopramide 10 Mg/2 Ml Inj  IV  





  Q6H PRN  





  Nausea And Vomiting  


 


Metoprolol Tartrate  50 mg  09/17/22 22:00  09/22/22 10:13





  Metoprolol Tartrate 50 Mg Tab  PO   50 mg





  BID LASHA   Administration


 


Morphine Sulfate  2 mg  09/14/22 02:56  09/14/22 15:13





  Morphine 2 Mg/1 Ml Inj  IV   2 mg





  Q4H PRN   Administration





  Pain, Moderate (4-6)  


 


Morphine Sulfate  4 mg  09/14/22 02:56 





  Morphine 4 Mg/1 Ml Inj  IV  





  Q4H PRN  





  Pain , Severe (7-10)  


 


Ondansetron HCl  4 mg  09/14/22 02:56 





  Ondansetron 4 Mg/2 Ml Inj  IV  





  Q8H PRN  





  Nausea And Vomiting  


 


Sevelamer Carbonate  2,400 mg  09/14/22 07:30  09/22/22 18:15





  Sevelamer Carbonate 800 Mg Tab  PO   Not Given





  AC LASHA  


 


Sodium Chloride  10 ml  09/14/22 10:00  09/22/22 10:13





  Sodium Chloride 0.9% 10 Ml Flush Syringe  IV   10 ml





  BID LSAHA   Administration


 


Sodium Chloride  10 ml  09/14/22 02:56 





  Sodium Chloride 0.9% 10 Ml Flush Syringe  IV  





  PRN PRN  





  LINE FLUSH  


 


Zolpidem Tartrate  5 mg  09/14/22 22:00 





  Zolpidem 5 Mg Tab  PO  





  QHS PRN  





  Sleep  














Nutrition/Malnutrition Assess





- Dietary Evaluation


Nutrition/Malnutrition Findings: 


                                        





Nutrition Notes                                            Start:  09/15/22 

10:48


Freq:                                                      Status: Active       




Protocol:                                                                       




 Document     09/22/22 14:25  GLORIA  (Rec: 09/22/22 14:43  GLORIA  [id number])


 Nutrition Notes


     Initial or Follow up                        Assessment


     Current Diagnosis                           CKD (stage V CKD),Diabetes,


                                                 Hypertension


     Other Pertinent Diagnosis                   AMS, r/o Dementia, ESRD+HD,


                                                 Hyperkalemia,


                                                 Hyperphosphatemia, Anemia, ...


     Current Diet                                Renal Diet (since B 09/14).


     Labs/Tests                                  09/22: K 5.5, Cl 91.1, CO2 36,


                                                 BUN 83, Crea 11.7, Glu 59.


     Pertinent Medications                       09/22: Renvela.


     Height                                      5 ft 7 in


     Weight                                      56.1 kg


     Ideal Body Weight (kg)                      67.27


     BMI                                         19.3


     Weight change and time frame                3 Kg body weight gain reported


                                                 in 1 week.


     Weight Status                               Appropriate


     Subjective/Other Information                RD consult for routine F/U on


                                                 dietary advancement.


                                                 Diet continues as prescribed,


                                                 but Dietary Supplements were


                                                 discontinued, No reports


                                                 available on Pt's PO intake of


                                                 meals at the time, will


                                                 assess at F/U.


                                                 Pt is on Room Air, O2


                                                 saturation @ 98%, according to


                                                 Vital Signs notes.


                                                 Pt is awaiting for SNF


                                                 placement, according to


                                                 Progress notes.


     Percent of energy/protein needs met:        Prescribed Renal Diet provides


                                                 for energy/protein needs (2,


                                                 072 Kcal/77 g) during LOS.


     Burn                                        Absent


     Trauma                                      Absent


     GI Symptoms                                 None


     Food Allergy                                No


     Skin Integrity/Comment                      Assessment WNL.


     Minimum of two criteria                     No


     Fluid Accumulation                          N/A


     Reduced  Strength                       N/A (non-severe)


     Protein-Calorie Malnutrition                N\A


     #1


      Nutrition Diagnosis                        Predicted suboptimal energy


                                                 intake


      Comments:                                  Diet continues as prescribed,


                                                 but Dietary Supplements were


                                                 discontinued, No reports


                                                 available on Pt's PO intake of


                                                 meals at the time, will


                                                 assess at F/U.


      Diagnosis Progress(for reassessment        Resolved


       documentation)                            


     Is patient on ventilator?                   No


     Is Patient Ambulatory and/or Out of Bed     Yes


     REE-(Flagler-St. Jeor-ambulatory/OOB) [     1793.519


      [email or web address]]                               


     Calculation Used for Recommendations        Flagler-St Jeor


     Additional Notes                            Protein: >1.2 g/Kg ABW; >64 g/


                                                 day.


                                                 Fluids: 1 ml/Kcal, or as per


                                                 MD.


 Nutrition Intervention


     Change Diet Order:                          Continue Renal Diet as


                                                 tolerated.


     Add Supplement/Snack (indicate name/kcal    Discontinued.


      /protein )                                 


     Goal #1                                     Adjust the dietary


                                                 intervention to better serve


                                                 Pt's energy/protein needs and


                                                 clinical conditions during LOS


                                                 .


     Follow-Up By:                               09/29/22


     Additional Comments                         Continue monitoring food


                                                 tolerance, %PO intake of meals


                                                 , and BM.

## 2022-09-22 NOTE — PROGRESS NOTE
Assessment and Plan





End-stage renal disease on hemodialysis


Uremia, resolved with HD


Hyperkalemia, controlled with HD


Anemia of ESRD


Hyperphosphatemia


Hyperparathyroidism





Patient's mental status improved following dialysis but remains altered today. 

Case has been discussed in detail with him regarding continuation of dialysis 

per report.  He said that he would like to continue chronic treatment with 

dialysis per prior report. However he is now refusing dialysis.  Do note plans 

for hospice care and agree with plan, will continue to review dialysis needs 

with him while inpatient


Note last labs- K 5.5, HCO3 36


Continue binders


Renally dose medications


ESRD diet with 1.2-1. 4 g/kg/d protein intake





Subjective


Date of service: 09/22/22


Principal diagnosis: Encephalopathy


Interval history: 





Resting in bed, disheveled appearing, refusing dialysis again today


Vitals, labs and I/os reviewed


Interdisciplinary notes and consults reviewed





Objective





- Exam


Narrative Exam: 





Constitutional: no acute distress


Head: NC/AT


Neck: supple


Lungs: clear to auscultation


CV: RRR


Abdomen: soft, non-tender, bowel sounds present


Back: nontender


Extremities: no edema, pulses WNL


Skin: intact


Neuro: Awake and alert





- Vital Signs


Vital signs: 


                               Vital Signs - 12hr











  09/22/22 09/22/22 09/22/22





  03:20 08:08 10:00


 


Temperature 98.0 F 98.0 F 


 


Pulse Rate 83 81 


 


Respiratory 16 18 





Rate   


 


Blood Pressure 139/73 138/68 


 


O2 Sat by Pulse 100 100 98





Oximetry   














  09/22/22





  11:19


 


Temperature 98.0 F


 


Pulse Rate 79


 


Respiratory 18





Rate 


 


Blood Pressure 131/69


 


O2 Sat by Pulse 100





Oximetry 














- Lab





                                 09/22/22 05:13





                                 09/22/22 04:00


                             Most recent lab results











Calcium  9.2 mg/dL (8.4-10.2)   09/22/22  04:00    


 


Magnesium  2.30 mg/dL (1.7-2.3)   09/22/22  04:00    














Medications & Allergies





- Medications


Allergies/Adverse Reactions: 


                                    Allergies





No Known Allergies Allergy (Verified 09/15/22 09:46)


   








Home Medications: 


                                Home Medications











 Medication  Instructions  Recorded  Confirmed  Last Taken  Type


 


Aspirin EC [Halfprin EC] 81 mg PO QDAY #30 tablet. 08/25/22 09/15/22 Unknown 

Rx


 


Sevelamer Carbonate [Renvela] 2,400 mg PO AC #60 tablet 08/25/22 09/15/22 

Unknown Rx


 


Zolpidem [Ambien] 5 mg PO QHS PRN #7 tablet 08/25/22 09/15/22 Unknown Rx


 


Metoprolol Tartrate [Lopressor] 100 mg PO BID 09/15/22 09/15/22 Unknown History


 


levETIRAcetam [Keppra TAB] 500 mg PO BID 09/15/22 09/15/22 Unknown History











Active Medications: 














Generic Name Dose Route Start Last Admin





  Trade Name Freq  PRN Reason Stop Dose Admin


 


Acetaminophen  650 mg  09/14/22 02:56 





  Acetaminophen 325 Mg Tab  PO  





  Q4H PRN  





  Pain MILD(1-3)/Fever >100.5/HA  


 


Aspirin  81 mg  09/14/22 10:00  09/22/22 10:13





  Aspirin Ec 81 Mg Tab  PO   81 mg





  QDAY LASHA   Administration


 


Dextrose  50 ml  09/14/22 02:56  09/14/22 11:34





  Dextrose 50% In Water (25gm) 50 Ml Syringe  IV   50 ml





  Q30MIN PRN   Administration





  Hypoglycemia  





  Protocol  


 


Sodium Chloride  100 mls @ 999 mls/hr  09/22/22 09:00 





  Nacl 0.9%  IV  





  EUSEBIA PRN  





  Hypotension  


 


Insulin Human Lispro  0 unit  09/14/22 07:30  09/22/22 09:06





  Insulin Lispro 100 Unit/Ml  SUB-Q   Not Given





  ACHS LASHA  





  Protocol  


 


Levetiracetam  500 mg  09/14/22 10:00  09/22/22 10:13





  Levetiracetam 500 Mg Tab  PO   500 mg





  BID LASHA   Administration


 


Metoclopramide HCl  10 mg  09/14/22 18:00 





  Metoclopramide 10 Mg/2 Ml Inj  IV  





  Q6H PRN  





  Nausea And Vomiting  


 


Metoprolol Tartrate  50 mg  09/17/22 22:00  09/22/22 10:13





  Metoprolol Tartrate 50 Mg Tab  PO   50 mg





  BID LASHA   Administration


 


Morphine Sulfate  2 mg  09/14/22 02:56  09/14/22 15:13





  Morphine 2 Mg/1 Ml Inj  IV   2 mg





  Q4H PRN   Administration





  Pain, Moderate (4-6)  


 


Morphine Sulfate  4 mg  09/14/22 02:56 





  Morphine 4 Mg/1 Ml Inj  IV  





  Q4H PRN  





  Pain , Severe (7-10)  


 


Ondansetron HCl  4 mg  09/14/22 02:56 





  Ondansetron 4 Mg/2 Ml Inj  IV  





  Q8H PRN  





  Nausea And Vomiting  


 


Sevelamer Carbonate  2,400 mg  09/14/22 07:30  09/22/22 10:13





  Sevelamer Carbonate 800 Mg Tab  PO   2,400 mg





  AC LASHA   Administration


 


Sodium Chloride  10 ml  09/14/22 10:00  09/22/22 10:13





  Sodium Chloride 0.9% 10 Ml Flush Syringe  IV   10 ml





  BID LASHA   Administration


 


Sodium Chloride  10 ml  09/14/22 02:56 





  Sodium Chloride 0.9% 10 Ml Flush Syringe  IV  





  PRN PRN  





  LINE FLUSH  


 


Zolpidem Tartrate  5 mg  09/14/22 22:00 





  Zolpidem 5 Mg Tab  PO  





  QHS PRN  





  Sleep

## 2022-09-23 RX ADMIN — INSULIN LISPRO SCH: 100 INJECTION, SOLUTION INTRAVENOUS; SUBCUTANEOUS at 22:45

## 2022-09-23 RX ADMIN — INSULIN LISPRO SCH: 100 INJECTION, SOLUTION INTRAVENOUS; SUBCUTANEOUS at 08:00

## 2022-09-23 RX ADMIN — Medication SCH: at 17:50

## 2022-09-23 RX ADMIN — METOPROLOL TARTRATE SCH MG: 50 TABLET, FILM COATED ORAL at 10:05

## 2022-09-23 RX ADMIN — SEVELAMER CARBONATE SCH: 800 TABLET, FILM COATED ORAL at 17:47

## 2022-09-23 RX ADMIN — ASPIRIN SCH MG: 81 TABLET, COATED ORAL at 10:05

## 2022-09-23 RX ADMIN — INSULIN LISPRO SCH: 100 INJECTION, SOLUTION INTRAVENOUS; SUBCUTANEOUS at 12:00

## 2022-09-23 RX ADMIN — Medication SCH: at 21:49

## 2022-09-23 RX ADMIN — SEVELAMER CARBONATE SCH MG: 800 TABLET, FILM COATED ORAL at 10:05

## 2022-09-23 RX ADMIN — INSULIN LISPRO SCH UNIT: 100 INJECTION, SOLUTION INTRAVENOUS; SUBCUTANEOUS at 22:46

## 2022-09-23 RX ADMIN — INSULIN LISPRO SCH: 100 INJECTION, SOLUTION INTRAVENOUS; SUBCUTANEOUS at 17:49

## 2022-09-23 RX ADMIN — METOPROLOL TARTRATE SCH MG: 50 TABLET, FILM COATED ORAL at 21:48

## 2022-09-23 RX ADMIN — SEVELAMER CARBONATE SCH MG: 800 TABLET, FILM COATED ORAL at 17:46

## 2022-09-23 NOTE — PROGRESS NOTE
Assessment and Plan





End-stage renal disease on hemodialysis


Uremia, resolved with HD


Hyperkalemia, controlled with HD


Anemia of ESRD


Hyperphosphatemia


Hyperparathyroidism





Patient's mental status improved following dialysis but remains altered now. 

Case has been discussed in detail with him regarding continuation of dialysis 

per report.  He said that he would like to continue chronic treatment with 

dialysis per prior report. However he is now refusing dialysis.  Do note plans 

for hospice care and agree with plan, will continue to review dialysis needs 

with him while inpatient


Note last labs- K 5.5, HCO3 36- has HD orders if patient is amenable and can 

safely be dialyzed


Continue binders


Renally dose medications


ESRD diet with 1.2-1. 4 g/kg/d protein intake





Subjective


Date of service: 09/23/22


Principal diagnosis: Encephalopathy


Interval history: 





Resting in bed, disheveled appearing, continues to refuse dialysis regularly, 

not amenable to much discussion


Vitals, labs and I/os reviewed


Interdisciplinary notes and consults reviewed





Objective





- Exam


Narrative Exam: 





Constitutional: no acute distress


Head: NC/AT


Neck: supple


Lungs: clear to auscultation


CV: RRR


Abdomen: soft, non-tender, bowel sounds present


Back: nontender


Extremities: no edema, pulses WNL


Skin: intact


Neuro: Awake and alert





- Vital Signs


Vital signs: 


                               Vital Signs - 12hr











  09/23/22 09/23/22





  03:37 07:41


 


Temperature 97.6 F 98.0 F


 


Pulse Rate 83 82


 


Respiratory 19 18





Rate  


 


Blood Pressure 144/74 131/68


 


O2 Sat by Pulse 96 100





Oximetry  














- Lab





                                 09/22/22 05:13





                                 09/22/22 04:00


                             Most recent lab results











Calcium  9.2 mg/dL (8.4-10.2)   09/22/22  04:00    


 


Magnesium  2.30 mg/dL (1.7-2.3)   09/22/22  04:00    














Medications & Allergies





- Medications


Allergies/Adverse Reactions: 


                                    Allergies





No Known Allergies Allergy (Verified 09/15/22 09:46)


   








Home Medications: 


                                Home Medications











 Medication  Instructions  Recorded  Confirmed  Last Taken  Type


 


Aspirin EC [Halfprin EC] 81 mg PO QDAY #30 tablet. 08/25/22 09/15/22 Unknown 

Rx


 


Sevelamer Carbonate [Renvela] 2,400 mg PO AC #60 tablet 08/25/22 09/15/22 

Unknown Rx


 


Zolpidem [Ambien] 5 mg PO QHS PRN #7 tablet 08/25/22 09/15/22 Unknown Rx


 


Metoprolol Tartrate [Lopressor] 100 mg PO BID 09/15/22 09/15/22 Unknown History


 


levETIRAcetam [Keppra TAB] 500 mg PO BID 09/15/22 09/15/22 Unknown History











Active Medications: 














Generic Name Dose Route Start Last Admin





  Trade Name Freq  PRN Reason Stop Dose Admin


 


Acetaminophen  650 mg  09/14/22 02:56 





  Acetaminophen 325 Mg Tab  PO  





  Q4H PRN  





  Pain MILD(1-3)/Fever >100.5/HA  


 


Aspirin  81 mg  09/14/22 10:00  09/23/22 10:05





  Aspirin Ec 81 Mg Tab  PO   81 mg





  QDAY LASHA   Administration


 


Dextrose  50 ml  09/14/22 02:56  09/14/22 11:34





  Dextrose 50% In Water (25gm) 50 Ml Syringe  IV   50 ml





  Q30MIN PRN   Administration





  Hypoglycemia  





  Protocol  


 


Sodium Chloride  100 mls @ 999 mls/hr  09/22/22 09:00 





  Nacl 0.9%  IV  





  EUSEBIA PRN  





  Hypotension  


 


Insulin Human Lispro  0 unit  09/14/22 07:30  09/22/22 18:14





  Insulin Lispro 100 Unit/Ml  SUB-Q   Not Given





  ACHS LASHA  





  Protocol  


 


Levetiracetam  500 mg  09/14/22 10:00  09/23/22 10:05





  Levetiracetam 500 Mg Tab  PO   500 mg





  BID LASHA   Administration


 


Metoclopramide HCl  10 mg  09/14/22 18:00 





  Metoclopramide 10 Mg/2 Ml Inj  IV  





  Q6H PRN  





  Nausea And Vomiting  


 


Metoprolol Tartrate  50 mg  09/17/22 22:00  09/23/22 10:05





  Metoprolol Tartrate 50 Mg Tab  PO   50 mg





  BID LASHA   Administration


 


Morphine Sulfate  2 mg  09/14/22 02:56  09/14/22 15:13





  Morphine 2 Mg/1 Ml Inj  IV   2 mg





  Q4H PRN   Administration





  Pain, Moderate (4-6)  


 


Morphine Sulfate  4 mg  09/14/22 02:56 





  Morphine 4 Mg/1 Ml Inj  IV  





  Q4H PRN  





  Pain , Severe (7-10)  


 


Ondansetron HCl  4 mg  09/14/22 02:56 





  Ondansetron 4 Mg/2 Ml Inj  IV  





  Q8H PRN  





  Nausea And Vomiting  


 


Sevelamer Carbonate  2,400 mg  09/14/22 07:30  09/23/22 10:05





  Sevelamer Carbonate 800 Mg Tab  PO   2,400 mg





  AC LASHA   Administration


 


Sodium Chloride  10 ml  09/14/22 10:00  09/22/22 21:08





  Sodium Chloride 0.9% 10 Ml Flush Syringe  IV   Not Given





  BID LASHA  


 


Sodium Chloride  10 ml  09/14/22 02:56 





  Sodium Chloride 0.9% 10 Ml Flush Syringe  IV  





  PRN PRN  





  LINE FLUSH  


 


Zolpidem Tartrate  5 mg  09/14/22 22:00 





  Zolpidem 5 Mg Tab  PO  





  QHS PRN  





  Sleep

## 2022-09-23 NOTE — PROGRESS NOTE
Assessment and Plan


Assessment and plan: 


1.  End-stage renal disease on dialysis


Nephrology following, hemodialysis per schedule


Avoid nephrotoxin, renal dosing of medications





2.  Noncompliance  with dialysis


Counseling done strongly advised to comply with medications diet


Hemodialysis and follow-up doctors visits


Patient continues to refuse dialysis


Nephrology aware





3.  Hyperkalemia;


Improved, closely monitor electrolytes





4.  Hypertension; moderate control


Continue current antihypertensives and as needed medications





5.  Possible underlying dementia


Supportive care





6--Patient DNR and hospice





Discharge planning; per case management


Possible discharge to SNF with hospice


Pending authorization








Advance care planning; +30 minutes


I discussed in detail with the patient his condition, discussed his diagnosis


I discussed with him his prognosis, I discussed with him the treatment plan


I also discussed with him the discharge planning, the plan soft placing in a 

nursing home with hospice


He verbalized understanding and had some questions answered all of them





Closely monitor the patient and adjust the management as needed


Plan of care reviewed with the patient and his nurse


I also discussed extensively with case management


Pending authorization





9/21/22;


Pending authorization for SNF placement


DC planning per case management





9/22 pending placement SNF with hospice


Case management team processing


Pending authorization





9/23; pending SNF placement

















History


Interval history: 


I have seen and examined the patient at the bedside


Patient's chart and medications reviewed


Patient continues to refuse hemodialysis


Anxious to go home


Vital signs noted








Hospitalist Physical





- Constitutional


Vitals: 


                                        











Temp Pulse Resp BP Pulse Ox


 


 98.0 F   82   18   131/68   100 


 


 09/23/22 07:41  09/23/22 07:41  09/23/22 07:41  09/23/22 07:41  09/23/22 07:41











General appearance: Present: no acute distress, cachectic, disheveled, other 

(malnourished)





- EENT


Eyes: Present: PERRL, EOM intact





- Neck


Neck: Present: supple, normal ROM





- Respiratory


Respiratory effort: normal


Respiratory: bilateral: diminished, negative: rales, rhonchi, wheezing





- Cardiovascular


Rhythm: regular


Heart Sounds: Present: S1 & S2





- Extremities


Extremities: no ischemia, No edema





- Abdominal


General gastrointestinal: soft, non-tender, non-distended, normal bowel sounds





- Integumentary


Integumentary: Present: clear, warm





- Psychiatric


Psychiatric: appropriate mood/affect, cooperative





- Neurologic


Neurologic: CNII-XII intact, moves all extremities





Results





- Labs


CBC & Chem 7: 


                                 09/22/22 05:13





                                 09/22/22 04:00


Labs: 


                             Laboratory Last Values











WBC  7.8 K/mm3 (4.5-11.0)   09/17/22  20:59    


 


RBC  2.50 M/mm3 (3.65-5.03)  L  09/17/22  20:59    


 


Hgb  7.4 gm/dl (11.8-15.2)  L  09/22/22  05:13    


 


Hct  22.4 % (35.5-45.6)  L  09/22/22  05:13    


 


MCV  93 fl (84-94)   09/17/22  20:59    


 


MCH  32 pg (28-32)   09/17/22  20:59    


 


MCHC  34 % (32-34)   09/17/22  20:59    


 


RDW  15.6 % (13.2-15.2)  H  09/17/22  20:59    


 


Plt Count  182 K/mm3 (140-440)   09/17/22  20:59    


 


Lymph % (Auto)  18.0 % (13.4-35.0)   09/17/22  20:59    


 


Mono % (Auto)  10.7 % (0.0-7.3)  H  09/17/22  20:59    


 


Eos % (Auto)  1.6 % (0.0-4.3)   09/17/22  20:59    


 


Baso % (Auto)  0.7 % (0.0-1.8)   09/17/22  20:59    


 


Lymph # (Auto)  1.4 K/mm3 (1.2-5.4)   09/17/22  20:59    


 


Mono # (Auto)  0.8 K/mm3 (0.0-0.8)   09/17/22  20:59    


 


Eos # (Auto)  0.1 K/mm3 (0.0-0.4)   09/17/22  20:59    


 


Baso # (Auto)  0.1 K/mm3 (0.0-0.1)   09/17/22  20:59    


 


Seg Neutrophils %  69.0 % (40.0-70.0)   09/17/22  20:59    


 


Seg Neutrophils #  5.4 K/mm3 (1.8-7.7)   09/17/22  20:59    


 


Sodium  142 mmol/L (137-145)   09/22/22  04:00    


 


Potassium  5.5 mmol/L (3.6-5.0)  H  09/22/22  04:00    


 


Chloride  91.1 mmol/L ()  L  09/22/22  04:00    


 


Carbon Dioxide  36 mmol/L (22-30)  H  09/22/22  04:00    


 


Anion Gap  20 mmol/L  09/22/22  04:00    


 


BUN  83 mg/dL (9-20)  H  09/22/22  04:00    


 


Creatinine  11.7 mg/dL (0.8-1.3)  H  09/22/22  04:00    


 


Estimated GFR  6 ml/min  09/22/22  04:00    


 


BUN/Creatinine Ratio  7 %  09/22/22  04:00    


 


Glucose  59 mg/dL ()  L  09/22/22  04:00    


 


POC Glucose  117 mg/dL ()  H  09/23/22  07:41    


 


Calcium  9.2 mg/dL (8.4-10.2)   09/22/22  04:00    


 


Magnesium  2.30 mg/dL (1.7-2.3)   09/22/22  04:00    


 


Total Bilirubin  0.30 mg/dL (0.1-1.2)   09/13/22  22:48    


 


AST  12 units/L (5-40)   09/13/22  22:48    


 


ALT  26 units/L (7-56)   09/13/22  22:48    


 


Alkaline Phosphatase  122 units/L ()   09/13/22  22:48    


 


Total Protein  6.9 g/dL (6.3-8.2)   09/13/22  22:48    


 


Albumin  3.8 g/dL (3.9-5)  L  09/13/22  22:48    


 


Albumin/Globulin Ratio  1.2 %  09/13/22  22:48    


 


Coronavirus (PCR)  Negative  (Negative)   09/16/22  10:00    


 


SARS-CoV-2 (PCR)  Negative  (Negative)   09/20/22  11:34    


 


Hepatitis A IgM Ab  Non-reactive  (NonReactive)   09/14/22  08:31    


 


Hep Bs Antigen  Non-reactive  (Negative)   09/14/22  08:31    


 


Hep B Core IgM Ab  Non-reactive  (NonReactive)   09/14/22  08:31    


 


Hepatitis C Antibody  Non-reactive  (NonReactive)   09/14/22  08:31    











Petty/IV: 


                                        





Voiding Method                   Toilet











Active Medications





- Current Medications


Current Medications: 














Generic Name Dose Route Start Last Admin





  Trade Name Freq  PRN Reason Stop Dose Admin


 


Acetaminophen  650 mg  09/14/22 02:56 





  Acetaminophen 325 Mg Tab  PO  





  Q4H PRN  





  Pain MILD(1-3)/Fever >100.5/HA  


 


Aspirin  81 mg  09/14/22 10:00  09/22/22 10:13





  Aspirin Ec 81 Mg Tab  PO   81 mg





  QDAY LASHA   Administration


 


Dextrose  50 ml  09/14/22 02:56  09/14/22 11:34





  Dextrose 50% In Water (25gm) 50 Ml Syringe  IV   50 ml





  Q30MIN PRN   Administration





  Hypoglycemia  





  Protocol  


 


Sodium Chloride  100 mls @ 999 mls/hr  09/22/22 09:00 





  Nacl 0.9%  IV  





  EUSEBIA PRN  





  Hypotension  


 


Insulin Human Lispro  0 unit  09/14/22 07:30  09/22/22 18:14





  Insulin Lispro 100 Unit/Ml  SUB-Q   Not Given





  ACHS LASHA  





  Protocol  


 


Levetiracetam  500 mg  09/14/22 10:00  09/22/22 21:01





  Levetiracetam 500 Mg Tab  PO   500 mg





  BID LASHA   Administration


 


Metoclopramide HCl  10 mg  09/14/22 18:00 





  Metoclopramide 10 Mg/2 Ml Inj  IV  





  Q6H PRN  





  Nausea And Vomiting  


 


Metoprolol Tartrate  50 mg  09/17/22 22:00  09/22/22 21:01





  Metoprolol Tartrate 50 Mg Tab  PO   50 mg





  BID LASHA   Administration


 


Morphine Sulfate  2 mg  09/14/22 02:56  09/14/22 15:13





  Morphine 2 Mg/1 Ml Inj  IV   2 mg





  Q4H PRN   Administration





  Pain, Moderate (4-6)  


 


Morphine Sulfate  4 mg  09/14/22 02:56 





  Morphine 4 Mg/1 Ml Inj  IV  





  Q4H PRN  





  Pain , Severe (7-10)  


 


Ondansetron HCl  4 mg  09/14/22 02:56 





  Ondansetron 4 Mg/2 Ml Inj  IV  





  Q8H PRN  





  Nausea And Vomiting  


 


Sevelamer Carbonate  2,400 mg  09/14/22 07:30  09/22/22 18:15





  Sevelamer Carbonate 800 Mg Tab  PO   Not Given





  AC LASHA  


 


Sodium Chloride  10 ml  09/14/22 10:00  09/22/22 21:08





  Sodium Chloride 0.9% 10 Ml Flush Syringe  IV   Not Given





  BID LASHA  


 


Sodium Chloride  10 ml  09/14/22 02:56 





  Sodium Chloride 0.9% 10 Ml Flush Syringe  IV  





  PRN PRN  





  LINE FLUSH  


 


Zolpidem Tartrate  5 mg  09/14/22 22:00 





  Zolpidem 5 Mg Tab  PO  





  QHS PRN  





  Sleep  














Nutrition/Malnutrition Assess





- Dietary Evaluation


Nutrition/Malnutrition Findings: 


                                        





Nutrition Notes                                            Start:  09/15/22 

10:48


Freq:                                                      Status: Active       




Protocol:                                                                       




 Document     09/22/22 14:25  GLORIA  (Rec: 09/22/22 14:43  GLORIA  [id number])


 Nutrition Notes


     Initial or Follow up                        Assessment


     Current Diagnosis                           CKD (stage V CKD),Diabetes,


                                                 Hypertension


     Other Pertinent Diagnosis                   AMS, r/o Dementia, ESRD+HD,


                                                 Hyperkalemia,


                                                 Hyperphosphatemia, Anemia, ...


     Current Diet                                Renal Diet (since B 09/14).


     Labs/Tests                                  09/22: K 5.5, Cl 91.1, CO2 36,


                                                 BUN 83, Crea 11.7, Glu 59.


     Pertinent Medications                       09/22: Renvela.


     Height                                      5 ft 7 in


     Weight                                      56.1 kg


     Ideal Body Weight (kg)                      67.27


     BMI                                         19.3


     Weight change and time frame                3 Kg body weight gain reported


                                                 in 1 week.


     Weight Status                               Appropriate


     Subjective/Other Information                RD consult for routine F/U on


                                                 dietary advancement.


                                                 Diet continues as prescribed,


                                                 but Dietary Supplements were


                                                 discontinued, No reports


                                                 available on Pt's PO intake of


                                                 meals at the time, will


                                                 assess at F/U.


                                                 Pt is on Room Air, O2


                                                 saturation @ 98%, according to


                                                 Vital Signs notes.


                                                 Pt is awaiting for SNF


                                                 placement, according to


                                                 Progress notes.


     Percent of energy/protein needs met:        Prescribed Renal Diet provides


                                                 for energy/protein needs (2,


                                                 072 Kcal/77 g) during LOS.


     Burn                                        Absent


     Trauma                                      Absent


     GI Symptoms                                 None


     Food Allergy                                No


     Skin Integrity/Comment                      Assessment WNL.


     Minimum of two criteria                     No


     Fluid Accumulation                          N/A


     Reduced  Strength                       N/A (non-severe)


     Protein-Calorie Malnutrition                N\A


     #1


      Nutrition Diagnosis                        Predicted suboptimal energy


                                                 intake


      Comments:                                  Diet continues as prescribed,


                                                 but Dietary Supplements were


                                                 discontinued, No reports


                                                 available on Pt's PO intake of


                                                 meals at the time, will


                                                 assess at F/U.


      Diagnosis Progress(for reassessment        Resolved


       documentation)                            


     Is patient on ventilator?                   No


     Is Patient Ambulatory and/or Out of Bed     Yes


     REE-([facility]. Jeor-ambulatory/OOB) [     [phone]


      [email or web address]]                               


     Calculation Used for Recommendations        [facility]


     Additional Notes                            Protein: >1.2 g/Kg ABW; >64 g/


                                                 day.


                                                 Fluids: 1 ml/Kcal, or as per


                                                 MD.


 Nutrition Intervention


     Change Diet Order:                          Continue Renal Diet as


                                                 tolerated.


     Add Supplement/Snack (indicate name/kcal    Discontinued.


      /protein )                                 


     Goal #1                                     Adjust the dietary


                                                 intervention to better serve


                                                 Pt's energy/protein needs and


                                                 clinical conditions during LOS


                                                 .


     Follow-Up By:                               09/29/22


     Additional Comments                         Continue monitoring food


                                                 tolerance, %PO intake of meals


                                                 , and BM.

## 2022-09-24 RX ADMIN — METOPROLOL TARTRATE SCH MG: 50 TABLET, FILM COATED ORAL at 09:00

## 2022-09-24 RX ADMIN — SEVELAMER CARBONATE SCH MG: 800 TABLET, FILM COATED ORAL at 09:00

## 2022-09-24 RX ADMIN — ASPIRIN SCH MG: 81 TABLET, COATED ORAL at 09:01

## 2022-09-24 RX ADMIN — INSULIN LISPRO SCH UNIT: 100 INJECTION, SOLUTION INTRAVENOUS; SUBCUTANEOUS at 22:29

## 2022-09-24 RX ADMIN — METOPROLOL TARTRATE SCH MG: 50 TABLET, FILM COATED ORAL at 22:28

## 2022-09-24 RX ADMIN — INSULIN LISPRO SCH: 100 INJECTION, SOLUTION INTRAVENOUS; SUBCUTANEOUS at 13:14

## 2022-09-24 RX ADMIN — SEVELAMER CARBONATE SCH MG: 800 TABLET, FILM COATED ORAL at 13:17

## 2022-09-24 RX ADMIN — Medication SCH ML: at 09:02

## 2022-09-24 RX ADMIN — INSULIN LISPRO SCH UNIT: 100 INJECTION, SOLUTION INTRAVENOUS; SUBCUTANEOUS at 09:01

## 2022-09-24 RX ADMIN — Medication SCH: at 22:29

## 2022-09-24 RX ADMIN — SEVELAMER CARBONATE SCH MG: 800 TABLET, FILM COATED ORAL at 16:25

## 2022-09-24 RX ADMIN — INSULIN LISPRO SCH: 100 INJECTION, SOLUTION INTRAVENOUS; SUBCUTANEOUS at 16:56

## 2022-09-24 NOTE — PROGRESS NOTE
Assessment and Plan


Assessment and plan: 


1.  End-stage renal disease on dialysis


Nephrology following, hemodialysis per schedule


Avoid nephrotoxin, renal dosing of medications





2.  Noncompliance  with dialysis


Counseling done strongly advised to comply with medications diet


Hemodialysis and follow-up doctors visits


Patient continues to refuse dialysis


Nephrology aware





3.  Hyperkalemia;


Improved, closely monitor electrolytes





4.  Hypertension; moderate control


Continue current antihypertensives and as needed medications





5.  Possible underlying dementia


Supportive care





6--Patient DNR and hospice





Discharge planning; per case management


Possible discharge to SNF with hospice


Pending authorization








Advance care planning; +30 minutes


I discussed in detail with the patient his condition, discussed his diagnosis


I discussed with him his prognosis, I discussed with him the treatment plan


I also discussed with him the discharge planning, the plan soft placing in a 

nursing home with hospice


He verbalized understanding and had some questions answered all of them





Closely monitor the patient and adjust the management as needed


Plan of care reviewed with the patient and his nurse


I also discussed extensively with case management


Pending authorization





9/21/22;


Pending authorization for SNF placement


DC planning per case management





9/22 pending placement SNF with hospice


Case management team processing


Pending authorization





9/23; pending SNF placement





9/24; patient is refusing dialysis


Awaiting SNF/hospice placement














History


Interval history: 


Seen and examined the patient at the bedside


Patient's chart and medications reviewed


Patient feels slightly better


Anxious to go home


Vital signs noted








Hospitalist Physical





- Constitutional


Vitals: 


                                        











Temp Pulse Resp BP Pulse Ox


 


 98.0 F   85   18   112/53   98 


 


 09/24/22 16:39  09/24/22 16:39  09/24/22 16:39  09/24/22 16:39  09/24/22 16:39











General appearance: Present: no acute distress, cachectic, disheveled, other 

(malnourished)





- EENT


Eyes: Present: PERRL, EOM intact





- Neck


Neck: Present: supple, normal ROM





- Respiratory


Respiratory effort: normal


Respiratory: bilateral: diminished, negative: rales, rhonchi, wheezing





- Cardiovascular


Rhythm: regular


Heart Sounds: Present: S1 & S2





- Extremities


Extremities: no ischemia, No edema





- Abdominal


General gastrointestinal: soft, non-tender, non-distended, normal bowel sounds





- Integumentary


Integumentary: Present: clear, warm





- Psychiatric


Psychiatric: appropriate mood/affect, cooperative





- Neurologic


Neurologic: CNII-XII intact, moves all extremities





Results





- Labs


CBC & Chem 7: 


                                 09/22/22 05:13





                                 09/22/22 04:00


Labs: 


                             Laboratory Last Values











WBC  7.8 K/mm3 (4.5-11.0)   09/17/22  20:59    


 


RBC  2.50 M/mm3 (3.65-5.03)  L  09/17/22  20:59    


 


Hgb  7.4 gm/dl (11.8-15.2)  L  09/22/22  05:13    


 


Hct  22.4 % (35.5-45.6)  L  09/22/22  05:13    


 


MCV  93 fl (84-94)   09/17/22  20:59    


 


MCH  32 pg (28-32)   09/17/22  20:59    


 


MCHC  34 % (32-34)   09/17/22  20:59    


 


RDW  15.6 % (13.2-15.2)  H  09/17/22  20:59    


 


Plt Count  182 K/mm3 (140-440)   09/17/22  20:59    


 


Lymph % (Auto)  18.0 % (13.4-35.0)   09/17/22  20:59    


 


Mono % (Auto)  10.7 % (0.0-7.3)  H  09/17/22  20:59    


 


Eos % (Auto)  1.6 % (0.0-4.3)   09/17/22  20:59    


 


Baso % (Auto)  0.7 % (0.0-1.8)   09/17/22  20:59    


 


Lymph # (Auto)  1.4 K/mm3 (1.2-5.4)   09/17/22  20:59    


 


Mono # (Auto)  0.8 K/mm3 (0.0-0.8)   09/17/22  20:59    


 


Eos # (Auto)  0.1 K/mm3 (0.0-0.4)   09/17/22  20:59    


 


Baso # (Auto)  0.1 K/mm3 (0.0-0.1)   09/17/22  20:59    


 


Seg Neutrophils %  69.0 % (40.0-70.0)   09/17/22  20:59    


 


Seg Neutrophils #  5.4 K/mm3 (1.8-7.7)   09/17/22  20:59    


 


Sodium  142 mmol/L (137-145)   09/22/22  04:00    


 


Potassium  5.5 mmol/L (3.6-5.0)  H  09/22/22  04:00    


 


Chloride  91.1 mmol/L ()  L  09/22/22  04:00    


 


Carbon Dioxide  36 mmol/L (22-30)  H  09/22/22  04:00    


 


Anion Gap  20 mmol/L  09/22/22  04:00    


 


BUN  83 mg/dL (9-20)  H  09/22/22  04:00    


 


Creatinine  11.7 mg/dL (0.8-1.3)  H  09/22/22  04:00    


 


Estimated GFR  6 ml/min  09/22/22  04:00    


 


BUN/Creatinine Ratio  7 %  09/22/22  04:00    


 


Glucose  59 mg/dL ()  L  09/22/22  04:00    


 


POC Glucose  157 mg/dL ()  H  09/24/22  16:38    


 


Calcium  9.2 mg/dL (8.4-10.2)   09/22/22  04:00    


 


Magnesium  2.30 mg/dL (1.7-2.3)   09/22/22  04:00    


 


Total Bilirubin  0.30 mg/dL (0.1-1.2)   09/13/22  22:48    


 


AST  12 units/L (5-40)   09/13/22  22:48    


 


ALT  26 units/L (7-56)   09/13/22  22:48    


 


Alkaline Phosphatase  122 units/L ()   09/13/22  22:48    


 


Total Protein  6.9 g/dL (6.3-8.2)   09/13/22  22:48    


 


Albumin  3.8 g/dL (3.9-5)  L  09/13/22  22:48    


 


Albumin/Globulin Ratio  1.2 %  09/13/22  22:48    


 


Coronavirus (PCR)  Negative  (Negative)   09/16/22  10:00    


 


SARS-CoV-2 (PCR)  Negative  (Negative)   09/20/22  11:34    


 


Hepatitis A IgM Ab  Non-reactive  (NonReactive)   09/14/22  08:31    


 


Hep Bs Antigen  Non-reactive  (Negative)   09/14/22  08:31    


 


Hep B Core IgM Ab  Non-reactive  (NonReactive)   09/14/22  08:31    


 


Hepatitis C Antibody  Non-reactive  (NonReactive)   09/14/22  08:31    











Petty/IV: 


                                        





Voiding Method                   Toilet











Active Medications





- Current Medications


Current Medications: 














Generic Name Dose Route Start Last Admin





  Trade Name Freq  PRN Reason Stop Dose Admin


 


Acetaminophen  650 mg  09/14/22 02:56 





  Acetaminophen 325 Mg Tab  PO  





  Q4H PRN  





  Pain MILD(1-3)/Fever >100.5/HA  


 


Aspirin  81 mg  09/14/22 10:00  09/24/22 09:01





  Aspirin Ec 81 Mg Tab  PO   81 mg





  QDAY LASHA   Administration


 


Dextrose  50 ml  09/14/22 02:56  09/14/22 11:34





  Dextrose 50% In Water (25gm) 50 Ml Syringe  IV   50 ml





  Q30MIN PRN   Administration





  Hypoglycemia  





  Protocol  


 


Sodium Chloride  100 mls @ 999 mls/hr  09/22/22 09:00 





  Nacl 0.9%  IV  





  EUSEBIA PRN  





  Hypotension  


 


Insulin Human Lispro  0 unit  09/14/22 07:30  09/24/22 16:56





  Insulin Lispro 100 Unit/Ml  SUB-Q   Not Given





  [facility]  





  Protocol  


 


Levetiracetam  500 mg  09/14/22 10:00  09/24/22 09:00





  Levetiracetam 500 Mg Tab  PO   500 mg





  BID LASHA   Administration


 


Metoclopramide HCl  10 mg  09/14/22 18:00 





  Metoclopramide 10 Mg/2 Ml Inj  IV  





  Q6H PRN  





  Nausea And Vomiting  


 


Metoprolol Tartrate  50 mg  09/17/22 22:00  09/24/22 09:00





  Metoprolol Tartrate 50 Mg Tab  PO   50 mg





  BID LASHA   Administration


 


Morphine Sulfate  2 mg  09/14/22 02:56  09/14/22 15:13





  Morphine 2 Mg/1 Ml Inj  IV   2 mg





  Q4H PRN   Administration





  Pain, Moderate (4-6)  


 


Morphine Sulfate  4 mg  09/14/22 02:56 





  Morphine 4 Mg/1 Ml Inj  IV  





  Q4H PRN  





  Pain , Severe (7-10)  


 


Ondansetron HCl  4 mg  09/14/22 02:56 





  Ondansetron 4 Mg/2 Ml Inj  IV  





  Q8H PRN  





  Nausea And Vomiting  


 


Sevelamer Carbonate  2,400 mg  09/14/22 07:30  09/24/22 16:25





  Sevelamer Carbonate 800 Mg Tab  PO   2,400 mg





  AC LASHA   Administration


 


Sodium Chloride  10 ml  09/14/22 10:00  09/24/22 09:02





  Sodium Chloride 0.9% 10 Ml Flush Syringe  IV   10 ml





  BID LASHA   Administration


 


Sodium Chloride  10 ml  09/14/22 02:56 





  Sodium Chloride 0.9% 10 Ml Flush Syringe  IV  





  PRN PRN  





  LINE FLUSH  


 


Zolpidem Tartrate  5 mg  09/14/22 22:00 





  Zolpidem 5 Mg Tab  PO  





  QHS PRN  





  Sleep  














Nutrition/Malnutrition Assess





- Dietary Evaluation


Nutrition/Malnutrition Findings: 


                                        





Nutrition Notes                                            Start:  09/15/22 

10:48


Freq:                                                      Status: Active       




Protocol:                                                                       




 Document     09/22/22 14:25  GLORIA  (Rec: 09/22/22 14:43  GLORIA  [id number])


 Nutrition Notes


     Initial or Follow up                        Assessment


     Current Diagnosis                           CKD (stage V CKD),Diabetes,


                                                 Hypertension


     Other Pertinent Diagnosis                   AMS, r/o Dementia, ESRD+HD,


                                                 Hyperkalemia,


                                                 Hyperphosphatemia, Anemia, ...


     Current Diet                                Renal Diet (since B 09/14).


     Labs/Tests                                  09/22: K 5.5, Cl 91.1, CO2 36,


                                                 BUN 83, Crea 11.7, Glu 59.


     Pertinent Medications                       09/22: Renvela.


     Height                                      5 ft 7 in


     Weight                                      56.1 kg


     Ideal Body Weight (kg)                      67.27


     BMI                                         19.3


     Weight change and time frame                3 Kg body weight gain reported


                                                 in 1 week.


     Weight Status                               Appropriate


     Subjective/Other Information                RD consult for routine F/U on


                                                 dietary advancement.


                                                 Diet continues as prescribed,


                                                 but Dietary Supplements were


                                                 discontinued, No reports


                                                 available on Pt's PO intake of


                                                 meals at the time, will


                                                 assess at F/U.


                                                 Pt is on Room Air, O2


                                                 saturation @ 98%, according to


                                                 Vital Signs notes.


                                                 Pt is awaiting for SNF


                                                 placement, according to


                                                 Progress notes.


     Percent of energy/protein needs met:        Prescribed Renal Diet provides


                                                 for energy/protein needs (2,


                                                 072 Kcal/77 g) during LOS.


     Burn                                        Absent


     Trauma                                      Absent


     GI Symptoms                                 None


     Food Allergy                                No


     Skin Integrity/Comment                      Assessment WNL.


     Minimum of two criteria                     No


     Fluid Accumulation                          N/A


     Reduced  Strength                       N/A (non-severe)


     Protein-Calorie Malnutrition                N\A


     #1


      Nutrition Diagnosis                        Predicted suboptimal energy


                                                 intake


      Comments:                                  Diet continues as prescribed,


                                                 but Dietary Supplements were


                                                 discontinued, No reports


                                                 available on Pt's PO intake of


                                                 meals at the time, will


                                                 assess at F/U.


      Diagnosis Progress(for reassessment        Resolved


       documentation)                            


     Is patient on ventilator?                   No


     Is Patient Ambulatory and/or Out of Bed     Yes


     [facility] [     [phone]


      [email or web address]]                               


     Calculation Used for Recommendations        [facility]


     Additional Notes                            Protein: >1.2 g/Kg ABW; >64 g/


                                                 day.


                                                 Fluids: 1 ml/Kcal, or as per


                                                 MD.


 Nutrition Intervention


     Change Diet Order:                          Continue Renal Diet as


                                                 tolerated.


     Add Supplement/Snack (indicate name/kcal    Discontinued.


      /protein )                                 


     Goal #1                                     Adjust the dietary


                                                 intervention to better serve


                                                 Pt's energy/protein needs and


                                                 clinical conditions during LOS


                                                 .


     Follow-Up By:                               09/29/22


     Additional Comments                         Continue monitoring food


                                                 tolerance, %PO intake of meals


                                                 , and BM.

## 2022-09-24 NOTE — PROGRESS NOTE
Assessment and Plan





End-stage renal disease on hemodialysis


Uremia, resolved with HD


Hyperkalemia, controlled with HD


Anemia of ESRD


Hyperphosphatemia


Hyperparathyroidism





Patient's mental status improved following dialysis but remains altered now. 

Case has been discussed in detail with him regarding continuation of dialysis 

per report.  He said that he would like to continue chronic treatment with 

dialysis per prior report. However he is now refusing dialysis.  Do note plans 

for hospice care and agree with plan, will continue to review dialysis needs 

with him while inpatient


Note last labs- K 5.5, HCO3 36- has HD orders if patient is amenable and can 

safely be dialyzed, will continue to offer as he personally has not admitted to 

wanting hospice care


Continue binders


Renally dose medications


ESRD diet with 1.2-1. 4 g/kg/d protein intake





Subjective


Date of service: 09/24/22


Principal diagnosis: Encephalopathy


Interval history: 





Resting in bed, disheveled appearing, continues to refuse dialysis regularly, 

not amenable to much discussion


Vitals, labs and I/os reviewed


Interdisciplinary notes and consults reviewed





Objective





- Exam


Narrative Exam: 





Constitutional: no acute distress


Head: NC/AT


Neck: supple


Lungs: clear to auscultation


CV: RRR


Abdomen: soft, non-tender, bowel sounds present


Back: nontender


Extremities: no edema, pulses WNL


Skin: intact


Neuro: Awake and alert





- Vital Signs


Vital signs: 


                               Vital Signs - 12hr











  09/24/22 09/24/22 09/24/22





  04:02 08:42 10:00


 


Temperature 97.9 F 98.2 F 


 


Pulse Rate 92 H 88 


 


Pulse Rate [   88





Right Radial]   


 


Respiratory 18 18 18





Rate   


 


Blood Pressure 162/81 125/58 


 


O2 Sat by Pulse 99 100 100





Oximetry   














- Lab





                                 09/22/22 05:13





                                 09/22/22 04:00


                             Most recent lab results











Calcium  9.2 mg/dL (8.4-10.2)   09/22/22  04:00    


 


Magnesium  2.30 mg/dL (1.7-2.3)   09/22/22  04:00    














Medications & Allergies





- Medications


Allergies/Adverse Reactions: 


                                    Allergies





No Known Allergies Allergy (Verified 09/15/22 09:46)


   








Home Medications: 


                                Home Medications











 Medication  Instructions  Recorded  Confirmed  Last Taken  Type


 


Aspirin EC [Halfprin EC] 81 mg PO QDAY #30 tablet. 08/25/22 09/15/22 Unknown 

Rx


 


Sevelamer Carbonate [Renvela] 2,400 mg PO AC #60 tablet 08/25/22 09/15/22 

Unknown Rx


 


Zolpidem [Ambien] 5 mg PO QHS PRN #7 tablet 08/25/22 09/15/22 Unknown Rx


 


Metoprolol Tartrate [Lopressor] 100 mg PO BID 09/15/22 09/15/22 Unknown History


 


levETIRAcetam [Keppra TAB] 500 mg PO BID 09/15/22 09/15/22 Unknown History











Active Medications: 














Generic Name Dose Route Start Last Admin





  Trade Name Freq  PRN Reason Stop Dose Admin


 


Acetaminophen  650 mg  09/14/22 02:56 





  Acetaminophen 325 Mg Tab  PO  





  Q4H PRN  





  Pain MILD(1-3)/Fever >100.5/HA  


 


Aspirin  81 mg  09/14/22 10:00  09/24/22 09:01





  Aspirin Ec 81 Mg Tab  PO   81 mg





  QDAY LASHA   Administration


 


Dextrose  50 ml  09/14/22 02:56  09/14/22 11:34





  Dextrose 50% In Water (25gm) 50 Ml Syringe  IV   50 ml





  Q30MIN PRN   Administration





  Hypoglycemia  





  Protocol  


 


Sodium Chloride  100 mls @ 999 mls/hr  09/22/22 09:00 





  Nacl 0.9%  IV  





  EUSEBIA PRN  





  Hypotension  


 


Insulin Human Lispro  0 unit  09/14/22 07:30  09/24/22 13:14





  Insulin Lispro 100 Unit/Ml  SUB-Q   Not Given





  [facility] LASHA  





  Protocol  


 


Levetiracetam  500 mg  09/14/22 10:00  09/24/22 09:00





  Levetiracetam 500 Mg Tab  PO   500 mg





  BID LASHA   Administration


 


Metoclopramide HCl  10 mg  09/14/22 18:00 





  Metoclopramide 10 Mg/2 Ml Inj  IV  





  Q6H PRN  





  Nausea And Vomiting  


 


Metoprolol Tartrate  50 mg  09/17/22 22:00  09/24/22 09:00





  Metoprolol Tartrate 50 Mg Tab  PO   50 mg





  BID LASHA   Administration


 


Morphine Sulfate  2 mg  09/14/22 02:56  09/14/22 15:13





  Morphine 2 Mg/1 Ml Inj  IV   2 mg





  Q4H PRN   Administration





  Pain, Moderate (4-6)  


 


Morphine Sulfate  4 mg  09/14/22 02:56 





  Morphine 4 Mg/1 Ml Inj  IV  





  Q4H PRN  





  Pain , Severe (7-10)  


 


Ondansetron HCl  4 mg  09/14/22 02:56 





  Ondansetron 4 Mg/2 Ml Inj  IV  





  Q8H PRN  





  Nausea And Vomiting  


 


Sevelamer Carbonate  2,400 mg  09/14/22 07:30  09/24/22 13:17





  Sevelamer Carbonate 800 Mg Tab  PO   2,400 mg





  AC LASHA   Administration


 


Sodium Chloride  10 ml  09/14/22 10:00  09/24/22 09:02





  Sodium Chloride 0.9% 10 Ml Flush Syringe  IV   10 ml





  BID LASHA   Administration


 


Sodium Chloride  10 ml  09/14/22 02:56 





  Sodium Chloride 0.9% 10 Ml Flush Syringe  IV  





  PRN PRN  





  LINE FLUSH  


 


Zolpidem Tartrate  5 mg  09/14/22 22:00 





  Zolpidem 5 Mg Tab  PO  





  QHS PRN  





  Sleep

## 2022-09-25 RX ADMIN — Medication SCH: at 22:47

## 2022-09-25 RX ADMIN — Medication SCH: at 09:42

## 2022-09-25 RX ADMIN — METOPROLOL TARTRATE SCH MG: 50 TABLET, FILM COATED ORAL at 22:01

## 2022-09-25 RX ADMIN — INSULIN LISPRO SCH UNIT: 100 INJECTION, SOLUTION INTRAVENOUS; SUBCUTANEOUS at 22:02

## 2022-09-25 RX ADMIN — INSULIN LISPRO SCH: 100 INJECTION, SOLUTION INTRAVENOUS; SUBCUTANEOUS at 16:18

## 2022-09-25 RX ADMIN — SEVELAMER CARBONATE SCH: 800 TABLET, FILM COATED ORAL at 16:18

## 2022-09-25 RX ADMIN — SEVELAMER CARBONATE SCH MG: 800 TABLET, FILM COATED ORAL at 12:51

## 2022-09-25 RX ADMIN — INSULIN LISPRO SCH: 100 INJECTION, SOLUTION INTRAVENOUS; SUBCUTANEOUS at 08:55

## 2022-09-25 RX ADMIN — INSULIN LISPRO SCH UNIT: 100 INJECTION, SOLUTION INTRAVENOUS; SUBCUTANEOUS at 12:51

## 2022-09-25 RX ADMIN — ASPIRIN SCH MG: 81 TABLET, COATED ORAL at 09:40

## 2022-09-25 RX ADMIN — SEVELAMER CARBONATE SCH: 800 TABLET, FILM COATED ORAL at 08:55

## 2022-09-25 RX ADMIN — METOPROLOL TARTRATE SCH MG: 50 TABLET, FILM COATED ORAL at 09:40

## 2022-09-25 NOTE — PROGRESS NOTE
Assessment and Plan


Assessment and plan: 


1.  End-stage renal disease on dialysis


Nephrology following, hemodialysis per schedule


Avoid nephrotoxin, renal dosing of medications





2.  Noncompliance  with dialysis


Counseling done strongly advised to comply with medications diet


Hemodialysis and follow-up doctors visits


Patient continues to refuse dialysis


Nephrology aware





3.  Hyperkalemia;


Improved, closely monitor electrolytes





4.  Hypertension; moderate control


Continue current antihypertensives and as needed medications





5.  Possible underlying dementia


Supportive care





6--Patient DNR and hospice





Discharge planning; per case management


Possible discharge to SNF with hospice


Pending authorization








Advance care planning; +30 minutes


I discussed in detail with the patient his condition, discussed his diagnosis


I discussed with him his prognosis, I discussed with him the treatment plan


I also discussed with him the discharge planning, the plan soft placing in a 

nursing home with hospice


He verbalized understanding and had some questions answered all of them





Closely monitor the patient and adjust the management as needed


Plan of care reviewed with the patient and his nurse


I also discussed extensively with case management


Pending authorization





9/21/22;


Pending authorization for SNF placement


DC planning per case management





9/22 pending placement SNF with hospice


Case management team processing


Pending authorization





9/23; pending SNF placement





9/24; patient is refusing dialysis


Awaiting SNF/hospice placement


9/25, refused dialysis awaiting placement

















History


Interval history: 


No new complaints


No new events reported by the nursing








Hospitalist Physical





- Constitutional


Vitals: 


                                        











Temp Pulse Resp BP Pulse Ox


 


 97.7 F   86   18   130/70   100 


 


 09/25/22 16:02  09/25/22 16:02  09/25/22 16:02  09/25/22 16:02  09/25/22 16:02











General appearance: Present: no acute distress, cachectic, disheveled, other 

(malnourished)





- EENT


Eyes: Present: PERRL, EOM intact





- Neck


Neck: Present: supple, normal ROM





- Respiratory


Respiratory effort: normal


Respiratory: bilateral: diminished, negative: rales, rhonchi, wheezing





- Cardiovascular


Rhythm: regular


Heart Sounds: Present: S1 & S2





- Extremities


Extremities: no ischemia, No edema





- Abdominal


General gastrointestinal: soft, non-tender, non-distended, normal bowel sounds





- Integumentary


Integumentary: Present: clear, warm





- Psychiatric


Psychiatric: appropriate mood/affect, cooperative





- Neurologic


Neurologic: CNII-XII intact, moves all extremities





Results





- Labs


CBC & Chem 7: 


                                 09/22/22 05:13





                                 09/22/22 04:00


Labs: 


                             Laboratory Last Values











WBC  7.8 K/mm3 (4.5-11.0)   09/17/22  20:59    


 


RBC  2.50 M/mm3 (3.65-5.03)  L  09/17/22  20:59    


 


Hgb  7.4 gm/dl (11.8-15.2)  L  09/22/22  05:13    


 


Hct  22.4 % (35.5-45.6)  L  09/22/22  05:13    


 


MCV  93 fl (84-94)   09/17/22  20:59    


 


MCH  32 pg (28-32)   09/17/22  20:59    


 


MCHC  34 % (32-34)   09/17/22  20:59    


 


RDW  15.6 % (13.2-15.2)  H  09/17/22  20:59    


 


Plt Count  182 K/mm3 (140-440)   09/17/22  20:59    


 


Lymph % (Auto)  18.0 % (13.4-35.0)   09/17/22  20:59    


 


Mono % (Auto)  10.7 % (0.0-7.3)  H  09/17/22  20:59    


 


Eos % (Auto)  1.6 % (0.0-4.3)   09/17/22  20:59    


 


Baso % (Auto)  0.7 % (0.0-1.8)   09/17/22  20:59    


 


Lymph # (Auto)  1.4 K/mm3 (1.2-5.4)   09/17/22  20:59    


 


Mono # (Auto)  0.8 K/mm3 (0.0-0.8)   09/17/22  20:59    


 


Eos # (Auto)  0.1 K/mm3 (0.0-0.4)   09/17/22  20:59    


 


Baso # (Auto)  0.1 K/mm3 (0.0-0.1)   09/17/22  20:59    


 


Seg Neutrophils %  69.0 % (40.0-70.0)   09/17/22  20:59    


 


Seg Neutrophils #  5.4 K/mm3 (1.8-7.7)   09/17/22  20:59    


 


Sodium  142 mmol/L (137-145)   09/22/22  04:00    


 


Potassium  5.5 mmol/L (3.6-5.0)  H  09/22/22  04:00    


 


Chloride  91.1 mmol/L ()  L  09/22/22  04:00    


 


Carbon Dioxide  36 mmol/L (22-30)  H  09/22/22  04:00    


 


Anion Gap  20 mmol/L  09/22/22  04:00    


 


BUN  83 mg/dL (9-20)  H  09/22/22  04:00    


 


Creatinine  11.7 mg/dL (0.8-1.3)  H  09/22/22  04:00    


 


Estimated GFR  6 ml/min  09/22/22  04:00    


 


BUN/Creatinine Ratio  7 %  09/22/22  04:00    


 


Glucose  59 mg/dL ()  L  09/22/22  04:00    


 


POC Glucose  111 mg/dL ()  H  09/25/22  16:01    


 


Calcium  9.2 mg/dL (8.4-10.2)   09/22/22  04:00    


 


Magnesium  2.30 mg/dL (1.7-2.3)   09/22/22  04:00    


 


Total Bilirubin  0.30 mg/dL (0.1-1.2)   09/13/22  22:48    


 


AST  12 units/L (5-40)   09/13/22  22:48    


 


ALT  26 units/L (7-56)   09/13/22  22:48    


 


Alkaline Phosphatase  122 units/L ()   09/13/22  22:48    


 


Total Protein  6.9 g/dL (6.3-8.2)   09/13/22  22:48    


 


Albumin  3.8 g/dL (3.9-5)  L  09/13/22  22:48    


 


Albumin/Globulin Ratio  1.2 %  09/13/22  22:48    


 


Coronavirus (PCR)  Negative  (Negative)   09/16/22  10:00    


 


SARS-CoV-2 (PCR)  Negative  (Negative)   09/20/22  11:34    


 


Hepatitis A IgM Ab  Non-reactive  (NonReactive)   09/14/22  08:31    


 


Hep Bs Antigen  Non-reactive  (Negative)   09/14/22  08:31    


 


Hep B Core IgM Ab  Non-reactive  (NonReactive)   09/14/22  08:31    


 


Hepatitis C Antibody  Non-reactive  (NonReactive)   09/14/22  08:31    











Petty/IV: 


                                        





Voiding Method                   Toilet











Active Medications





- Current Medications


Current Medications: 














Generic Name Dose Route Start Last Admin





  Trade Name Freq  PRN Reason Stop Dose Admin


 


Acetaminophen  650 mg  09/14/22 02:56 





  Acetaminophen 325 Mg Tab  PO  





  Q4H PRN  





  Pain MILD(1-3)/Fever >100.5/HA  


 


Aspirin  81 mg  09/14/22 10:00  09/25/22 09:40





  Aspirin Ec 81 Mg Tab  PO   81 mg





  QDAY LASHA   Administration


 


Dextrose  50 ml  09/14/22 02:56  09/14/22 11:34





  Dextrose 50% In Water (25gm) 50 Ml Syringe  IV   50 ml





  Q30MIN PRN   Administration





  Hypoglycemia  





  Protocol  


 


Sodium Chloride  100 mls @ 999 mls/hr  09/22/22 09:00 





  Nacl 0.9%  IV  





  EUSEBIA PRN  





  Hypotension  


 


Insulin Human Lispro  0 unit  09/14/22 07:30  09/25/22 16:18





  Insulin Lispro 100 Unit/Ml  SUB-Q   Not Given





  [facility]  





  Protocol  


 


Levetiracetam  500 mg  09/14/22 10:00  09/25/22 09:40





  Levetiracetam 500 Mg Tab  PO   500 mg





  BID LASHA   Administration


 


Metoclopramide HCl  10 mg  09/14/22 18:00 





  Metoclopramide 10 Mg/2 Ml Inj  IV  





  Q6H PRN  





  Nausea And Vomiting  


 


Metoprolol Tartrate  50 mg  09/17/22 22:00  09/25/22 09:40





  Metoprolol Tartrate 50 Mg Tab  PO   50 mg





  BID LASHA   Administration


 


Morphine Sulfate  2 mg  09/14/22 02:56  09/14/22 15:13





  Morphine 2 Mg/1 Ml Inj  IV   2 mg





  Q4H PRN   Administration





  Pain, Moderate (4-6)  


 


Morphine Sulfate  4 mg  09/14/22 02:56 





  Morphine 4 Mg/1 Ml Inj  IV  





  Q4H PRN  





  Pain , Severe (7-10)  


 


Ondansetron HCl  4 mg  09/14/22 02:56 





  Ondansetron 4 Mg/2 Ml Inj  IV  





  Q8H PRN  





  Nausea And Vomiting  


 


Sevelamer Carbonate  2,400 mg  09/14/22 07:30  09/25/22 16:18





  Sevelamer Carbonate 800 Mg Tab  PO   Not Given





  AC LASHA  


 


Sodium Chloride  10 ml  09/14/22 10:00  09/25/22 09:42





  Sodium Chloride 0.9% 10 Ml Flush Syringe  IV   Not Given





  BID LASHA  


 


Sodium Chloride  10 ml  09/14/22 02:56 





  Sodium Chloride 0.9% 10 Ml Flush Syringe  IV  





  PRN PRN  





  LINE FLUSH  


 


Zolpidem Tartrate  5 mg  09/14/22 22:00 





  Zolpidem 5 Mg Tab  PO  





  QHS PRN  





  Sleep  














Nutrition/Malnutrition Assess





- Dietary Evaluation


Nutrition/Malnutrition Findings: 


                                        





Nutrition Notes                                            Start:  09/15/22 

10:48


Freq:                                                      Status: Active       




Protocol:                                                                       




 Document     09/22/22 14:25  GLORIA  (Rec: 09/22/22 14:43  GLORIA  [id number])


 Nutrition Notes


     Initial or Follow up                        Assessment


     Current Diagnosis                           CKD (stage V CKD),Diabetes,


                                                 Hypertension


     Other Pertinent Diagnosis                   AMS, r/o Dementia, ESRD+HD,


                                                 Hyperkalemia,


                                                 Hyperphosphatemia, Anemia, ...


     Current Diet                                Renal Diet (since B 09/14).


     Labs/Tests                                  09/22: K 5.5, Cl 91.1, CO2 36,


                                                 BUN 83, Crea 11.7, Glu 59.


     Pertinent Medications                       09/22: Renvela.


     Height                                      5 ft 7 in


     Weight                                      56.1 kg


     Ideal Body Weight (kg)                      67.27


     BMI                                         19.3


     Weight change and time frame                3 Kg body weight gain reported


                                                 in 1 week.


     Weight Status                               Appropriate


     Subjective/Other Information                RD consult for routine F/U on


                                                 dietary advancement.


                                                 Diet continues as prescribed,


                                                 but Dietary Supplements were


                                                 discontinued, No reports


                                                 available on Pt's PO intake of


                                                 meals at the time, will


                                                 assess at F/U.


                                                 Pt is on Room Air, O2


                                                 saturation @ 98%, according to


                                                 Vital Signs notes.


                                                 Pt is awaiting for SNF


                                                 placement, according to


                                                 Progress notes.


     Percent of energy/protein needs met:        Prescribed Renal Diet provides


                                                 for energy/protein needs (2,


                                                 072 Kcal/77 g) during LOS.


     Burn                                        Absent


     Trauma                                      Absent


     GI Symptoms                                 None


     Food Allergy                                No


     Skin Integrity/Comment                      Assessment WNL.


     Minimum of two criteria                     No


     Fluid Accumulation                          N/A


     Reduced  Strength                       N/A (non-severe)


     Protein-Calorie Malnutrition                N\A


     #1


      Nutrition Diagnosis                        Predicted suboptimal energy


                                                 intake


      Comments:                                  Diet continues as prescribed,


                                                 but Dietary Supplements were


                                                 discontinued, No reports


                                                 available on Pt's PO intake of


                                                 meals at the time, will


                                                 assess at F/U.


      Diagnosis Progress(for reassessment        Resolved


       documentation)                            


     Is patient on ventilator?                   No


     Is Patient Ambulatory and/or Out of Bed     Yes


     REE-(Morral-St. Jeor-ambulatory/OOB) [     [id number]


      [id number]]                               


     Calculation Used for Recommendations        Morral-St Jeor


     Additional Notes                            Protein: >1.2 g/Kg ABW; >64 g/


                                                 day.


                                                 Fluids: 1 ml/Kcal, or as per


                                                 MD.


 Nutrition Intervention


     Change Diet Order:                          Continue Renal Diet as


                                                 tolerated.


     Add Supplement/Snack (indicate name/kcal    Discontinued.


      /protein )                                 


     Goal #1                                     Adjust the dietary


                                                 intervention to better serve


                                                 Pt's energy/protein needs and


                                                 clinical conditions during LOS


                                                 .


     Follow-Up By:                               09/29/22


     Additional Comments                         Continue monitoring food


                                                 tolerance, %PO intake of meals


                                                 , and BM.

## 2022-09-25 NOTE — PROGRESS NOTE
Assessment and Plan


Assessment and plan: 


1.  End-stage renal disease on dialysis


Nephrology following, hemodialysis per schedule


Avoid nephrotoxin, renal dosing of medications





2.  Noncompliance  with dialysis


Counseling done strongly advised to comply with medications diet


Hemodialysis and follow-up doctors visits


Patient continues to refuse dialysis


Nephrology aware





3.  Hyperkalemia;


Improved, closely monitor electrolytes





4.  Hypertension; moderate control


Continue current antihypertensives and as needed medications





5.  Possible underlying dementia


Supportive care





6--Patient DNR and hospice





Discharge planning; per case management


Possible discharge to SNF with hospice


Pending authorization








Advance care planning; +30 minutes


I discussed in detail with the patient his condition, discussed his diagnosis


I discussed with him his prognosis, I discussed with him the treatment plan


I also discussed with him the discharge planning, the plan soft placing in a 

nursing home with hospice


He verbalized understanding and had some questions answered all of them





Closely monitor the patient and adjust the management as needed


Plan of care reviewed with the patient and his nurse


I also discussed extensively with case management


Pending authorization





9/21/22;


Pending authorization for SNF placement


DC planning per case management





9/22 pending placement SNF with hospice


Case management team processing


Pending authorization





9/23; pending SNF placement





9/24; patient is refusing dialysis


Awaiting SNF/hospice placement





9/25; patient continues to refuse dialysis


Awaiting placement SNF/hospice

















History


Interval history: 


I have seen and examined the patient this afternoon


Patient's chart and medications reviewed


Patient feels better no new complaints


No new overnight events reported by the nursing


Vital signs stable








Hospitalist Physical





- Constitutional


Vitals: 


                                        











Temp Pulse Resp BP Pulse Ox


 


 97.7 F   86   18   130/70   100 


 


 09/25/22 16:02  09/25/22 16:02  09/25/22 16:02  09/25/22 16:02  09/25/22 16:02











General appearance: Present: no acute distress, cachectic, disheveled, other 

(malnourished)





- EENT


Eyes: Present: PERRL, EOM intact





- Neck


Neck: Present: supple, normal ROM





- Respiratory


Respiratory effort: normal


Respiratory: bilateral: diminished, negative: rales, rhonchi, wheezing





- Cardiovascular


Rhythm: regular


Heart Sounds: Present: S1 & S2





- Extremities


Extremities: no ischemia, pulses intact





- Abdominal


General gastrointestinal: soft, non-tender, non-distended, normal bowel sounds





- Integumentary


Integumentary: Present: clear, warm





- Psychiatric


Psychiatric: appropriate mood/affect, cooperative





- Neurologic


Neurologic: CNII-XII intact, moves all extremities





Results





- Labs


CBC & Chem 7: 


                                 09/22/22 05:13





                                 09/22/22 04:00


Labs: 


                             Laboratory Last Values











WBC  7.8 K/mm3 (4.5-11.0)   09/17/22  20:59    


 


RBC  2.50 M/mm3 (3.65-5.03)  L  09/17/22  20:59    


 


Hgb  7.4 gm/dl (11.8-15.2)  L  09/22/22  05:13    


 


Hct  22.4 % (35.5-45.6)  L  09/22/22  05:13    


 


MCV  93 fl (84-94)   09/17/22  20:59    


 


MCH  32 pg (28-32)   09/17/22  20:59    


 


MCHC  34 % (32-34)   09/17/22  20:59    


 


RDW  15.6 % (13.2-15.2)  H  09/17/22  20:59    


 


Plt Count  182 K/mm3 (140-440)   09/17/22  20:59    


 


Lymph % (Auto)  18.0 % (13.4-35.0)   09/17/22  20:59    


 


Mono % (Auto)  10.7 % (0.0-7.3)  H  09/17/22  20:59    


 


Eos % (Auto)  1.6 % (0.0-4.3)   09/17/22  20:59    


 


Baso % (Auto)  0.7 % (0.0-1.8)   09/17/22  20:59    


 


Lymph # (Auto)  1.4 K/mm3 (1.2-5.4)   09/17/22  20:59    


 


Mono # (Auto)  0.8 K/mm3 (0.0-0.8)   09/17/22  20:59    


 


Eos # (Auto)  0.1 K/mm3 (0.0-0.4)   09/17/22  20:59    


 


Baso # (Auto)  0.1 K/mm3 (0.0-0.1)   09/17/22  20:59    


 


Seg Neutrophils %  69.0 % (40.0-70.0)   09/17/22  20:59    


 


Seg Neutrophils #  5.4 K/mm3 (1.8-7.7)   09/17/22  20:59    


 


Sodium  142 mmol/L (137-145)   09/22/22  04:00    


 


Potassium  5.5 mmol/L (3.6-5.0)  H  09/22/22  04:00    


 


Chloride  91.1 mmol/L ()  L  09/22/22  04:00    


 


Carbon Dioxide  36 mmol/L (22-30)  H  09/22/22  04:00    


 


Anion Gap  20 mmol/L  09/22/22  04:00    


 


BUN  83 mg/dL (9-20)  H  09/22/22  04:00    


 


Creatinine  11.7 mg/dL (0.8-1.3)  H  09/22/22  04:00    


 


Estimated GFR  6 ml/min  09/22/22  04:00    


 


BUN/Creatinine Ratio  7 %  09/22/22  04:00    


 


Glucose  59 mg/dL ()  L  09/22/22  04:00    


 


POC Glucose  111 mg/dL ()  H  09/25/22  16:01    


 


Calcium  9.2 mg/dL (8.4-10.2)   09/22/22  04:00    


 


Magnesium  2.30 mg/dL (1.7-2.3)   09/22/22  04:00    


 


Total Bilirubin  0.30 mg/dL (0.1-1.2)   09/13/22  22:48    


 


AST  12 units/L (5-40)   09/13/22  22:48    


 


ALT  26 units/L (7-56)   09/13/22  22:48    


 


Alkaline Phosphatase  122 units/L ()   09/13/22  22:48    


 


Total Protein  6.9 g/dL (6.3-8.2)   09/13/22  22:48    


 


Albumin  3.8 g/dL (3.9-5)  L  09/13/22  22:48    


 


Albumin/Globulin Ratio  1.2 %  09/13/22  22:48    


 


Coronavirus (PCR)  Negative  (Negative)   09/16/22  10:00    


 


SARS-CoV-2 (PCR)  Negative  (Negative)   09/20/22  11:34    


 


Hepatitis A IgM Ab  Non-reactive  (NonReactive)   09/14/22  08:31    


 


Hep Bs Antigen  Non-reactive  (Negative)   09/14/22  08:31    


 


Hep B Core IgM Ab  Non-reactive  (NonReactive)   09/14/22  08:31    


 


Hepatitis C Antibody  Non-reactive  (NonReactive)   09/14/22  08:31    











Petty/IV: 


                                        





Voiding Method                   Toilet











Active Medications





- Current Medications


Current Medications: 














Generic Name Dose Route Start Last Admin





  Trade Name Freq  PRN Reason Stop Dose Admin


 


Acetaminophen  650 mg  09/14/22 02:56 





  Acetaminophen 325 Mg Tab  PO  





  Q4H PRN  





  Pain MILD(1-3)/Fever >100.5/HA  


 


Aspirin  81 mg  09/14/22 10:00  09/25/22 09:40





  Aspirin Ec 81 Mg Tab  PO   81 mg





  QDAY LASHA   Administration


 


Dextrose  50 ml  09/14/22 02:56  09/14/22 11:34





  Dextrose 50% In Water (25gm) 50 Ml Syringe  IV   50 ml





  Q30MIN PRN   Administration





  Hypoglycemia  





  Protocol  


 


Sodium Chloride  100 mls @ 999 mls/hr  09/22/22 09:00 





  Nacl 0.9%  IV  





  EUSEBIA PRN  





  Hypotension  


 


Insulin Human Lispro  0 unit  09/14/22 07:30  09/25/22 16:18





  Insulin Lispro 100 Unit/Ml  SUB-Q   Not Given





  [facility]  





  Protocol  


 


Levetiracetam  500 mg  09/14/22 10:00  09/25/22 09:40





  Levetiracetam 500 Mg Tab  PO   500 mg





  BID LASHA   Administration


 


Metoclopramide HCl  10 mg  09/14/22 18:00 





  Metoclopramide 10 Mg/2 Ml Inj  IV  





  Q6H PRN  





  Nausea And Vomiting  


 


Metoprolol Tartrate  50 mg  09/17/22 22:00  09/25/22 09:40





  Metoprolol Tartrate 50 Mg Tab  PO   50 mg





  BID LASHA   Administration


 


Morphine Sulfate  2 mg  09/14/22 02:56  09/14/22 15:13





  Morphine 2 Mg/1 Ml Inj  IV   2 mg





  Q4H PRN   Administration





  Pain, Moderate (4-6)  


 


Morphine Sulfate  4 mg  09/14/22 02:56 





  Morphine 4 Mg/1 Ml Inj  IV  





  Q4H PRN  





  Pain , Severe (7-10)  


 


Ondansetron HCl  4 mg  09/14/22 02:56 





  Ondansetron 4 Mg/2 Ml Inj  IV  





  Q8H PRN  





  Nausea And Vomiting  


 


Sevelamer Carbonate  2,400 mg  09/14/22 07:30  09/25/22 16:18





  Sevelamer Carbonate 800 Mg Tab  PO   Not Given





  AC ALSHA  


 


Sodium Chloride  10 ml  09/14/22 10:00  09/25/22 09:42





  Sodium Chloride 0.9% 10 Ml Flush Syringe  IV   Not Given





  BID LASHA  


 


Sodium Chloride  10 ml  09/14/22 02:56 





  Sodium Chloride 0.9% 10 Ml Flush Syringe  IV  





  PRN PRN  





  LINE FLUSH  


 


Zolpidem Tartrate  5 mg  09/14/22 22:00 





  Zolpidem 5 Mg Tab  PO  





  QHS PRN  





  Sleep  














Nutrition/Malnutrition Assess





- Dietary Evaluation


Nutrition/Malnutrition Findings: 


                                        





Nutrition Notes                                            Start:  09/15/22 

10:48


Freq:                                                      Status: Active       




Protocol:                                                                       




 Document     09/22/22 14:25  GLORIA  (Rec: 09/22/22 14:43  GLORIA  [id number])


 Nutrition Notes


     Initial or Follow up                        Assessment


     Current Diagnosis                           CKD (stage V CKD),Diabetes,


                                                 Hypertension


     Other Pertinent Diagnosis                   AMS, r/o Dementia, ESRD+HD,


                                                 Hyperkalemia,


                                                 Hyperphosphatemia, Anemia, ...


     Current Diet                                Renal Diet (since B 09/14).


     Labs/Tests                                  09/22: K 5.5, Cl 91.1, CO2 36,


                                                 BUN 83, Crea 11.7, Glu 59.


     Pertinent Medications                       09/22: Renvela.


     Height                                      5 ft 7 in


     Weight                                      56.1 kg


     Ideal Body Weight (kg)                      67.27


     BMI                                         19.3


     Weight change and time frame                3 Kg body weight gain reported


                                                 in 1 week.


     Weight Status                               Appropriate


     Subjective/Other Information                RD consult for routine F/U on


                                                 dietary advancement.


                                                 Diet continues as prescribed,


                                                 but Dietary Supplements were


                                                 discontinued, No reports


                                                 available on Pt's PO intake of


                                                 meals at the time, will


                                                 assess at F/U.


                                                 Pt is on Room Air, O2


                                                 saturation @ 98%, according to


                                                 Vital Signs notes.


                                                 Pt is awaiting for SNF


                                                 placement, according to


                                                 Progress notes.


     Percent of energy/protein needs met:        Prescribed Renal Diet provides


                                                 for energy/protein needs (2,


                                                 072 Kcal/77 g) during LOS.


     Burn                                        Absent


     Trauma                                      Absent


     GI Symptoms                                 None


     Food Allergy                                No


     Skin Integrity/Comment                      Assessment WNL.


     Minimum of two criteria                     No


     Fluid Accumulation                          N/A


     Reduced  Strength                       N/A (non-severe)


     Protein-Calorie Malnutrition                N\A


     #1


      Nutrition Diagnosis                        Predicted suboptimal energy


                                                 intake


      Comments:                                  Diet continues as prescribed,


                                                 but Dietary Supplements were


                                                 discontinued, No reports


                                                 available on Pt's PO intake of


                                                 meals at the time, will


                                                 assess at F/U.


      Diagnosis Progress(for reassessment        Resolved


       documentation)                            


     Is patient on ventilator?                   No


     Is Patient Ambulatory and/or Out of Bed     Yes


     [facility] [     [phone]


      [email or web address]]                               


     Calculation Used for Recommendations        [facility]


     Additional Notes                            Protein: >1.2 g/Kg ABW; >64 g/


                                                 day.


                                                 Fluids: 1 ml/Kcal, or as per


                                                 MD.


 Nutrition Intervention


     Change Diet Order:                          Continue Renal Diet as


                                                 tolerated.


     Add Supplement/Snack (indicate name/kcal    Discontinued.


      /protein )                                 


     Goal #1                                     Adjust the dietary


                                                 intervention to better serve


                                                 Pt's energy/protein needs and


                                                 clinical conditions during LOS


                                                 .


     Follow-Up By:                               09/29/22


     Additional Comments                         Continue monitoring food


                                                 tolerance, %PO intake of meals


                                                 , and BM.

## 2022-09-26 RX ADMIN — INSULIN LISPRO SCH: 100 INJECTION, SOLUTION INTRAVENOUS; SUBCUTANEOUS at 10:33

## 2022-09-26 RX ADMIN — SEVELAMER CARBONATE SCH MG: 800 TABLET, FILM COATED ORAL at 10:34

## 2022-09-26 RX ADMIN — SEVELAMER CARBONATE SCH: 800 TABLET, FILM COATED ORAL at 18:56

## 2022-09-26 RX ADMIN — INSULIN LISPRO SCH: 100 INJECTION, SOLUTION INTRAVENOUS; SUBCUTANEOUS at 23:45

## 2022-09-26 RX ADMIN — INSULIN LISPRO SCH: 100 INJECTION, SOLUTION INTRAVENOUS; SUBCUTANEOUS at 18:56

## 2022-09-26 RX ADMIN — Medication SCH: at 23:45

## 2022-09-26 RX ADMIN — SEVELAMER CARBONATE SCH: 800 TABLET, FILM COATED ORAL at 10:33

## 2022-09-26 RX ADMIN — METOPROLOL TARTRATE SCH MG: 50 TABLET, FILM COATED ORAL at 23:44

## 2022-09-26 RX ADMIN — METOPROLOL TARTRATE SCH MG: 50 TABLET, FILM COATED ORAL at 10:34

## 2022-09-26 RX ADMIN — ASPIRIN SCH MG: 81 TABLET, COATED ORAL at 10:34

## 2022-09-26 RX ADMIN — Medication SCH ML: at 10:34

## 2022-09-26 NOTE — PROGRESS NOTE
Assessment and Plan


Assessment and plan: 


1.  End-stage renal disease on dialysis


Nephrology following, hemodialysis per schedule


Avoid nephrotoxin, renal dosing of medications





2.  Noncompliance  with dialysis


Counseling done strongly advised to comply with medications diet


Hemodialysis and follow-up doctors visits


Patient continues to refuse dialysis


Nephrology aware





3.  Hyperkalemia;


Improved, closely monitor electrolytes





4.  Hypertension; moderate control


Continue current antihypertensives and as needed medications





5.  Possible underlying dementia


Supportive care





6--Patient DNR and hospice





Discharge planning; per case management


Possible discharge to SNF with hospice


Pending authorization








Advance care planning; +30 minutes


I discussed in detail with the patient his condition, discussed his diagnosis


I discussed with him his prognosis, I discussed with him the treatment plan


I also discussed with him the discharge planning, the plan soft placing in a 

nursing home with hospice


He verbalized understanding and had some questions answered all of them





Closely monitor the patient and adjust the management as needed


Plan of care reviewed with the patient and his nurse


I also discussed extensively with case management


Pending authorization





9/21/22;


Pending authorization for SNF placement


DC planning per case management





9/22 pending placement SNF with hospice


Case management team processing


Pending authorization





9/23; pending SNF placement





9/24; patient is refusing dialysis


Awaiting SNF/hospice placement


9/25, refused dialysis awaiting placement





9/26; patient DNR awaiting SNF/hospice placement 





disposition; DC planning per case management




















History


Interval history: 


Seen and examined the patient at the bedside


Patient's chart and medications reviewed


No new events reported by the nursing


Patient continues to refuse dialysis every time


Vital signs reviewed


Awaiting placement








Hospitalist Physical





- Constitutional


Vitals: 


                                        











Temp Pulse Resp BP Pulse Ox


 


 98.3 F   88   16   141/78   100 


 


 09/26/22 08:40  09/26/22 08:40  09/26/22 08:40  09/26/22 08:40  09/26/22 08:40











General appearance: Present: no acute distress, cachectic, disheveled, other 

(malnourished)





- EENT


Eyes: Present: PERRL, EOM intact





- Neck


Neck: Present: supple, normal ROM





- Respiratory


Respiratory effort: normal


Respiratory: bilateral: diminished, negative: rales, rhonchi, wheezing





- Cardiovascular


Rhythm: regular


Heart Sounds: Present: S1 & S2





- Extremities


Extremities: no ischemia, No edema





- Abdominal


General gastrointestinal: soft, non-tender, non-distended, normal bowel sounds





- Integumentary


Integumentary: Present: clear, warm





- Psychiatric


Psychiatric: appropriate mood/affect, cooperative





- Neurologic


Neurologic: moves all extremities





Results





- Labs


CBC & Chem 7: 


                                 09/22/22 05:13





                                 09/22/22 04:00


Labs: 


                             Laboratory Last Values











WBC  7.8 K/mm3 (4.5-11.0)   09/17/22  20:59    


 


RBC  2.50 M/mm3 (3.65-5.03)  L  09/17/22  20:59    


 


Hgb  7.4 gm/dl (11.8-15.2)  L  09/22/22  05:13    


 


Hct  22.4 % (35.5-45.6)  L  09/22/22  05:13    


 


MCV  93 fl (84-94)   09/17/22  20:59    


 


MCH  32 pg (28-32)   09/17/22  20:59    


 


MCHC  34 % (32-34)   09/17/22  20:59    


 


RDW  15.6 % (13.2-15.2)  H  09/17/22  20:59    


 


Plt Count  182 K/mm3 (140-440)   09/17/22  20:59    


 


Lymph % (Auto)  18.0 % (13.4-35.0)   09/17/22  20:59    


 


Mono % (Auto)  10.7 % (0.0-7.3)  H  09/17/22  20:59    


 


Eos % (Auto)  1.6 % (0.0-4.3)   09/17/22  20:59    


 


Baso % (Auto)  0.7 % (0.0-1.8)   09/17/22  20:59    


 


Lymph # (Auto)  1.4 K/mm3 (1.2-5.4)   09/17/22  20:59    


 


Mono # (Auto)  0.8 K/mm3 (0.0-0.8)   09/17/22  20:59    


 


Eos # (Auto)  0.1 K/mm3 (0.0-0.4)   09/17/22  20:59    


 


Baso # (Auto)  0.1 K/mm3 (0.0-0.1)   09/17/22  20:59    


 


Seg Neutrophils %  69.0 % (40.0-70.0)   09/17/22  20:59    


 


Seg Neutrophils #  5.4 K/mm3 (1.8-7.7)   09/17/22  20:59    


 


Sodium  142 mmol/L (137-145)   09/22/22  04:00    


 


Potassium  5.5 mmol/L (3.6-5.0)  H  09/22/22  04:00    


 


Chloride  91.1 mmol/L ()  L  09/22/22  04:00    


 


Carbon Dioxide  36 mmol/L (22-30)  H  09/22/22  04:00    


 


Anion Gap  20 mmol/L  09/22/22  04:00    


 


BUN  83 mg/dL (9-20)  H  09/22/22  04:00    


 


Creatinine  11.7 mg/dL (0.8-1.3)  H  09/22/22  04:00    


 


Estimated GFR  6 ml/min  09/22/22  04:00    


 


BUN/Creatinine Ratio  7 %  09/22/22  04:00    


 


Glucose  59 mg/dL ()  L  09/22/22  04:00    


 


POC Glucose  163 mg/dL ()  H  09/25/22  21:10    


 


Calcium  9.2 mg/dL (8.4-10.2)   09/22/22  04:00    


 


Magnesium  2.30 mg/dL (1.7-2.3)   09/22/22  04:00    


 


Total Bilirubin  0.30 mg/dL (0.1-1.2)   09/13/22  22:48    


 


AST  12 units/L (5-40)   09/13/22  22:48    


 


ALT  26 units/L (7-56)   09/13/22  22:48    


 


Alkaline Phosphatase  122 units/L ()   09/13/22  22:48    


 


Total Protein  6.9 g/dL (6.3-8.2)   09/13/22  22:48    


 


Albumin  3.8 g/dL (3.9-5)  L  09/13/22  22:48    


 


Albumin/Globulin Ratio  1.2 %  09/13/22  22:48    


 


Coronavirus (PCR)  Negative  (Negative)   09/16/22  10:00    


 


SARS-CoV-2 (PCR)  Negative  (Negative)   09/20/22  11:34    


 


Hepatitis A IgM Ab  Non-reactive  (NonReactive)   09/14/22  08:31    


 


Hep Bs Antigen  Non-reactive  (Negative)   09/14/22  08:31    


 


Hep B Core IgM Ab  Non-reactive  (NonReactive)   09/14/22  08:31    


 


Hepatitis C Antibody  Non-reactive  (NonReactive)   09/14/22  08:31    











Petty/IV: 


                                        





Voiding Method                   Toilet











Active Medications





- Current Medications


Current Medications: 














Generic Name Dose Route Start Last Admin





  Trade Name Freq  PRN Reason Stop Dose Admin


 


Acetaminophen  650 mg  09/14/22 02:56 





  Acetaminophen 325 Mg Tab  PO  





  Q4H PRN  





  Pain MILD(1-3)/Fever >100.5/HA  


 


Aspirin  81 mg  09/14/22 10:00  09/25/22 09:40





  Aspirin Ec 81 Mg Tab  PO   81 mg





  QDAY LASHA   Administration


 


Dextrose  50 ml  09/14/22 02:56  09/14/22 11:34





  Dextrose 50% In Water (25gm) 50 Ml Syringe  IV   50 ml





  Q30MIN PRN   Administration





  Hypoglycemia  





  Protocol  


 


Sodium Chloride  100 mls @ 999 mls/hr  09/22/22 09:00 





  Nacl 0.9%  IV  





  EUSEBIA PRN  





  Hypotension  


 


Insulin Human Lispro  0 unit  09/14/22 07:30  09/25/22 22:02





  Insulin Lispro 100 Unit/Ml  SUB-Q   2 unit





  ACHS LASHA   Administration





  Protocol  


 


Levetiracetam  500 mg  09/14/22 10:00  09/25/22 22:01





  Levetiracetam 500 Mg Tab  PO   500 mg





  BID LASHA   Administration


 


Metoclopramide HCl  10 mg  09/14/22 18:00 





  Metoclopramide 10 Mg/2 Ml Inj  IV  





  Q6H PRN  





  Nausea And Vomiting  


 


Metoprolol Tartrate  50 mg  09/17/22 22:00  09/25/22 22:01





  Metoprolol Tartrate 50 Mg Tab  PO   50 mg





  BID LASHA   Administration


 


Morphine Sulfate  2 mg  09/14/22 02:56  09/14/22 15:13





  Morphine 2 Mg/1 Ml Inj  IV   2 mg





  Q4H PRN   Administration





  Pain, Moderate (4-6)  


 


Morphine Sulfate  4 mg  09/14/22 02:56 





  Morphine 4 Mg/1 Ml Inj  IV  





  Q4H PRN  





  Pain , Severe (7-10)  


 


Ondansetron HCl  4 mg  09/14/22 02:56 





  Ondansetron 4 Mg/2 Ml Inj  IV  





  Q8H PRN  





  Nausea And Vomiting  


 


Sevelamer Carbonate  2,400 mg  09/14/22 07:30  09/25/22 16:18





  Sevelamer Carbonate 800 Mg Tab  PO   Not Given





  AC LASHA  


 


Sodium Chloride  10 ml  09/14/22 10:00  09/25/22 22:47





  Sodium Chloride 0.9% 10 Ml Flush Syringe  IV   Not Given





  BID LASHA  


 


Sodium Chloride  10 ml  09/14/22 02:56 





  Sodium Chloride 0.9% 10 Ml Flush Syringe  IV  





  PRN PRN  





  LINE FLUSH  


 


Zolpidem Tartrate  5 mg  09/14/22 22:00 





  Zolpidem 5 Mg Tab  PO  





  QHS PRN  





  Sleep  














Nutrition/Malnutrition Assess





- Dietary Evaluation


Nutrition/Malnutrition Findings: 


                                        





Nutrition Notes                                            Start:  09/15/22 

10:48


Freq:                                                      Status: Active       




Protocol:                                                                       




 Document     09/22/22 14:25  GLORIA  (Rec: 09/22/22 14:43  GLORIA  [id number])


 Nutrition Notes


     Initial or Follow up                        Assessment


     Current Diagnosis                           CKD (stage V CKD),Diabetes,


                                                 Hypertension


     Other Pertinent Diagnosis                   AMS, r/o Dementia, ESRD+HD,


                                                 Hyperkalemia,


                                                 Hyperphosphatemia, Anemia, ...


     Current Diet                                Renal Diet (since B 09/14).


     Labs/Tests                                  09/22: K 5.5, Cl 91.1, CO2 36,


                                                 BUN 83, Crea 11.7, Glu 59.


     Pertinent Medications                       09/22: Renvela.


     Height                                      5 ft 7 in


     Weight                                      56.1 kg


     Ideal Body Weight (kg)                      67.27


     BMI                                         19.3


     Weight change and time frame                3 Kg body weight gain reported


                                                 in 1 week.


     Weight Status                               Appropriate


     Subjective/Other Information                RD consult for routine F/U on


                                                 dietary advancement.


                                                 Diet continues as prescribed,


                                                 but Dietary Supplements were


                                                 discontinued, No reports


                                                 available on Pt's PO intake of


                                                 meals at the time, will


                                                 assess at F/U.


                                                 Pt is on Room Air, O2


                                                 saturation @ 98%, according to


                                                 Vital Signs notes.


                                                 Pt is awaiting for SNF


                                                 placement, according to


                                                 Progress notes.


     Percent of energy/protein needs met:        Prescribed Renal Diet provides


                                                 for energy/protein needs (2,


                                                 072 Kcal/77 g) during LOS.


     Burn                                        Absent


     Trauma                                      Absent


     GI Symptoms                                 None


     Food Allergy                                No


     Skin Integrity/Comment                      Assessment WNL.


     Minimum of two criteria                     No


     Fluid Accumulation                          N/A


     Reduced  Strength                       N/A (non-severe)


     Protein-Calorie Malnutrition                N\A


     #1


      Nutrition Diagnosis                        Predicted suboptimal energy


                                                 intake


      Comments:                                  Diet continues as prescribed,


                                                 but Dietary Supplements were


                                                 discontinued, No reports


                                                 available on Pt's PO intake of


                                                 meals at the time, will


                                                 assess at F/U.


      Diagnosis Progress(for reassessment        Resolved


       documentation)                            


     Is patient on ventilator?                   No


     Is Patient Ambulatory and/or Out of Bed     Yes


     REE-([facility]-ambulatory/OOB) [     [phone]


      [email or web address]]                               


     Calculation Used for Recommendations        [facility]


     Additional Notes                            Protein: >1.2 g/Kg ABW; >64 g/


                                                 day.


                                                 Fluids: 1 ml/Kcal, or as per


                                                 MD.


 Nutrition Intervention


     Change Diet Order:                          Continue Renal Diet as


                                                 tolerated.


     Add Supplement/Snack (indicate name/kcal    Discontinued.


      /protein )                                 


     Goal #1                                     Adjust the dietary


                                                 intervention to better serve


                                                 Pt's energy/protein needs and


                                                 clinical conditions during LOS


                                                 .


     Follow-Up By:                               09/29/22


     Additional Comments                         Continue monitoring food


                                                 tolerance, %PO intake of meals


                                                 , and BM.

## 2022-09-26 NOTE — PROGRESS NOTE
Assessment and Plan





Impression


* End-stage renal disease on hemodialysis


* Uremia, resolved with HD


* Hyperkalemia, controlled with HD


* Anemia of ESRD


* Secondary hyperparathyroidism





Plan:


* Continue HD TTS


* UF as tolerated


* Note discharge planning in progress:  SNF vs home w/ hospice care


* Dose medications for renal function


* Renal/HD diet








Subjective


Date of service: 09/26/22


Principal diagnosis: Encephalopathy


Interval history: 





Patient has no complaints today





Objective





- Vital Signs


Vital signs: 


                               Vital Signs - 12hr











  09/26/22 09/26/22 09/26/22





  00:20 05:36 08:40


 


Temperature 98.3 F 98.2 F 98.3 F


 


Pulse Rate 88 90 88


 


Respiratory 18 18 16





Rate   


 


Blood Pressure 128/62  141/78


 


Blood Pressure  126/61 





[Right]   


 


O2 Sat by Pulse 96 97 100





Oximetry   














- General Appearance


General appearance: well-developed, well-nourished


EENT: ATNC


Respiratory: Present: Clear to Ascultation


Cardiology: regular, S1S2


Gastrointestinal: normal, no tenderness, no distended





- Lab





                                 09/22/22 05:13





                                 09/22/22 04:00


                             Most recent lab results











Calcium  9.2 mg/dL (8.4-10.2)   09/22/22  04:00    


 


Magnesium  2.30 mg/dL (1.7-2.3)   09/22/22  04:00    














Medications & Allergies





- Medications


Allergies/Adverse Reactions: 


                                    Allergies





No Known Allergies Allergy (Verified 09/15/22 09:46)


   








Home Medications: 


                                Home Medications











 Medication  Instructions  Recorded  Confirmed  Last Taken  Type


 


Aspirin EC [Halfprin EC] 81 mg PO QDAY #30 tablet. 08/25/22 09/15/22 Unknown 

Rx


 


Sevelamer Carbonate [Renvela] 2,400 mg PO AC #60 tablet 08/25/22 09/15/22 

Unknown Rx


 


Zolpidem [Ambien] 5 mg PO QHS PRN #7 tablet 08/25/22 09/15/22 Unknown Rx


 


Metoprolol Tartrate [Lopressor] 100 mg PO BID 09/15/22 09/15/22 Unknown History


 


levETIRAcetam [Keppra TAB] 500 mg PO BID 09/15/22 09/15/22 Unknown History











Active Medications: 














Generic Name Dose Route Start Last Admin





  Trade Name Freq  PRN Reason Stop Dose Admin


 


Acetaminophen  650 mg  09/14/22 02:56 





  Acetaminophen 325 Mg Tab  PO  





  Q4H PRN  





  Pain MILD(1-3)/Fever >100.5/HA  


 


Aspirin  81 mg  09/14/22 10:00  09/25/22 09:40





  Aspirin Ec 81 Mg Tab  PO   81 mg





  QDAY LASHA   Administration


 


Dextrose  50 ml  09/14/22 02:56  09/14/22 11:34





  Dextrose 50% In Water (25gm) 50 Ml Syringe  IV   50 ml





  Q30MIN PRN   Administration





  Hypoglycemia  





  Protocol  


 


Sodium Chloride  100 mls @ 999 mls/hr  09/22/22 09:00 





  Nacl 0.9%  IV  





  EUSEBIA PRN  





  Hypotension  


 


Insulin Human Lispro  0 unit  09/14/22 07:30  09/25/22 22:02





  Insulin Lispro 100 Unit/Ml  SUB-Q   2 unit





  ACHS LASHA   Administration





  Protocol  


 


Levetiracetam  500 mg  09/14/22 10:00  09/25/22 22:01





  Levetiracetam 500 Mg Tab  PO   500 mg





  BID LASHA   Administration


 


Metoclopramide HCl  10 mg  09/14/22 18:00 





  Metoclopramide 10 Mg/2 Ml Inj  IV  





  Q6H PRN  





  Nausea And Vomiting  


 


Metoprolol Tartrate  50 mg  09/17/22 22:00  09/25/22 22:01





  Metoprolol Tartrate 50 Mg Tab  PO   50 mg





  BID LASHA   Administration


 


Morphine Sulfate  2 mg  09/14/22 02:56  09/14/22 15:13





  Morphine 2 Mg/1 Ml Inj  IV   2 mg





  Q4H PRN   Administration





  Pain, Moderate (4-6)  


 


Morphine Sulfate  4 mg  09/14/22 02:56 





  Morphine 4 Mg/1 Ml Inj  IV  





  Q4H PRN  





  Pain , Severe (7-10)  


 


Ondansetron HCl  4 mg  09/14/22 02:56 





  Ondansetron 4 Mg/2 Ml Inj  IV  





  Q8H PRN  





  Nausea And Vomiting  


 


Sevelamer Carbonate  2,400 mg  09/14/22 07:30  09/25/22 16:18





  Sevelamer Carbonate 800 Mg Tab  PO   Not Given





  AC LASHA  


 


Sodium Chloride  10 ml  09/14/22 10:00  09/25/22 22:47





  Sodium Chloride 0.9% 10 Ml Flush Syringe  IV   Not Given





  BID LASHA  


 


Sodium Chloride  10 ml  09/14/22 02:56 





  Sodium Chloride 0.9% 10 Ml Flush Syringe  IV  





  PRN PRN  





  LINE FLUSH  


 


Zolpidem Tartrate  5 mg  09/14/22 22:00 





  Zolpidem 5 Mg Tab  PO  





  QHS PRN  





  Sleep

## 2022-09-27 RX ADMIN — SEVELAMER CARBONATE SCH: 800 TABLET, FILM COATED ORAL at 12:00

## 2022-09-27 RX ADMIN — METOPROLOL TARTRATE SCH MG: 50 TABLET, FILM COATED ORAL at 09:37

## 2022-09-27 RX ADMIN — SEVELAMER CARBONATE SCH MG: 800 TABLET, FILM COATED ORAL at 09:36

## 2022-09-27 RX ADMIN — SEVELAMER CARBONATE SCH: 800 TABLET, FILM COATED ORAL at 18:39

## 2022-09-27 RX ADMIN — ASPIRIN SCH MG: 81 TABLET, COATED ORAL at 09:36

## 2022-09-27 RX ADMIN — METOPROLOL TARTRATE SCH MG: 50 TABLET, FILM COATED ORAL at 22:27

## 2022-09-27 NOTE — PROGRESS NOTE
Assessment and Plan





Impression


* End-stage renal disease on hemodialysis


* Uremia, resolved with HD


* Hyperkalemia, controlled with HD


* Anemia of ESRD


* Secondary hyperparathyroidism





Plan:


* Patient is refusing dialysis today.  He states that he does not like needles. 

  Advised patient that without dialysis, he will die.  Patient voiced 

  understanding


* Continue HD TTS if patient agreeable


* UF as tolerated


* Note discharge planning in progress:  SNF vs home w/ hospice care


* Dose medications for renal function


* Renal/HD diet








Subjective


Date of service: 09/27/22


Principal diagnosis: Encephalopathy


Interval history: 





Patient has no compaints.  He is refusing dialysis today.  APS at bedside





Objective





- Vital Signs


Vital signs: 


                               Vital Signs - 12hr











  09/26/22 09/27/22





  22:00 03:59


 


Temperature  98.2 F


 


Pulse Rate  86


 


Respiratory  20





Rate  


 


Blood Pressure  137/76


 


O2 Sat by Pulse 96 100





Oximetry  














- General Appearance


General appearance: well-developed, well-nourished


EENT: ATNC


Respiratory: Present: Clear to Ascultation


Cardiology: regular, S1S2


Gastrointestinal: normal, no tenderness, no distended


Integumentary: warm and dry





- Lab





                                 09/22/22 05:13





                                 09/22/22 04:00


                             Most recent lab results











Calcium  9.2 mg/dL (8.4-10.2)   09/22/22  04:00    


 


Magnesium  2.30 mg/dL (1.7-2.3)   09/22/22  04:00    














Medications & Allergies





- Medications


Allergies/Adverse Reactions: 


                                    Allergies





No Known Allergies Allergy (Verified 09/15/22 09:46)


   








Home Medications: 


                                Home Medications











 Medication  Instructions  Recorded  Confirmed  Last Taken  Type


 


Aspirin EC [Halfprin EC] 81 mg PO QDAY #30 tablet. 08/25/22 09/15/22 Unknown 

Rx


 


Sevelamer Carbonate [Renvela] 2,400 mg PO AC #60 tablet 08/25/22 09/15/22 

Unknown Rx


 


Zolpidem [Ambien] 5 mg PO QHS PRN #7 tablet 08/25/22 09/15/22 Unknown Rx


 


Metoprolol Tartrate [Lopressor] 100 mg PO BID 09/15/22 09/15/22 Unknown History


 


levETIRAcetam [Keppra TAB] 500 mg PO BID 09/15/22 09/15/22 Unknown History











Active Medications: 














Generic Name Dose Route Start Last Admin





  Trade Name Freq  PRN Reason Stop Dose Admin


 


Acetaminophen  650 mg  09/14/22 02:56 





  Acetaminophen 325 Mg Tab  PO  





  Q4H PRN  





  Pain MILD(1-3)/Fever >100.5/HA  


 


Aspirin  81 mg  09/14/22 10:00  09/26/22 10:34





  Aspirin Ec 81 Mg Tab  PO   81 mg





  QDAY LASHA   Administration


 


Dextrose  50 ml  09/14/22 02:56  09/14/22 11:34





  Dextrose 50% In Water (25gm) 50 Ml Syringe  IV   50 ml





  Q30MIN PRN   Administration





  Hypoglycemia  





  Protocol  


 


Sodium Chloride  100 mls @ 999 mls/hr  09/22/22 09:00 





  Nacl 0.9%  IV  





  EUSEBIA PRN  





  Hypotension  


 


Insulin Human Lispro  0 unit  09/14/22 07:30  09/26/22 23:45





  Insulin Lispro 100 Unit/Ml  SUB-Q   Not Given





  ACHS [facility]  





  Protocol  


 


Levetiracetam  500 mg  09/14/22 10:00  09/26/22 23:44





  Levetiracetam 500 Mg Tab  PO   500 mg





  BID LASHA   Administration


 


Metoclopramide HCl  10 mg  09/14/22 18:00 





  Metoclopramide 10 Mg/2 Ml Inj  IV  





  Q6H PRN  





  Nausea And Vomiting  


 


Metoprolol Tartrate  50 mg  09/17/22 22:00  09/26/22 23:44





  Metoprolol Tartrate 50 Mg Tab  PO   50 mg





  BID LASHA   Administration


 


Morphine Sulfate  2 mg  09/14/22 02:56  09/14/22 15:13





  Morphine 2 Mg/1 Ml Inj  IV   2 mg





  Q4H PRN   Administration





  Pain, Moderate (4-6)  


 


Morphine Sulfate  4 mg  09/14/22 02:56 





  Morphine 4 Mg/1 Ml Inj  IV  





  Q4H PRN  





  Pain , Severe (7-10)  


 


Ondansetron HCl  4 mg  09/14/22 02:56 





  Ondansetron 4 Mg/2 Ml Inj  IV  





  Q8H PRN  





  Nausea And Vomiting  


 


Sevelamer Carbonate  2,400 mg  09/14/22 07:30  09/26/22 18:56





  Sevelamer Carbonate 800 Mg Tab  PO   Not Given





  AC LASHA  


 


Sodium Chloride  10 ml  09/14/22 10:00  09/26/22 23:45





  Sodium Chloride 0.9% 10 Ml Flush Syringe  IV   Not Given





  BID LASHA  


 


Sodium Chloride  10 ml  09/14/22 02:56 





  Sodium Chloride 0.9% 10 Ml Flush Syringe  IV  





  PRN PRN  





  LINE FLUSH  


 


Zolpidem Tartrate  5 mg  09/14/22 22:00  09/26/22 23:44





  Zolpidem 5 Mg Tab  PO   5 mg





  QHS PRN   Administration





  Sleep

## 2022-09-27 NOTE — PROGRESS NOTE
Assessment and Plan


Assessment and plan: 





End-stage renal disease on dialysis


Nephrology following, hemodialysis per schedule


Avoid nephrotoxin, renal dosing of medications





Noncompliance  with dialysis


Counseling done strongly advised to comply with medications diet


Hemodialysis and follow-up doctors visits


Patient continues to refuse dialysis


Nephrology aware





Hyperkalemia;


Improved, closely monitor electrolytes





Hypertension; moderate control


Continue current antihypertensives and as needed medications





Possible underlying dementia


Supportive care





Patient DNR and hospice





Hospital course:





9/21/22;


Pending authorization for SNF placement


DC planning per case management





9/22 pending placement SNF with hospice


Case management team processing


Pending authorization





9/23; pending SNF placement





9/24; patient is refusing dialysis


Awaiting SNF/hospice placement


9/25, refused dialysis awaiting placement





9/26; patient DNR awaiting SNF/hospice placement 





9/27: Discharge planning; per case management


Possible discharge to SNF with hospice


Pending authorization














History


Interval history: 





No new issues overnight





Hospitalist Physical





- Constitutional


Vitals: 


                                        











Temp Pulse Resp BP Pulse Ox


 


 98.2 F   86   20   137/76   100 


 


 09/27/22 03:59  09/27/22 03:59  09/27/22 03:59  09/27/22 03:59  09/27/22 03:59











General appearance: Present: no acute distress, cachectic, disheveled, other 

(malnourished)





- EENT


Eyes: Present: PERRL, EOM intact


ENT: hearing intact, clear oral mucosa, dentition normal





- Neck


Neck: Present: supple, normal ROM





- Respiratory


Respiratory effort: normal


Respiratory: bilateral: CTA





- Cardiovascular


Rhythm: regular


Heart Sounds: Present: S1 & S2.  Absent: gallop, rub





- Extremities


Extremities: no ischemia, No edema, Full ROM





- Abdominal


General gastrointestinal: soft, non-tender, non-distended, normal bowel sounds





- Integumentary


Integumentary: Present: clear, warm, dry





- Neurologic


Neurologic: CNII-XII intact, moves all extremities





Results





- Labs


CBC & Chem 7: 


                                 09/22/22 05:13





                                 09/22/22 04:00


Labs: 


                             Laboratory Last Values











WBC  7.8 K/mm3 (4.5-11.0)   09/17/22  20:59    


 


RBC  2.50 M/mm3 (3.65-5.03)  L  09/17/22  20:59    


 


Hgb  7.4 gm/dl (11.8-15.2)  L  09/22/22  05:13    


 


Hct  22.4 % (35.5-45.6)  L  09/22/22  05:13    


 


MCV  93 fl (84-94)   09/17/22  20:59    


 


MCH  32 pg (28-32)   09/17/22  20:59    


 


MCHC  34 % (32-34)   09/17/22  20:59    


 


RDW  15.6 % (13.2-15.2)  H  09/17/22  20:59    


 


Plt Count  182 K/mm3 (140-440)   09/17/22  20:59    


 


Lymph % (Auto)  18.0 % (13.4-35.0)   09/17/22  20:59    


 


Mono % (Auto)  10.7 % (0.0-7.3)  H  09/17/22  20:59    


 


Eos % (Auto)  1.6 % (0.0-4.3)   09/17/22  20:59    


 


Baso % (Auto)  0.7 % (0.0-1.8)   09/17/22  20:59    


 


Lymph # (Auto)  1.4 K/mm3 (1.2-5.4)   09/17/22  20:59    


 


Mono # (Auto)  0.8 K/mm3 (0.0-0.8)   09/17/22  20:59    


 


Eos # (Auto)  0.1 K/mm3 (0.0-0.4)   09/17/22  20:59    


 


Baso # (Auto)  0.1 K/mm3 (0.0-0.1)   09/17/22  20:59    


 


Seg Neutrophils %  69.0 % (40.0-70.0)   09/17/22  20:59    


 


Seg Neutrophils #  5.4 K/mm3 (1.8-7.7)   09/17/22  20:59    


 


Sodium  142 mmol/L (137-145)   09/22/22  04:00    


 


Potassium  5.5 mmol/L (3.6-5.0)  H  09/22/22  04:00    


 


Chloride  91.1 mmol/L ()  L  09/22/22  04:00    


 


Carbon Dioxide  36 mmol/L (22-30)  H  09/22/22  04:00    


 


Anion Gap  20 mmol/L  09/22/22  04:00    


 


BUN  83 mg/dL (9-20)  H  09/22/22  04:00    


 


Creatinine  11.7 mg/dL (0.8-1.3)  H  09/22/22  04:00    


 


Estimated GFR  6 ml/min  09/22/22  04:00    


 


BUN/Creatinine Ratio  7 %  09/22/22  04:00    


 


Glucose  59 mg/dL ()  L  09/22/22  04:00    


 


POC Glucose  79 mg/dL ()   09/26/22  23:42    


 


Calcium  9.2 mg/dL (8.4-10.2)   09/22/22  04:00    


 


Magnesium  2.30 mg/dL (1.7-2.3)   09/22/22  04:00    


 


Total Bilirubin  0.30 mg/dL (0.1-1.2)   09/13/22  22:48    


 


AST  12 units/L (5-40)   09/13/22  22:48    


 


ALT  26 units/L (7-56)   09/13/22  22:48    


 


Alkaline Phosphatase  122 units/L ()   09/13/22  22:48    


 


Total Protein  6.9 g/dL (6.3-8.2)   09/13/22  22:48    


 


Albumin  3.8 g/dL (3.9-5)  L  09/13/22  22:48    


 


Albumin/Globulin Ratio  1.2 %  09/13/22  22:48    


 


Coronavirus (PCR)  Negative  (Negative)   09/16/22  10:00    


 


SARS-CoV-2 (PCR)  Negative  (Negative)   09/20/22  11:34    


 


Hepatitis A IgM Ab  Non-reactive  (NonReactive)   09/14/22  08:31    


 


Hep Bs Antigen  Non-reactive  (Negative)   09/14/22  08:31    


 


Hep B Core IgM Ab  Non-reactive  (NonReactive)   09/14/22  08:31    


 


Hepatitis C Antibody  Non-reactive  (NonReactive)   09/14/22  08:31    











Petty/IV: 


                                        





Voiding Method                   Toilet











Active Medications





- Current Medications


Current Medications: 














Generic Name Dose Route Start Last Admin





  Trade Name Freq  PRN Reason Stop Dose Admin


 


Acetaminophen  650 mg  09/14/22 02:56 





  Acetaminophen 325 Mg Tab  PO  





  Q4H PRN  





  Pain MILD(1-3)/Fever >100.5/HA  


 


Aspirin  81 mg  09/14/22 10:00  09/27/22 09:36





  Aspirin Ec 81 Mg Tab  PO   81 mg





  QDAY LASHA   Administration


 


Dextrose  50 ml  09/14/22 02:56  09/14/22 11:34





  Dextrose 50% In Water (25gm) 50 Ml Syringe  IV   50 ml





  Q30MIN PRN   Administration





  Hypoglycemia  





  Protocol  


 


Sodium Chloride  100 mls @ 999 mls/hr  09/22/22 09:00 





  Nacl 0.9%  IV  





  EUSEBIA PRN  





  Hypotension  


 


Insulin Human Lispro  0 unit  09/14/22 07:30  09/26/22 23:45





  Insulin Lispro 100 Unit/Ml  SUB-Q   Not Given





  ACHS LASHA  





  Protocol  


 


Levetiracetam  500 mg  09/14/22 10:00  09/27/22 09:36





  Levetiracetam 500 Mg Tab  PO   500 mg





  BID LASHA   Administration


 


Metoclopramide HCl  10 mg  09/14/22 18:00 





  Metoclopramide 10 Mg/2 Ml Inj  IV  





  Q6H PRN  





  Nausea And Vomiting  


 


Metoprolol Tartrate  50 mg  09/17/22 22:00  09/27/22 09:37





  Metoprolol Tartrate 50 Mg Tab  PO   50 mg





  BID LASHA   Administration


 


Morphine Sulfate  2 mg  09/14/22 02:56  09/14/22 15:13





  Morphine 2 Mg/1 Ml Inj  IV   2 mg





  Q4H PRN   Administration





  Pain, Moderate (4-6)  


 


Morphine Sulfate  4 mg  09/14/22 02:56 





  Morphine 4 Mg/1 Ml Inj  IV  





  Q4H PRN  





  Pain , Severe (7-10)  


 


Ondansetron HCl  4 mg  09/14/22 02:56 





  Ondansetron 4 Mg/2 Ml Inj  IV  





  Q8H PRN  





  Nausea And Vomiting  


 


Sevelamer Carbonate  2,400 mg  09/14/22 07:30  09/27/22 09:36





  Sevelamer Carbonate 800 Mg Tab  PO   2,400 mg





  AC LASHA   Administration


 


Sodium Chloride  10 ml  09/14/22 10:00  09/26/22 23:45





  Sodium Chloride 0.9% 10 Ml Flush Syringe  IV   Not Given





  BID LASHA  


 


Sodium Chloride  10 ml  09/14/22 02:56 





  Sodium Chloride 0.9% 10 Ml Flush Syringe  IV  





  PRN PRN  





  LINE FLUSH  


 


Zolpidem Tartrate  5 mg  09/14/22 22:00  09/26/22 23:44





  Zolpidem 5 Mg Tab  PO   5 mg





  QHS PRN   Administration





  Sleep  














Nutrition/Malnutrition Assess





- Dietary Evaluation


Nutrition/Malnutrition Findings: 


                                        





Nutrition Notes                                            Start:  09/15/22 

10:48


Freq:                                                      Status: Active       




Protocol:                                                                       




 Document     09/22/22 14:25  GLORIA  (Rec: 09/22/22 14:43  GLORIA  [id number])


 Nutrition Notes


     Initial or Follow up                        Assessment


     Current Diagnosis                           CKD (stage V CKD),Diabetes,


                                                 Hypertension


     Other Pertinent Diagnosis                   AMS, r/o Dementia, ESRD+HD,


                                                 Hyperkalemia,


                                                 Hyperphosphatemia, Anemia, ...


     Current Diet                                Renal Diet (since B 09/14).


     Labs/Tests                                  09/22: K 5.5, Cl 91.1, CO2 36,


                                                 BUN 83, Crea 11.7, Glu 59.


     Pertinent Medications                       09/22: Renvela.


     Height                                      5 ft 7 in


     Weight                                      56.1 kg


     Ideal Body Weight (kg)                      67.27


     BMI                                         19.3


     Weight change and time frame                3 Kg body weight gain reported


                                                 in 1 week.


     Weight Status                               Appropriate


     Subjective/Other Information                RD consult for routine F/U on


                                                 dietary advancement.


                                                 Diet continues as prescribed,


                                                 but Dietary Supplements were


                                                 discontinued, No reports


                                                 available on Pt's PO intake of


                                                 meals at the time, will


                                                 assess at F/U.


                                                 Pt is on Room Air, O2


                                                 saturation @ 98%, according to


                                                 Vital Signs notes.


                                                 Pt is awaiting for SNF


                                                 placement, according to


                                                 Progress notes.


     Percent of energy/protein needs met:        Prescribed Renal Diet provides


                                                 for energy/protein needs (2,


                                                 072 Kcal/77 g) during LOS.


     Burn                                        Absent


     Trauma                                      Absent


     GI Symptoms                                 None


     Food Allergy                                No


     Skin Integrity/Comment                      Assessment WNL.


     Minimum of two criteria                     No


     Fluid Accumulation                          N/A


     Reduced  Strength                       N/A (non-severe)


     Protein-Calorie Malnutrition                N\A


     #1


      Nutrition Diagnosis                        Predicted suboptimal energy


                                                 intake


      Comments:                                  Diet continues as prescribed,


                                                 but Dietary Supplements were


                                                 discontinued, No reports


                                                 available on Pt's PO intake of


                                                 meals at the time, will


                                                 assess at F/U.


      Diagnosis Progress(for reassessment        Resolved


       documentation)                            


     Is patient on ventilator?                   No


     Is Patient Ambulatory and/or Out of Bed     Yes


     REE-([place]-St[place] Jeor-ambulatory/OOB) [     1793.519


      [email or web address]]                               


     Calculation Used for Recommendations        [place] Jeor


     Additional Notes                            Protein: >1.2 g/Kg ABW; >64 g/


                                                 day.


                                                 Fluids: 1 ml/Kcal, or as per


                                                 MD.


 Nutrition Intervention


     Change Diet Order:                          Continue Renal Diet as


                                                 tolerated.


     Add Supplement/Snack (indicate name/kcal    Discontinued.


      /protein )                                 


     Goal #1                                     Adjust the dietary


                                                 intervention to better serve


                                                 Pt's energy/protein needs and


                                                 clinical conditions during LOS


                                                 .


     Follow-Up By:                               09/29/22


     Additional Comments                         Continue monitoring food


                                                 tolerance, %PO intake of meals


                                                 , and BM.

## 2022-09-28 RX ADMIN — ASPIRIN SCH MG: 81 TABLET, COATED ORAL at 09:50

## 2022-09-28 RX ADMIN — SEVELAMER CARBONATE SCH MG: 800 TABLET, FILM COATED ORAL at 17:26

## 2022-09-28 RX ADMIN — SEVELAMER CARBONATE SCH MG: 800 TABLET, FILM COATED ORAL at 09:50

## 2022-09-28 RX ADMIN — SEVELAMER CARBONATE SCH MG: 800 TABLET, FILM COATED ORAL at 12:00

## 2022-09-28 RX ADMIN — INSULIN LISPRO SCH UNIT: 100 INJECTION, SOLUTION INTRAVENOUS; SUBCUTANEOUS at 21:53

## 2022-09-28 RX ADMIN — INSULIN LISPRO SCH: 100 INJECTION, SOLUTION INTRAVENOUS; SUBCUTANEOUS at 12:00

## 2022-09-28 RX ADMIN — INSULIN LISPRO SCH: 100 INJECTION, SOLUTION INTRAVENOUS; SUBCUTANEOUS at 17:54

## 2022-09-28 RX ADMIN — Medication SCH: at 22:00

## 2022-09-28 RX ADMIN — METOPROLOL TARTRATE SCH MG: 50 TABLET, FILM COATED ORAL at 21:55

## 2022-09-28 RX ADMIN — INSULIN LISPRO SCH: 100 INJECTION, SOLUTION INTRAVENOUS; SUBCUTANEOUS at 08:00

## 2022-09-28 RX ADMIN — Medication SCH: at 10:54

## 2022-09-28 RX ADMIN — METOPROLOL TARTRATE SCH MG: 50 TABLET, FILM COATED ORAL at 09:50

## 2022-09-28 NOTE — PROGRESS NOTE
Assessment and Plan





Impression


* End-stage renal disease on hemodialysis


* Uremia, resolved with HD


* Hyperkalemia, controlled with HD


* Anemia of ESRD


* Secondary hyperparathyroidism





Plan:


* Patient is refused dialysis yesterday.  He has been advised that he will die 

  without dialysis.


* Continue HD TTS if patient agreeable


* UF as tolerated


* Dose medications for renal function


* Renal/HD diet


* Note discharge planning in progress; patient does not have an outpatient 

  dialysis clinic; HD clinic placement will be challenging due to noncompliance.

  He continues to refuse dialysis, hospice placement is appropriate





Subjective


Date of service: 09/28/22


Principal diagnosis: Encephalopathy


Interval history: 





Patient has no complaints today





Objective





- Vital Signs


Vital signs: 


                               Vital Signs - 12hr











  09/28/22 09/28/22 09/28/22





  04:10 08:21 09:50


 


Temperature 98.8 F 97.9 F 


 


Pulse Rate 90 109 H 109 H


 


Respiratory 16 16 





Rate   


 


Blood Pressure 141/69  


 


Blood Pressure  140/76 





[Right]   


 


O2 Sat by Pulse 98 95 





Oximetry   














  09/28/22





  11:11


 


Temperature 97.9 F


 


Pulse Rate 76


 


Respiratory 16





Rate 


 


Blood Pressure 127/65


 


Blood Pressure 





[Right] 


 


O2 Sat by Pulse 100





Oximetry 














- General Appearance


General appearance: well-developed, well-nourished


EENT: ATNC


Respiratory: Present: Clear to Ascultation


Cardiology: regular, S1S2


Integumentary: warm and dry


Musculoskeletal: other (no edema)





- Lab





                                 09/22/22 05:13





                                 09/22/22 04:00


                             Most recent lab results











Calcium  9.2 mg/dL (8.4-10.2)   09/22/22  04:00    


 


Magnesium  2.30 mg/dL (1.7-2.3)   09/22/22  04:00    














Medications & Allergies





- Medications


Allergies/Adverse Reactions: 


                                    Allergies





No Known Allergies Allergy (Verified 09/15/22 09:46)


   








Home Medications: 


                                Home Medications











 Medication  Instructions  Recorded  Confirmed  Last Taken  Type


 


Aspirin EC [Halfprin EC] 81 mg PO QDAY #30 tablet. 08/25/22 09/15/22 Unknown 

Rx


 


Sevelamer Carbonate [Renvela] 2,400 mg PO AC #60 tablet 08/25/22 09/15/22 

Unknown Rx


 


Zolpidem [Ambien] 5 mg PO QHS PRN #7 tablet 08/25/22 09/15/22 Unknown Rx


 


Metoprolol Tartrate [Lopressor] 100 mg PO BID 09/15/22 09/15/22 Unknown History


 


levETIRAcetam [Keppra TAB] 500 mg PO BID 09/15/22 09/15/22 Unknown History











Active Medications: 














Generic Name Dose Route Start Last Admin





  Trade Name Freq  PRN Reason Stop Dose Admin


 


Acetaminophen  650 mg  09/14/22 02:56 





  Acetaminophen 325 Mg Tab  PO  





  Q4H PRN  





  Pain MILD(1-3)/Fever >100.5/HA  


 


Aspirin  81 mg  09/14/22 10:00  09/28/22 09:50





  Aspirin Ec 81 Mg Tab  PO   81 mg





  QDAY LASHA   Administration


 


Dextrose  50 ml  09/14/22 02:56  09/14/22 11:34





  Dextrose 50% In Water (25gm) 50 Ml Syringe  IV   50 ml





  Q30MIN PRN   Administration





  Hypoglycemia  





  Protocol  


 


Sodium Chloride  100 mls @ 999 mls/hr  09/22/22 09:00 





  Nacl 0.9%  IV  





  EUSEBIA PRN  





  Hypotension  


 


Insulin Human Lispro  0 unit  09/14/22 07:30  09/26/22 23:45





  Insulin Lispro 100 Unit/Ml  SUB-Q   Not Given





  ACHS LASHA  





  Protocol  


 


Levetiracetam  500 mg  09/14/22 10:00  09/28/22 09:50





  Levetiracetam 500 Mg Tab  PO   500 mg





  BID LASHA   Administration


 


Metoclopramide HCl  5 mg  09/28/22 08:00 





  Metoclopramide 10 Mg/2 Ml Inj  IV  





  Q6H PRN  





  Nausea And Vomiting  


 


Metoprolol Tartrate  50 mg  09/17/22 22:00  09/28/22 09:50





  Metoprolol Tartrate 50 Mg Tab  PO   50 mg





  BID LASHA   Administration


 


Morphine Sulfate  2 mg  09/14/22 02:56  09/14/22 15:13





  Morphine 2 Mg/1 Ml Inj  IV   2 mg





  Q4H PRN   Administration





  Pain, Moderate (4-6)  


 


Morphine Sulfate  4 mg  09/14/22 02:56 





  Morphine 4 Mg/1 Ml Inj  IV  





  Q4H PRN  





  Pain , Severe (7-10)  


 


Ondansetron HCl  4 mg  09/14/22 02:56 





  Ondansetron 4 Mg/2 Ml Inj  IV  





  Q8H PRN  





  Nausea And Vomiting  


 


Sevelamer Carbonate  2,400 mg  09/14/22 07:30  09/28/22 09:50





  Sevelamer Carbonate 800 Mg Tab  PO   2,400 mg





  AC LASHA   Administration


 


Sodium Chloride  10 ml  09/14/22 10:00  09/26/22 23:45





  Sodium Chloride 0.9% 10 Ml Flush Syringe  IV   Not Given





  BID LASHA  


 


Sodium Chloride  10 ml  09/14/22 02:56 





  Sodium Chloride 0.9% 10 Ml Flush Syringe  IV  





  PRN PRN  





  LINE FLUSH  


 


Zolpidem Tartrate  5 mg  09/14/22 22:00  09/26/22 23:44





  Zolpidem 5 Mg Tab  PO   5 mg





  QHS PRN   Administration





  Sleep

## 2022-09-28 NOTE — PROGRESS NOTE
Assessment and Plan


Assessment and plan: 





End-stage renal disease on dialysis


Nephrology following, hemodialysis per schedule


Avoid nephrotoxin, renal dosing of medications





Noncompliance  with dialysis


Counseling done strongly advised to comply with medications diet


Hemodialysis and follow-up doctors visits


Patient continues to refuse dialysis


Nephrology aware





Hyperkalemia;


Improved, closely monitor electrolytes





Hypertension; moderate control


Continue current antihypertensives and as needed medications





Possible underlying dementia


Supportive care





Patient DNR and hospice





Hospital course:





9/21/22;


Pending authorization for SNF placement


DC planning per case management





9/22 pending placement SNF with hospice


Case management team processing


Pending authorization





9/23; pending SNF placement





9/24; patient is refusing dialysis


Awaiting SNF/hospice placement


9/25, refused dialysis awaiting placement





9/26; patient DNR awaiting SNF/hospice placement 





9/27: Discharge planning; per case management


Possible discharge to SNF with hospice


Pending authorization





9/28:


Discharge planning; per case management


Possible discharge to SNF with hospice


Pending authorization











History


Interval history: 





No new issues overnight





Hospitalist Physical





- Constitutional


Vitals: 


                                        











Temp Pulse Resp BP Pulse Ox


 


 97.9 F   76   16   127/65   100 


 


 09/28/22 11:11  09/28/22 11:11  09/28/22 11:11  09/28/22 11:11  09/28/22 11:11











General appearance: Present: no acute distress, cachectic, disheveled, other (

malnourished)





- EENT


Eyes: Present: PERRL, EOM intact


ENT: hearing intact, clear oral mucosa, dentition normal





- Neck


Neck: Present: supple, normal ROM





- Respiratory


Respiratory effort: normal


Respiratory: bilateral: CTA





- Cardiovascular


Rhythm: regular


Heart Sounds: Present: S1 & S2.  Absent: gallop, rub





- Extremities


Extremities: no ischemia, No edema, Full ROM





- Abdominal


General gastrointestinal: soft, non-tender, non-distended, normal bowel sounds





- Integumentary


Integumentary: Present: clear, warm, dry





- Neurologic


Neurologic: CNII-XII intact, moves all extremities





Results





- Labs


CBC & Chem 7: 


                                 09/22/22 05:13





                                 09/22/22 04:00


Labs: 


                             Laboratory Last Values











WBC  7.8 K/mm3 (4.5-11.0)   09/17/22  20:59    


 


RBC  2.50 M/mm3 (3.65-5.03)  L  09/17/22  20:59    


 


Hgb  7.4 gm/dl (11.8-15.2)  L  09/22/22  05:13    


 


Hct  22.4 % (35.5-45.6)  L  09/22/22  05:13    


 


MCV  93 fl (84-94)   09/17/22  20:59    


 


MCH  32 pg (28-32)   09/17/22  20:59    


 


MCHC  34 % (32-34)   09/17/22  20:59    


 


RDW  15.6 % (13.2-15.2)  H  09/17/22  20:59    


 


Plt Count  182 K/mm3 (140-440)   09/17/22  20:59    


 


Lymph % (Auto)  18.0 % (13.4-35.0)   09/17/22  20:59    


 


Mono % (Auto)  10.7 % (0.0-7.3)  H  09/17/22  20:59    


 


Eos % (Auto)  1.6 % (0.0-4.3)   09/17/22  20:59    


 


Baso % (Auto)  0.7 % (0.0-1.8)   09/17/22  20:59    


 


Lymph # (Auto)  1.4 K/mm3 (1.2-5.4)   09/17/22  20:59    


 


Mono # (Auto)  0.8 K/mm3 (0.0-0.8)   09/17/22  20:59    


 


Eos # (Auto)  0.1 K/mm3 (0.0-0.4)   09/17/22  20:59    


 


Baso # (Auto)  0.1 K/mm3 (0.0-0.1)   09/17/22  20:59    


 


Seg Neutrophils %  69.0 % (40.0-70.0)   09/17/22  20:59    


 


Seg Neutrophils #  5.4 K/mm3 (1.8-7.7)   09/17/22  20:59    


 


Sodium  142 mmol/L (137-145)   09/22/22  04:00    


 


Potassium  5.5 mmol/L (3.6-5.0)  H  09/22/22  04:00    


 


Chloride  91.1 mmol/L ()  L  09/22/22  04:00    


 


Carbon Dioxide  36 mmol/L (22-30)  H  09/22/22  04:00    


 


Anion Gap  20 mmol/L  09/22/22  04:00    


 


BUN  83 mg/dL (9-20)  H  09/22/22  04:00    


 


Creatinine  11.7 mg/dL (0.8-1.3)  H  09/22/22  04:00    


 


Estimated GFR  6 ml/min  09/22/22  04:00    


 


BUN/Creatinine Ratio  7 %  09/22/22  04:00    


 


Glucose  59 mg/dL ()  L  09/22/22  04:00    


 


POC Glucose  144 mg/dL ()  H  09/27/22  21:34    


 


Calcium  9.2 mg/dL (8.4-10.2)   09/22/22  04:00    


 


Magnesium  2.30 mg/dL (1.7-2.3)   09/22/22  04:00    


 


Total Bilirubin  0.30 mg/dL (0.1-1.2)   09/13/22  22:48    


 


AST  12 units/L (5-40)   09/13/22  22:48    


 


ALT  26 units/L (7-56)   09/13/22  22:48    


 


Alkaline Phosphatase  122 units/L ()   09/13/22  22:48    


 


Total Protein  6.9 g/dL (6.3-8.2)   09/13/22  22:48    


 


Albumin  3.8 g/dL (3.9-5)  L  09/13/22  22:48    


 


Albumin/Globulin Ratio  1.2 %  09/13/22  22:48    


 


Coronavirus (PCR)  Negative  (Negative)   09/16/22  10:00    


 


SARS-CoV-2 (PCR)  Negative  (Negative)   09/20/22  11:34    


 


Hepatitis A IgM Ab  Non-reactive  (NonReactive)   09/14/22  08:31    


 


Hep Bs Antigen  Non-reactive  (Negative)   09/14/22  08:31    


 


Hep B Core IgM Ab  Non-reactive  (NonReactive)   09/14/22  08:31    


 


Hepatitis C Antibody  Non-reactive  (NonReactive)   09/14/22  08:31    











Petty/IV: 


                                        





Voiding Method                   Toilet











Active Medications





- Current Medications


Current Medications: 














Generic Name Dose Route Start Last Admin





  Trade Name Freq  PRN Reason Stop Dose Admin


 


Acetaminophen  650 mg  09/14/22 02:56 





  Acetaminophen 325 Mg Tab  PO  





  Q4H PRN  





  Pain MILD(1-3)/Fever >100.5/HA  


 


Aspirin  81 mg  09/14/22 10:00  09/28/22 09:50





  Aspirin Ec 81 Mg Tab  PO   81 mg





  QDAY LASHA   Administration


 


Dextrose  50 ml  09/14/22 02:56  09/14/22 11:34





  Dextrose 50% In Water (25gm) 50 Ml Syringe  IV   50 ml





  Q30MIN PRN   Administration





  Hypoglycemia  





  Protocol  


 


Sodium Chloride  100 mls @ 999 mls/hr  09/22/22 09:00 





  Nacl 0.9%  IV  





  EUSEBIA PRN  





  Hypotension  


 


Insulin Human Lispro  0 unit  09/14/22 07:30  09/26/22 23:45





  Insulin Lispro 100 Unit/Ml  SUB-Q   Not Given





  ACHS LASHA  





  Protocol  


 


Levetiracetam  500 mg  09/14/22 10:00  09/28/22 09:50





  Levetiracetam 500 Mg Tab  PO   500 mg





  BID LASHA   Administration


 


Metoclopramide HCl  5 mg  09/28/22 08:00 





  Metoclopramide 10 Mg/2 Ml Inj  IV  





  Q6H PRN  





  Nausea And Vomiting  


 


Metoprolol Tartrate  50 mg  09/17/22 22:00  09/28/22 09:50





  Metoprolol Tartrate 50 Mg Tab  PO   50 mg





  BID LASHA   Administration


 


Morphine Sulfate  2 mg  09/14/22 02:56  09/14/22 15:13





  Morphine 2 Mg/1 Ml Inj  IV   2 mg





  Q4H PRN   Administration





  Pain, Moderate (4-6)  


 


Morphine Sulfate  4 mg  09/14/22 02:56 





  Morphine 4 Mg/1 Ml Inj  IV  





  Q4H PRN  





  Pain , Severe (7-10)  


 


Ondansetron HCl  4 mg  09/14/22 02:56 





  Ondansetron 4 Mg/2 Ml Inj  IV  





  Q8H PRN  





  Nausea And Vomiting  


 


Sevelamer Carbonate  2,400 mg  09/14/22 07:30  09/28/22 09:50





  Sevelamer Carbonate 800 Mg Tab  PO   2,400 mg





  AC LASHA   Administration


 


Sodium Chloride  10 ml  09/14/22 10:00  09/26/22 23:45





  Sodium Chloride 0.9% 10 Ml Flush Syringe  IV   Not Given





  BID LASHA  


 


Sodium Chloride  10 ml  09/14/22 02:56 





  Sodium Chloride 0.9% 10 Ml Flush Syringe  IV  





  PRN PRN  





  LINE FLUSH  


 


Zolpidem Tartrate  5 mg  09/14/22 22:00  09/26/22 23:44





  Zolpidem 5 Mg Tab  PO   5 mg





  QHS PRN   Administration





  Sleep  














Nutrition/Malnutrition Assess





- Dietary Evaluation


Nutrition/Malnutrition Findings: 


                                        





Nutrition Notes                                            Start:  09/15/22 

10:48


Freq:                                                      Status: Active       




Protocol:                                                                       




 Document     09/22/22 14:25  GLORIA  (Rec: 09/22/22 14:43  GLORIA  [id number])


 Nutrition Notes


     Initial or Follow up                        Assessment


     Current Diagnosis                           CKD (stage V CKD),Diabetes,


                                                 Hypertension


     Other Pertinent Diagnosis                   AMS, r/o Dementia, ESRD+HD,


                                                 Hyperkalemia,


                                                 Hyperphosphatemia, Anemia, ...


     Current Diet                                Renal Diet (since B 09/14).


     Labs/Tests                                  09/22: K 5.5, Cl 91.1, CO2 36,


                                                 BUN 83, Crea 11.7, Glu 59.


     Pertinent Medications                       09/22: Renvela.


     Height                                      5 ft 7 in


     Weight                                      56.1 kg


     Ideal Body Weight (kg)                      67.27


     BMI                                         19.3


     Weight change and time frame                3 Kg body weight gain reported


                                                 in 1 week.


     Weight Status                               Appropriate


     Subjective/Other Information                RD consult for routine F/U on


                                                 dietary advancement.


                                                 Diet continues as prescribed,


                                                 but Dietary Supplements were


                                                 discontinued, No reports


                                                 available on Pt's PO intake of


                                                 meals at the time, will


                                                 assess at F/U.


                                                 Pt is on Room Air, O2


                                                 saturation @ 98%, according to


                                                 Vital Signs notes.


                                                 Pt is awaiting for SNF


                                                 placement, according to


                                                 Progress notes.


     Percent of energy/protein needs met:        Prescribed Renal Diet provides


                                                 for energy/protein needs (2,


                                                 072 Kcal/77 g) during LOS.


     Burn                                        Absent


     Trauma                                      Absent


     GI Symptoms                                 None


     Food Allergy                                No


     Skin Integrity/Comment                      Assessment WNL.


     Minimum of two criteria                     No


     Fluid Accumulation                          N/A


     Reduced  Strength                       N/A (non-severe)


     Protein-Calorie Malnutrition                N\A


     #1


      Nutrition Diagnosis                        Predicted suboptimal energy


                                                 intake


      Comments:                                  Diet continues as prescribed,


                                                 but Dietary Supplements were


                                                 discontinued, No reports


                                                 available on Pt's PO intake of


                                                 meals at the time, will


                                                 assess at F/U.


      Diagnosis Progress(for reassessment        Resolved


       documentation)                            


     Is patient on ventilator?                   No


     Is Patient Ambulatory and/or Out of Bed     Yes


     REE-(Love-St. Jeor-ambulatory/OOB) [     1793.519


      [email or web address]]                               


     Calculation Used for Recommendations        Love-St Jeor


     Additional Notes                            Protein: >1.2 g/Kg ABW; >64 g/


                                                 day.


                                                 Fluids: 1 ml/Kcal, or as per


                                                 MD.


 Nutrition Intervention


     Change Diet Order:                          Continue Renal Diet as


                                                 tolerated.


     Add Supplement/Snack (indicate name/kcal    Discontinued.


      /protein )                                 


     Goal #1                                     Adjust the dietary


                                                 intervention to better serve


                                                 Pt's energy/protein needs and


                                                 clinical conditions during LOS


                                                 .


     Follow-Up By:                               09/29/22


     Additional Comments                         Continue monitoring food


                                                 tolerance, %PO intake of meals


                                                 , and BM.

## 2022-09-29 LAB
BASOPHILS # (AUTO): 0 K/MM3 (ref 0–0.1)
BASOPHILS NFR BLD AUTO: 0.7 % (ref 0–1.8)
BUN SERPL-MCNC: 138 MG/DL (ref 9–20)
BUN/CREAT SERPL: 8 %
CALCIUM SERPL-MCNC: 9.3 MG/DL (ref 8.4–10.2)
EOSINOPHIL # BLD AUTO: 0.2 K/MM3 (ref 0–0.4)
EOSINOPHIL NFR BLD AUTO: 2.6 % (ref 0–4.3)
HCT VFR BLD CALC: 24.4 % (ref 35.5–45.6)
HEMOLYSIS INDEX: 0
HGB BLD-MCNC: 8.1 GM/DL (ref 11.8–15.2)
LYMPHOCYTES # BLD AUTO: 1 K/MM3 (ref 1.2–5.4)
LYMPHOCYTES NFR BLD AUTO: 14.7 % (ref 13.4–35)
MCHC RBC AUTO-ENTMCNC: 33 % (ref 32–34)
MCV RBC AUTO: 93 FL (ref 84–94)
MONOCYTES # (AUTO): 0.5 K/MM3 (ref 0–0.8)
MONOCYTES % (AUTO): 7.8 % (ref 0–7.3)
PLATELET # BLD: 300 K/MM3 (ref 140–440)
RBC # BLD AUTO: 2.61 M/MM3 (ref 3.65–5.03)

## 2022-09-29 RX ADMIN — ASPIRIN SCH MG: 81 TABLET, COATED ORAL at 09:59

## 2022-09-29 RX ADMIN — METOPROLOL TARTRATE SCH MG: 50 TABLET, FILM COATED ORAL at 09:59

## 2022-09-29 RX ADMIN — SEVELAMER CARBONATE SCH: 800 TABLET, FILM COATED ORAL at 18:51

## 2022-09-29 RX ADMIN — Medication SCH: at 21:41

## 2022-09-29 RX ADMIN — METOPROLOL TARTRATE SCH MG: 50 TABLET, FILM COATED ORAL at 21:40

## 2022-09-29 RX ADMIN — Medication SCH: at 08:18

## 2022-09-29 RX ADMIN — INSULIN LISPRO SCH: 100 INJECTION, SOLUTION INTRAVENOUS; SUBCUTANEOUS at 21:33

## 2022-09-29 RX ADMIN — INSULIN LISPRO SCH: 100 INJECTION, SOLUTION INTRAVENOUS; SUBCUTANEOUS at 18:51

## 2022-09-29 RX ADMIN — INSULIN LISPRO SCH: 100 INJECTION, SOLUTION INTRAVENOUS; SUBCUTANEOUS at 08:20

## 2022-09-29 RX ADMIN — Medication SCH: at 08:19

## 2022-09-29 RX ADMIN — SEVELAMER CARBONATE SCH MG: 800 TABLET, FILM COATED ORAL at 09:59

## 2022-09-29 RX ADMIN — INSULIN LISPRO SCH: 100 INJECTION, SOLUTION INTRAVENOUS; SUBCUTANEOUS at 08:19

## 2022-09-29 RX ADMIN — INSULIN LISPRO SCH: 100 INJECTION, SOLUTION INTRAVENOUS; SUBCUTANEOUS at 09:55

## 2022-09-29 RX ADMIN — Medication SCH: at 09:59

## 2022-09-29 RX ADMIN — INSULIN LISPRO SCH: 100 INJECTION, SOLUTION INTRAVENOUS; SUBCUTANEOUS at 08:18

## 2022-09-29 NOTE — PROGRESS NOTE
Assessment and Plan





Impression


* End-stage renal disease on hemodialysis


* Uremia, resolved with HD


* Hyperkalemia, controlled with HD


* Anemia of ESRD


* Secondary hyperparathyroidism





Plan:


* Patient refused dialysis on Tuesday.  Today, patient initially states that he 

  will not be going to dialysis today.  Again, he was advised that he will die 

  without dialysis. 


* Continue HD TTS if patient agreeable


* UF as tolerated


* Dose medications for renal function


* Renal/HD diet


* Note discharge planning in progress; patient does not have an outpatient 

  dialysis clinic; HD clinic placement will be challenging due to noncompliance.

  He continues to refuse dialysis, hospice placement is appropriate





Subjective


Date of service: 09/29/22


Principal diagnosis: Encephalopathy


Interval history: 





Patient has  no complaints





Objective





- Vital Signs


Vital signs: 


                               Vital Signs - 12hr











  09/29/22 09/29/22 09/29/22





  03:31 07:35 11:44


 


Temperature 98.0 F 98.2 F 97.9 F


 


Pulse Rate 90 83 73


 


Respiratory 18 16 18





Rate   


 


Blood Pressure 144/74 132/65 128/75


 


O2 Sat by Pulse 100 97 93





Oximetry   














- General Appearance


General appearance: well-developed, well-nourished


EENT: ATNC


Respiratory: Present: Clear to Ascultation


Cardiology: regular, S1S2


Gastrointestinal: normal, no tenderness, no distended


Integumentary: warm and dry


Neurologic: alert and oriented x3





- Lab





                                 09/29/22 17:07





                                 09/29/22 17:07


                             Most recent lab results











Calcium  9.2 mg/dL (8.4-10.2)   09/22/22  04:00    


 


Magnesium  2.30 mg/dL (1.7-2.3)   09/22/22  04:00    














Medications & Allergies





- Medications


Allergies/Adverse Reactions: 


                                    Allergies





No Known Allergies Allergy (Verified 09/15/22 09:46)


   








Home Medications: 


                                Home Medications











 Medication  Instructions  Recorded  Confirmed  Last Taken  Type


 


Aspirin EC [Halfprin EC] 81 mg PO QDAY #30 tablet. 08/25/22 09/15/22 Unknown 

Rx


 


Sevelamer Carbonate [Renvela] 2,400 mg PO AC #60 tablet 08/25/22 09/15/22 

Unknown Rx


 


Zolpidem [Ambien] 5 mg PO QHS PRN #7 tablet 08/25/22 09/15/22 Unknown Rx


 


Metoprolol Tartrate [Lopressor] 100 mg PO BID 09/15/22 09/15/22 Unknown History


 


levETIRAcetam [Keppra TAB] 500 mg PO BID 09/15/22 09/15/22 Unknown History











Active Medications: 














Generic Name Dose Route Start Last Admin





  Trade Name Freq  PRN Reason Stop Dose Admin


 


Acetaminophen  650 mg  09/14/22 02:56 





  Acetaminophen 325 Mg Tab  PO  





  Q4H PRN  





  Pain MILD(1-3)/Fever >100.5/HA  


 


Aspirin  81 mg  09/14/22 10:00  09/29/22 09:59





  Aspirin Ec 81 Mg Tab  PO   81 mg





  QDAY LASHA   Administration


 


Dextrose  50 ml  09/14/22 02:56  09/14/22 11:34





  Dextrose 50% In Water (25gm) 50 Ml Syringe  IV   50 ml





  Q30MIN PRN   Administration





  Hypoglycemia  





  Protocol  


 


Sodium Chloride  100 mls @ 999 mls/hr  09/22/22 09:00 





  Nacl 0.9%  IV  





  EUSEBIA PRN  





  Hypotension  


 


Insulin Human Lispro  0 unit  09/14/22 07:30  09/29/22 09:55





  Insulin Lispro 100 Unit/Ml  SUB-Q   Not Given





  ACHS LASHA  





  Protocol  


 


Levetiracetam  500 mg  09/14/22 10:00  09/29/22 09:59





  Levetiracetam 500 Mg Tab  PO   500 mg





  BID LASHA   Administration


 


Metoclopramide HCl  5 mg  09/28/22 08:00 





  Metoclopramide 10 Mg/2 Ml Inj  IV  





  Q6H PRN  





  Nausea And Vomiting  


 


Metoprolol Tartrate  50 mg  09/17/22 22:00  09/29/22 09:59





  Metoprolol Tartrate 50 Mg Tab  PO   50 mg





  BID LASHA   Administration


 


Morphine Sulfate  2 mg  09/14/22 02:56  09/14/22 15:13





  Morphine 2 Mg/1 Ml Inj  IV   2 mg





  Q4H PRN   Administration





  Pain, Moderate (4-6)  


 


Morphine Sulfate  4 mg  09/14/22 02:56 





  Morphine 4 Mg/1 Ml Inj  IV  





  Q4H PRN  





  Pain , Severe (7-10)  


 


Ondansetron HCl  4 mg  09/14/22 02:56 





  Ondansetron 4 Mg/2 Ml Inj  IV  





  Q8H PRN  





  Nausea And Vomiting  


 


Sevelamer Carbonate  2,400 mg  09/14/22 07:30  09/29/22 09:59





  Sevelamer Carbonate 800 Mg Tab  PO   2,400 mg





  AC LASHA   Administration


 


Sodium Chloride  10 ml  09/14/22 10:00  09/29/22 09:59





  Sodium Chloride 0.9% 10 Ml Flush Syringe  IV   Not Given





  BID LASHA  


 


Sodium Chloride  10 ml  09/14/22 02:56 





  Sodium Chloride 0.9% 10 Ml Flush Syringe  IV  





  PRN PRN  





  LINE FLUSH  


 


Zolpidem Tartrate  5 mg  09/14/22 22:00  09/26/22 23:44





  Zolpidem 5 Mg Tab  PO   5 mg





  QHS PRN   Administration





  Sleep

## 2022-09-29 NOTE — PROGRESS NOTE
Assessment and Plan


Assessment and plan: 





End-stage renal disease on dialysis


Nephrology following, hemodialysis per schedule


Avoid nephrotoxin, renal dosing of medications





Noncompliance  with dialysis


Counseling done strongly advised to comply with medications diet


Hemodialysis and follow-up doctors visits


Patient continues to refuse dialysis


Nephrology aware





Hyperkalemia;


Improved, closely monitor electrolytes





Hypertension; moderate control


Continue current antihypertensives and as needed medications





Possible underlying dementia


Supportive care





Patient DNR and hospice





Hospital course:





9/21/22;


Pending authorization for SNF placement


DC planning per case management





9/22 pending placement SNF with hospice


Case management team processing


Pending authorization





9/23; pending SNF placement





9/24; patient is refusing dialysis


Awaiting SNF/hospice placement


9/25, refused dialysis awaiting placement





9/26; patient DNR awaiting SNF/hospice placement 





9/27: Discharge planning; per case management


Possible discharge to SNF with hospice


Pending authorization





9/28:


Discharge planning; per case management


Possible discharge to SNF with hospice


Pending authorization





9/29: Case management reports patient does not want to go to a nursing home.  

Patient reportedly to return back to his apartment if the  will 

accept the patient.  Spouse reportedly is in agreement





History


Interval history: 





No new issues overnight





Hospitalist Physical





- Constitutional


Vitals: 


                                        











Temp Pulse Resp BP Pulse Ox


 


 98.0 F   90   18   144/74   100 


 


 09/29/22 03:31  09/29/22 03:31  09/29/22 03:31  09/29/22 03:31  09/29/22 03:31











General appearance: Present: no acute distress, cachectic, disheveled, other 

(malnourished)





- EENT


Eyes: Present: PERRL, EOM intact


ENT: hearing intact, clear oral mucosa, dentition normal





- Neck


Neck: Present: supple, normal ROM





- Respiratory


Respiratory effort: normal


Respiratory: bilateral: CTA





- Cardiovascular


Rhythm: regular


Heart Sounds: Present: S1 & S2.  Absent: gallop, rub





- Extremities


Extremities: no ischemia, No edema, Full ROM





- Abdominal


General gastrointestinal: soft, non-tender, non-distended, normal bowel sounds





- Integumentary


Integumentary: Present: clear, warm, dry





- Neurologic


Neurologic: CNII-XII intact, moves all extremities





Results





- Labs


CBC & Chem 7: 


                                 09/22/22 05:13





                                 09/22/22 04:00


Labs: 


                             Laboratory Last Values











WBC  7.8 K/mm3 (4.5-11.0)   09/17/22  20:59    


 


RBC  2.50 M/mm3 (3.65-5.03)  L  09/17/22  20:59    


 


Hgb  7.4 gm/dl (11.8-15.2)  L  09/22/22  05:13    


 


Hct  22.4 % (35.5-45.6)  L  09/22/22  05:13    


 


MCV  93 fl (84-94)   09/17/22  20:59    


 


MCH  32 pg (28-32)   09/17/22  20:59    


 


MCHC  34 % (32-34)   09/17/22  20:59    


 


RDW  15.6 % (13.2-15.2)  H  09/17/22  20:59    


 


Plt Count  182 K/mm3 (140-440)   09/17/22  20:59    


 


Lymph % (Auto)  18.0 % (13.4-35.0)   09/17/22  20:59    


 


Mono % (Auto)  10.7 % (0.0-7.3)  H  09/17/22  20:59    


 


Eos % (Auto)  1.6 % (0.0-4.3)   09/17/22  20:59    


 


Baso % (Auto)  0.7 % (0.0-1.8)   09/17/22  20:59    


 


Lymph # (Auto)  1.4 K/mm3 (1.2-5.4)   09/17/22  20:59    


 


Mono # (Auto)  0.8 K/mm3 (0.0-0.8)   09/17/22  20:59    


 


Eos # (Auto)  0.1 K/mm3 (0.0-0.4)   09/17/22  20:59    


 


Baso # (Auto)  0.1 K/mm3 (0.0-0.1)   09/17/22  20:59    


 


Seg Neutrophils %  69.0 % (40.0-70.0)   09/17/22  20:59    


 


Seg Neutrophils #  5.4 K/mm3 (1.8-7.7)   09/17/22  20:59    


 


Sodium  142 mmol/L (137-145)   09/22/22  04:00    


 


Potassium  5.5 mmol/L (3.6-5.0)  H  09/22/22  04:00    


 


Chloride  91.1 mmol/L ()  L  09/22/22  04:00    


 


Carbon Dioxide  36 mmol/L (22-30)  H  09/22/22  04:00    


 


Anion Gap  20 mmol/L  09/22/22  04:00    


 


BUN  83 mg/dL (9-20)  H  09/22/22  04:00    


 


Creatinine  11.7 mg/dL (0.8-1.3)  H  09/22/22  04:00    


 


Estimated GFR  6 ml/min  09/22/22  04:00    


 


BUN/Creatinine Ratio  7 %  09/22/22  04:00    


 


Glucose  59 mg/dL ()  L  09/22/22  04:00    


 


POC Glucose  174 mg/dL ()  H  09/28/22  20:35    


 


Calcium  9.2 mg/dL (8.4-10.2)   09/22/22  04:00    


 


Magnesium  2.30 mg/dL (1.7-2.3)   09/22/22  04:00    


 


Total Bilirubin  0.30 mg/dL (0.1-1.2)   09/13/22  22:48    


 


AST  12 units/L (5-40)   09/13/22  22:48    


 


ALT  26 units/L (7-56)   09/13/22  22:48    


 


Alkaline Phosphatase  122 units/L ()   09/13/22  22:48    


 


Total Protein  6.9 g/dL (6.3-8.2)   09/13/22  22:48    


 


Albumin  3.8 g/dL (3.9-5)  L  09/13/22  22:48    


 


Albumin/Globulin Ratio  1.2 %  09/13/22  22:48    


 


Coronavirus (PCR)  Negative  (Negative)   09/16/22  10:00    


 


SARS-CoV-2 (PCR)  Negative  (Negative)   09/20/22  11:34    


 


Hepatitis A IgM Ab  Non-reactive  (NonReactive)   09/14/22  08:31    


 


Hep Bs Antigen  Non-reactive  (Negative)   09/14/22  08:31    


 


Hep B Core IgM Ab  Non-reactive  (NonReactive)   09/14/22  08:31    


 


Hepatitis C Antibody  Non-reactive  (NonReactive)   09/14/22  08:31    











Petty/IV: 


                                        





Voiding Method                   Toilet











Active Medications





- Current Medications


Current Medications: 














Generic Name Dose Route Start Last Admin





  Trade Name Killian  PRN Reason Stop Dose Admin


 


Acetaminophen  650 mg  09/14/22 02:56 





  Acetaminophen 325 Mg Tab  PO  





  Q4H PRN  





  Pain MILD(1-3)/Fever >100.5/HA  


 


Aspirin  81 mg  09/14/22 10:00  09/28/22 09:50





  Aspirin Ec 81 Mg Tab  PO   81 mg





  QDAY LASHA   Administration


 


Dextrose  50 ml  09/14/22 02:56  09/14/22 11:34





  Dextrose 50% In Water (25gm) 50 Ml Syringe  IV   50 ml





  Q30MIN PRN   Administration





  Hypoglycemia  





  Protocol  


 


Sodium Chloride  100 mls @ 999 mls/hr  09/22/22 09:00 





  Nacl 0.9%  IV  





  EUSEBIA PRN  





  Hypotension  


 


Insulin Human Lispro  0 unit  09/14/22 07:30  09/29/22 08:20





  Insulin Lispro 100 Unit/Ml  SUB-Q   Not Given





  ACHS LASHA  





  Protocol  


 


Levetiracetam  500 mg  09/14/22 10:00  09/28/22 21:54





  Levetiracetam 500 Mg Tab  PO   500 mg





  BID LASHA   Administration


 


Metoclopramide HCl  5 mg  09/28/22 08:00 





  Metoclopramide 10 Mg/2 Ml Inj  IV  





  Q6H PRN  





  Nausea And Vomiting  


 


Metoprolol Tartrate  50 mg  09/17/22 22:00  09/28/22 21:55





  Metoprolol Tartrate 50 Mg Tab  PO   50 mg





  BID LASHA   Administration


 


Morphine Sulfate  2 mg  09/14/22 02:56  09/14/22 15:13





  Morphine 2 Mg/1 Ml Inj  IV   2 mg





  Q4H PRN   Administration





  Pain, Moderate (4-6)  


 


Morphine Sulfate  4 mg  09/14/22 02:56 





  Morphine 4 Mg/1 Ml Inj  IV  





  Q4H PRN  





  Pain , Severe (7-10)  


 


Ondansetron HCl  4 mg  09/14/22 02:56 





  Ondansetron 4 Mg/2 Ml Inj  IV  





  Q8H PRN  





  Nausea And Vomiting  


 


Sevelamer Carbonate  2,400 mg  09/14/22 07:30  09/28/22 17:26





  Sevelamer Carbonate 800 Mg Tab  PO   2,400 mg





  AC LASHA   Administration


 


Sodium Chloride  10 ml  09/14/22 10:00  09/29/22 08:19





  Sodium Chloride 0.9% 10 Ml Flush Syringe  IV   Not Given





  BID LASHA  


 


Sodium Chloride  10 ml  09/14/22 02:56 





  Sodium Chloride 0.9% 10 Ml Flush Syringe  IV  





  PRN PRN  





  LINE FLUSH  


 


Zolpidem Tartrate  5 mg  09/14/22 22:00  09/26/22 23:44





  Zolpidem 5 Mg Tab  PO   5 mg





  QHS PRN   Administration





  Sleep  














Nutrition/Malnutrition Assess





- Dietary Evaluation


Nutrition/Malnutrition Findings: 


                                        





Nutrition Notes                                            Start:  09/15/22 

10:48


Freq:                                                      Status: Active       




Protocol:                                                                       




 Document     09/22/22 14:25  GLORIA  (Rec: 09/22/22 14:43  GLORIA  [id number])


 Nutrition Notes


     Initial or Follow up                        Assessment


     Current Diagnosis                           CKD (stage V CKD),Diabetes,


                                                 Hypertension


     Other Pertinent Diagnosis                   AMS, r/o Dementia, ESRD+HD,


                                                 Hyperkalemia,


                                                 Hyperphosphatemia, Anemia, ...


     Current Diet                                Renal Diet (since B 09/14).


     Labs/Tests                                  09/22: K 5.5, Cl 91.1, CO2 36,


                                                 BUN 83, Crea 11.7, Glu 59.


     Pertinent Medications                       09/22: Renvela.


     Height                                      5 ft 7 in


     Weight                                      56.1 kg


     Ideal Body Weight (kg)                      67.27


     BMI                                         19.3


     Weight change and time frame                3 Kg body weight gain reported


                                                 in 1 week.


     Weight Status                               Appropriate


     Subjective/Other Information                RD consult for routine F/U on


                                                 dietary advancement.


                                                 Diet continues as prescribed,


                                                 but Dietary Supplements were


                                                 discontinued, No reports


                                                 available on Pt's PO intake of


                                                 meals at the time, will


                                                 assess at F/U.


                                                 Pt is on Room Air, O2


                                                 saturation @ 98%, according to


                                                 Vital Signs notes.


                                                 Pt is awaiting for SNF


                                                 placement, according to


                                                 Progress notes.


     Percent of energy/protein needs met:        Prescribed Renal Diet provides


                                                 for energy/protein needs (2,


                                                 072 Kcal/77 g) during LOS.


     Burn                                        Absent


     Trauma                                      Absent


     GI Symptoms                                 None


     Food Allergy                                No


     Skin Integrity/Comment                      Assessment WNL.


     Minimum of two criteria                     No


     Fluid Accumulation                          N/A


     Reduced  Strength                       N/A (non-severe)


     Protein-Calorie Malnutrition                N\A


     #1


      Nutrition Diagnosis                        Predicted suboptimal energy


                                                 intake


      Comments:                                  Diet continues as prescribed,


                                                 but Dietary Supplements were


                                                 discontinued, No reports


                                                 available on Pt's PO intake of


                                                 meals at the time, will


                                                 assess at F/U.


      Diagnosis Progress(for reassessment        Resolved


       documentation)                            


     Is patient on ventilator?                   No


     Is Patient Ambulatory and/or Out of Bed     Yes


     [facility] [     [phone]


      [email or web address]]                               


     Calculation Used for Recommendations        Dorina Tiffanie


     Additional Notes                            Protein: >1.2 g/Kg ABW; >64 g/


                                                 day.


                                                 Fluids: 1 ml/Kcal, or as per


                                                 MD.


 Nutrition Intervention


     Change Diet Order:                          Continue Renal Diet as


                                                 tolerated.


     Add Supplement/Snack (indicate name/kcal    Discontinued.


      /protein )                                 


     Goal #1                                     Adjust the dietary


                                                 intervention to better serve


                                                 Pt's energy/protein needs and


                                                 clinical conditions during LOS


                                                 .


     Follow-Up By:                               09/29/22


     Additional Comments                         Continue monitoring food


                                                 tolerance, %PO intake of meals


                                                 , and BM.

## 2022-09-30 VITALS — DIASTOLIC BLOOD PRESSURE: 75 MMHG | SYSTOLIC BLOOD PRESSURE: 136 MMHG

## 2022-09-30 LAB
BUN SERPL-MCNC: 138 MG/DL (ref 9–20)
BUN/CREAT SERPL: 8 %
CALCIUM SERPL-MCNC: 9 MG/DL (ref 8.4–10.2)
HCT VFR BLD CALC: 23 % (ref 35.5–45.6)
HEMOLYSIS INDEX: 26
HGB BLD-MCNC: 7.5 GM/DL (ref 11.8–15.2)
MCHC RBC AUTO-ENTMCNC: 33 % (ref 32–34)
MCV RBC AUTO: 94 FL (ref 84–94)
PLATELET # BLD: 244 K/MM3 (ref 140–440)
RBC # BLD AUTO: 2.46 M/MM3 (ref 3.65–5.03)

## 2022-09-30 RX ADMIN — METOPROLOL TARTRATE SCH MG: 50 TABLET, FILM COATED ORAL at 10:08

## 2022-09-30 RX ADMIN — SODIUM POLYSTYRENE SULFONATE SCH GM: 15 SUSPENSION ORAL; RECTAL at 10:08

## 2022-09-30 RX ADMIN — Medication SCH ML: at 10:08

## 2022-09-30 RX ADMIN — INSULIN LISPRO SCH: 100 INJECTION, SOLUTION INTRAVENOUS; SUBCUTANEOUS at 14:44

## 2022-09-30 RX ADMIN — SEVELAMER CARBONATE SCH MG: 800 TABLET, FILM COATED ORAL at 10:08

## 2022-09-30 RX ADMIN — SODIUM POLYSTYRENE SULFONATE SCH: 15 SUSPENSION ORAL; RECTAL at 10:18

## 2022-09-30 RX ADMIN — ASPIRIN SCH MG: 81 TABLET, COATED ORAL at 10:08

## 2022-09-30 RX ADMIN — SEVELAMER CARBONATE SCH: 800 TABLET, FILM COATED ORAL at 14:44

## 2022-09-30 RX ADMIN — INSULIN LISPRO SCH: 100 INJECTION, SOLUTION INTRAVENOUS; SUBCUTANEOUS at 08:57

## 2022-09-30 NOTE — PROGRESS NOTE
Subjective


Date of service: 09/30/22


Principal diagnosis: Encephalopathy


Interval history: 








Impression


* End-stage renal disease on hemodialysis


* Uremia


* Hyperkalemia, 


* Anemia of ESRD


* Secondary hyperparathyroidism





Plan:


* Patient continues to refuse dialysis  Again, he was advised that he will die 

  without dialysis. 


* Dose medications for renal function


* Renal/HD diet


* rec daily kayexalate


* He continues to refuse dialysis, hospice placement is appropriate, no further 

  intervention from renal standpoint can be provided


* rec palliative care and hospice, call renal if patient interested in dialysis,

  otherwise will sign off





Subjective


Principal diagnosis: Encephalopathy


Interval history: 





labs, notes and chart reviewed today


Objective





General appearance: well-developed, well-nourished


EENT: ATNC


Respiratory: Present: Clear to Ascultation


Cardiology: regular, S1S2


Gastrointestinal: normal, no tenderness, no distended


Integumentary: warm and dry


Neurologic: alert and oriented x3





Objective





- Vital Signs


Vital signs: 


                               Vital Signs - 12hr











  09/30/22 09/30/22





  03:38 07:32


 


Temperature 98.0 F 98.0 F


 


Pulse Rate 86 90


 


Respiratory 18 19





Rate  


 


Blood Pressure 136/71 137/77


 


O2 Sat by Pulse 99 100





Oximetry  














- Lab





                                 09/30/22 05:16





                                 09/30/22 05:16


                             Most recent lab results











Calcium  9.0 mg/dL (8.4-10.2)   09/30/22  05:16    


 


Magnesium  2.30 mg/dL (1.7-2.3)   09/22/22  04:00    














Medications & Allergies





- Medications


Allergies/Adverse Reactions: 


                                    Allergies





No Known Allergies Allergy (Verified 09/15/22 09:46)


   








Home Medications: 


                                Home Medications











 Medication  Instructions  Recorded  Confirmed  Last Taken  Type


 


Aspirin EC [Halfprin EC] 81 mg PO QDAY #30 tablet. 08/25/22 09/15/22 Unknown 

Rx


 


Sevelamer Carbonate [Renvela] 2,400 mg PO AC #60 tablet 08/25/22 09/15/22 

Unknown Rx


 


Zolpidem [Ambien] 5 mg PO QHS PRN #7 tablet 08/25/22 09/15/22 Unknown Rx


 


Metoprolol Tartrate [Lopressor] 100 mg PO BID 09/15/22 09/15/22 Unknown History


 


levETIRAcetam [Keppra TAB] 500 mg PO BID 09/15/22 09/15/22 Unknown History











Active Medications: 














Generic Name Dose Route Start Last Admin





  Trade Name Kunq  PRN Reason Stop Dose Admin


 


Acetaminophen  650 mg  09/14/22 02:56 





  Acetaminophen 325 Mg Tab  PO  





  Q4H PRN  





  Pain MILD(1-3)/Fever >100.5/HA  


 


Aspirin  81 mg  09/14/22 10:00  09/30/22 10:08





  Aspirin Ec 81 Mg Tab  PO   81 mg





  QDAY LASHA   Administration


 


Dextrose  50 ml  09/14/22 02:56  09/14/22 11:34





  Dextrose 50% In Water (25gm) 50 Ml Syringe  IV   50 ml





  Q30MIN PRN   Administration





  Hypoglycemia  





  Protocol  


 


Sodium Chloride  100 mls @ 999 mls/hr  09/22/22 09:00 





  Nacl 0.9%  IV  





  EUSEBIA PRN  





  Hypotension  


 


Insulin Human Lispro  0 unit  09/14/22 07:30  09/30/22 08:57





  Insulin Lispro 100 Unit/Ml  SUB-Q   Not Given





  ACHS LASHA  





  Protocol  


 


Levetiracetam  500 mg  09/14/22 10:00  09/30/22 10:08





  Levetiracetam 500 Mg Tab  PO   500 mg





  BID LASHA   Administration


 


Metoclopramide HCl  5 mg  09/28/22 08:00 





  Metoclopramide 10 Mg/2 Ml Inj  IV  





  Q6H PRN  





  Nausea And Vomiting  


 


Metoprolol Tartrate  50 mg  09/17/22 22:00  09/30/22 10:08





  Metoprolol Tartrate 50 Mg Tab  PO   50 mg





  BID LASHA   Administration


 


Morphine Sulfate  2 mg  09/14/22 02:56  09/14/22 15:13





  Morphine 2 Mg/1 Ml Inj  IV   2 mg





  Q4H PRN   Administration





  Pain, Moderate (4-6)  


 


Morphine Sulfate  4 mg  09/14/22 02:56 





  Morphine 4 Mg/1 Ml Inj  IV  





  Q4H PRN  





  Pain , Severe (7-10)  


 


Ondansetron HCl  4 mg  09/14/22 02:56 





  Ondansetron 4 Mg/2 Ml Inj  IV  





  Q8H PRN  





  Nausea And Vomiting  


 


Sevelamer Carbonate  2,400 mg  09/14/22 07:30  09/30/22 10:08





  Sevelamer Carbonate 800 Mg Tab  PO   2,400 mg





  AC LASHA   Administration


 


Sodium Chloride  10 ml  09/14/22 10:00  09/30/22 10:08





  Sodium Chloride 0.9% 10 Ml Flush Syringe  IV   10 ml





  BID LASHA   Administration


 


Sodium Chloride  10 ml  09/14/22 02:56 





  Sodium Chloride 0.9% 10 Ml Flush Syringe  IV  





  PRN PRN  





  LINE FLUSH  


 


Sodium Polystyrene Sulfonate  45 gm  09/30/22 09:00  09/30/22 10:18





  Sodium Polystyrene 15 Gm/60 Ml Oral Liqd  PO  09/30/22 12:00  Not Given





  ONCE@0900 LASHA  


 


Zolpidem Tartrate  5 mg  09/14/22 22:00  09/26/22 23:44





  Zolpidem 5 Mg Tab  PO   5 mg





  QHS PRN   Administration





  Sleep

## 2022-09-30 NOTE — PROGRESS NOTE
Assessment and Plan


Assessment and plan: 


This is a 51-year-old man with end-stage renal disease on hemodialysis with 

history of noncompliance who presented to the emergency department with 

generalized weakness and nausea.





End-stage renal disease on dialysis


Nephrology following, hemodialysis per schedule


Avoid nephrotoxin, renal dosing of medications





Noncompliance  with dialysis


Counseling done strongly advised to comply with medications diet


Hemodialysis and follow-up doctors visits


Patient continues to refuse dialysis


Nephrology aware





Hyperkalemia;


Improved, closely monitor electrolytes





Hypertension; moderate control


Continue current antihypertensives and as needed medications





Possible underlying dementia


Supportive care





Patient DNR and hospice





Hospital course:





9/21/22;


Pending authorization for SNF placement


DC planning per case management





9/22 pending placement SNF with hospice


Case management team processing


Pending authorization





9/23; pending SNF placement





9/24; patient is refusing dialysis


Awaiting SNF/hospice placement


9/25, refused dialysis awaiting placement





9/26; patient DNR awaiting SNF/hospice placement 





9/27: Discharge planning; per case management


Possible discharge to SNF with hospice


Pending authorization





9/28:


Discharge planning; per case management


Possible discharge to SNF with hospice


Pending authorization





9/29: Case management reports patient does not want to go to a nursing home.  

Patient reportedly to return back to his apartment if the  will 

accept the patient.  Spouse reportedly is in agreement





9/30: Patient with potassium of 7.7 this morning.  Patient is refusing 

hemodialysis as well as hyperkalemic treatments insulin/glucose, Kayexalate and 

calcium gluconate.  I explained to the patient that if he does not undergo 

hemodialysis and other treatments as described that he will have sudden cardiac 

arrest and die.  Patient stated that he wanted to "sleep on it".  Nursing and 

myself placed emphasis again on the importance of treatment, patient voiced 

understanding and stated that it is "in God's hands".  Nephrology also saw the 

patient today and reports he continues to refuse dialysis.  I discussed case 

with Dr. Ceron who had a discussion with the patient and wife and now 

arrangements are made for hospice.  Nephrology reports since the patient 

continues to refuse dialysis, hospice placement is appropriate.  Case management

arranged for hospice.





History


Interval history: 





No new issues overnight





Hospitalist Physical





- Constitutional


Vitals: 


                                        











Temp Pulse Resp BP Pulse Ox


 


 98.0 F   90   19   137/77   100 


 


 09/30/22 07:32  09/30/22 07:32  09/30/22 07:32  09/30/22 07:32  09/30/22 07:32











General appearance: Present: no acute distress, cachectic, disheveled, other 

(malnourished)





- EENT


Eyes: Present: PERRL, EOM intact


ENT: hearing intact, clear oral mucosa, dentition normal





- Neck


Neck: Present: supple, normal ROM





- Respiratory


Respiratory effort: normal


Respiratory: bilateral: CTA





- Cardiovascular


Rhythm: regular


Heart Sounds: Present: S1 & S2.  Absent: gallop, rub





- Extremities


Extremities: no ischemia, No edema, Full ROM





- Abdominal


General gastrointestinal: soft, non-tender, non-distended, normal bowel sounds





- Integumentary


Integumentary: Present: clear, warm, dry





- Neurologic


Neurologic: CNII-XII intact, moves all extremities





Results





- Labs


CBC & Chem 7: 


                                 09/30/22 05:16





                                 09/30/22 05:16


Labs: 


                             Laboratory Last Values











WBC  8.7 K/mm3 (4.5-11.0)   09/30/22  05:16    


 


RBC  2.46 M/mm3 (3.65-5.03)  L  09/30/22  05:16    


 


Hgb  7.5 gm/dl (11.8-15.2)  L  09/30/22  05:16    


 


Hct  23.0 % (35.5-45.6)  L  09/30/22  05:16    


 


MCV  94 fl (84-94)   09/30/22  05:16    


 


MCH  31 pg (28-32)   09/30/22  05:16    


 


MCHC  33 % (32-34)   09/30/22  05:16    


 


RDW  14.9 % (13.2-15.2)   09/30/22  05:16    


 


Plt Count  244 K/mm3 (140-440)   09/30/22  05:16    


 


Lymph % (Auto)  Np   09/30/22  05:16    


 


Mono % (Auto)  Np   09/30/22  05:16    


 


Eos % (Auto)  Np   09/30/22  05:16    


 


Baso % (Auto)  Np   09/30/22  05:16    


 


Lymph # (Auto)  Np   09/30/22  05:16    


 


Mono # (Auto)  Np   09/30/22  05:16    


 


Eos # (Auto)  Np   09/30/22  05:16    


 


Baso # (Auto)  Np   09/30/22  05:16    


 


Seg Neutrophils %  Np   09/30/22  05:16    


 


Seg Neutrophils #  Np   09/30/22  05:16    


 


Sodium  140 mmol/L (137-145)   09/30/22  05:16    


 


Potassium  7.7 mmol/L (3.6-5.0)  H*  09/30/22  05:16    


 


Chloride  90.7 mmol/L ()  L  09/30/22  05:16    


 


Carbon Dioxide  26 mmol/L (22-30)   09/30/22  05:16    


 


Anion Gap  31 mmol/L  09/30/22  05:16    


 


BUN  138 mg/dL (9-20)  H  09/30/22  05:16    


 


Creatinine  18.2 mg/dL (0.8-1.3)  H  09/30/22  05:16    


 


Estimated GFR  3 ml/min  09/30/22  05:16    


 


BUN/Creatinine Ratio  8 %  09/30/22  05:16    


 


Glucose  82 mg/dL ()   09/30/22  05:16    


 


POC Glucose  86 mg/dL ()   09/30/22  07:31    


 


Calcium  9.0 mg/dL (8.4-10.2)   09/30/22  05:16    


 


Magnesium  2.30 mg/dL (1.7-2.3)   09/22/22  04:00    


 


Total Bilirubin  0.30 mg/dL (0.1-1.2)   09/13/22  22:48    


 


AST  12 units/L (5-40)   09/13/22  22:48    


 


ALT  26 units/L (7-56)   09/13/22  22:48    


 


Alkaline Phosphatase  122 units/L ()   09/13/22  22:48    


 


Total Protein  6.9 g/dL (6.3-8.2)   09/13/22  22:48    


 


Albumin  3.8 g/dL (3.9-5)  L  09/13/22  22:48    


 


Albumin/Globulin Ratio  1.2 %  09/13/22  22:48    


 


Coronavirus (PCR)  Negative  (Negative)   09/16/22  10:00    


 


SARS-CoV-2 (PCR)  Negative  (Negative)   09/20/22  11:34    


 


Hepatitis A IgM Ab  Non-reactive  (NonReactive)   09/14/22  08:31    


 


Hep Bs Antigen  Non-reactive  (Negative)   09/14/22  08:31    


 


Hep B Core IgM Ab  Non-reactive  (NonReactive)   09/14/22  08:31    


 


Hepatitis C Antibody  Non-reactive  (NonReactive)   09/14/22  08:31    











Petty/IV: 


                                        





Voiding Method                   Toilet











Active Medications





- Current Medications


Current Medications: 














Generic Name Dose Route Start Last Admin





  Trade Name Freq  PRN Reason Stop Dose Admin


 


Acetaminophen  650 mg  09/14/22 02:56 





  Acetaminophen 325 Mg Tab  PO  





  Q4H PRN  





  Pain MILD(1-3)/Fever >100.5/HA  


 


Aspirin  81 mg  09/14/22 10:00  09/30/22 10:08





  Aspirin Ec 81 Mg Tab  PO   81 mg





  QDAY LASHA   Administration


 


Dextrose  50 ml  09/14/22 02:56  09/14/22 11:34





  Dextrose 50% In Water (25gm) 50 Ml Syringe  IV   50 ml





  Q30MIN PRN   Administration





  Hypoglycemia  





  Protocol  


 


Sodium Chloride  100 mls @ 999 mls/hr  09/22/22 09:00 





  Nacl 0.9%  IV  





  EUSEBIA PRN  





  Hypotension  


 


Insulin Human Lispro  0 unit  09/14/22 07:30  09/30/22 08:57





  Insulin Lispro 100 Unit/Ml  SUB-Q   Not Given





  ACHS LASHA  





  Protocol  


 


Levetiracetam  500 mg  09/14/22 10:00  09/30/22 10:08





  Levetiracetam 500 Mg Tab  PO   500 mg





  BID LASHA   Administration


 


Metoclopramide HCl  5 mg  09/28/22 08:00 





  Metoclopramide 10 Mg/2 Ml Inj  IV  





  Q6H PRN  





  Nausea And Vomiting  


 


Metoprolol Tartrate  50 mg  09/17/22 22:00  09/30/22 10:08





  Metoprolol Tartrate 50 Mg Tab  PO   50 mg





  BID LASHA   Administration


 


Morphine Sulfate  2 mg  09/14/22 02:56  09/14/22 15:13





  Morphine 2 Mg/1 Ml Inj  IV   2 mg





  Q4H PRN   Administration





  Pain, Moderate (4-6)  


 


Morphine Sulfate  4 mg  09/14/22 02:56 





  Morphine 4 Mg/1 Ml Inj  IV  





  Q4H PRN  





  Pain , Severe (7-10)  


 


Ondansetron HCl  4 mg  09/14/22 02:56 





  Ondansetron 4 Mg/2 Ml Inj  IV  





  Q8H PRN  





  Nausea And Vomiting  


 


Sevelamer Carbonate  2,400 mg  09/14/22 07:30  09/30/22 10:08





  Sevelamer Carbonate 800 Mg Tab  PO   2,400 mg





  AC LASHA   Administration


 


Sodium Chloride  10 ml  09/14/22 10:00  09/30/22 10:08





  Sodium Chloride 0.9% 10 Ml Flush Syringe  IV   10 ml





  BID LASHA   Administration


 


Sodium Chloride  10 ml  09/14/22 02:56 





  Sodium Chloride 0.9% 10 Ml Flush Syringe  IV  





  PRN PRN  





  LINE FLUSH  


 


Sodium Polystyrene Sulfonate  45 gm  09/30/22 09:00  09/30/22 10:18





  Sodium Polystyrene 15 Gm/60 Ml Oral Liqd  PO  09/30/22 12:00  Not Given





  ONCE@0900 LASHA  


 


Zolpidem Tartrate  5 mg  09/14/22 22:00  09/26/22 23:44





  Zolpidem 5 Mg Tab  PO   5 mg





  QHS PRN   Administration





  Sleep  














Nutrition/Malnutrition Assess





- Dietary Evaluation


Nutrition/Malnutrition Findings: 


                                        





Nutrition Notes                                            Start:  09/15/22 

10:48


Freq:                                                      Status: Active       




Protocol:                                                                       




 Document     09/29/22 17:40  CM  (Rec: 09/29/22 17:51  CM  [id number])


 Co-Sign      09/29/22 17:40  WW


 Nutrition Notes


     Initial or Follow up                        Reassessment


     Current Diagnosis                           CKD (stage V CKD),Hypertension


     Current Diet                                Renal Diet


     Labs/Tests                                  Reviewed


     Pertinent Medications                       Sevelamer Carbonate


     Height                                      5 ft 7 in


     Weight                                      56.6 kg


     Ideal Body Weight (kg)                      67.27


     BMI                                         19.5


     Weight change and time frame                Pt reported 20lb wt loss over


                                                 1 year with a UBW of 200lb -


                                                 38% wt loss in one year.


     Subjective/Other Information                RD follow-up per protocol.


                                                 No %PO intake recorded in ADL


                                                 notes.


                                                 Physical assessment WNL.


                                                 Pt reported consuming 50% of


                                                 meals.


                                                 Pt refused physical


                                                 examination.


                                                 Denied N/V, abdominal pain, or


                                                 chewing/swallowing difficulty


                                                 .


                                                 Noted diarrhea this morning


                                                 and BM once per day.


                                                 Recommend nutrition


                                                 supplementation.


                                                 Wt loss indicative of mild


                                                 malnutrition at this time - pt


                                                 visually at nutrition risk.


     Percent of energy/protein needs met:        Renal Diet provides 2072kcal/


                                                 77g PRO q day (115%/100%)


     Burn                                        Absent


     Trauma                                      Absent


     GI Symptoms                                 Diarrhea


     Food Allergy                                No


     Skin Integrity/Comment                      Dry skin at visit


     Current % PO                                Fair (50-74%)


     Minimum of two criteria                     No


     Interpretation of Weight Loss (severe)      > 20% in 1 year


     Body Fat Depletion                          Moderate depletion (severe)


     Muscle Mass                                 Moderate Depletion (severe)


     Fluid Accumulation                          N/A


     Reduced  Strength                       N/A (non-severe)


     Protein-Calorie Malnutrition                N\A


     #1


      Nutrition Diagnosis                        Malnutrition


      Comments:                                  Severe malnutrition in chronic


                                                 setting


      Etiology                                   ESRD on HD


      As Evidenced by Signs and Symptoms         38% wt loss over 1 year


                                                 compared to pt UBW and visual


                                                 severe muscle wasting /


                                                 subcutaneous fat loss.


     Is patient on ventilator?                   No


     Is Patient Ambulatory and/or Out of Bed     Yes


     REE-([facility]-ambulatory/OOB) [     [phone]


      [email or web address]]                               


     Calculation Used for Recommendations        [facility]


     Additional Notes                            Protein: 1.2-1.5g/kg ABW; 68-


                                                 85g PRO q day


                                                 Fluids: 1000mL + UOP


 Nutrition Intervention


     Change Diet Order:                          Continue renal diet


     Add Supplement/Snack (indicate name/kcal    Nepro BID (Vanilla)


      /protein )                                 


     Provides kCal:                              850


     Provides Protein (gm)                       38


     Goal #1                                     Pt to consume >75% of


                                                 estimated energy/protein needs


                                                 through current diet order/


                                                 supplementation.


     Follow-Up By:                               10/05/22


     Additional Comments                         Monitor %PO intake and wt


                                                 status

## 2022-09-30 NOTE — DISCHARGE SUMMARY
Providers





- Providers


Date of Admission: 


09/14/22 02:57





Date of discharge: 09/30/22


Attending physician: 


JAZMINE MITCHELL





                                        





09/14/22 01:55


Consult to Physician [CONS] Urgent 


   Comment: 


   Consulting Provider: JOSELINE HARRIS


   Physician Instructions: 


   Reason For Exam: End-stage renal disease with





09/14/22 10:01


Consult to Case Management [CONS] Routine 


   Services Needed at Discharge: Other


   Notified:: cm





09/14/22 15:43


Occupational Therapy Evaluate and Treat [CONS] Routine 


   Comment: OT eval and treat.


   Reason For Exam: debility


Physical Therapy Evaluation and Treat [CONS] Routine 


   Comment: Pt eval and treat.


   Reason For Exam: debility





09/30/22 10:45


psychiatry consult [Consult to Mental Health] [CONS] Routine 


   Reason For Exam: assess for mental capacity for decision making











Primary care physician: 


ARIES HARRINGTON








Hospitalization


Reason for admission: ESRd with missed HD


Condition: Good


Hospital course: 








51-year-old male with known history of end-stage renal disease on dialysis who 

was has not been quite compliant with his dialysis presented to the emergency 

room today with complaints of nausea and generalized weakness.  Last dialysis 

was on August 13, 2022 PTA.


He was encouraged by family members to report to the emergency room today for 

evaluation of possible dialysis.





Work-up in the emergency room revealed, labs significant for potassium level of 

6.8, BUN of 1.2 and creatinine of 20.2.


EKG was unremarkable.





Patient received insulin, glucose, Kayexalate, Toradol nebulizing treatments and

 calcium gluconate in the emergency room.


Consult has been placed to the nephrologist for evaluation.  The patient was 

admitted with diagnosis of end-stage renal disease and missed hemodialysis, 

noncompliance with hemodialysis, hyperkalemia, hypertension





Hospital course:





9/21/22;


Pending authorization for SNF placement


DC planning per case management





9/22 pending placement SNF with hospice


Case management team processing


Pending authorization





9/23; pending SNF placement





9/24; patient is refusing dialysis


Awaiting SNF/hospice placement


9/25, refused dialysis awaiting placement





9/26; patient DNR awaiting SNF/hospice placement 





9/27: Discharge planning; per case management


Possible discharge to SNF with hospice


Pending authorization





9/28:


Discharge planning; per case management


Possible discharge to SNF with hospice


Pending authorization





9/29: Case management reports patient does not want to go to a nursing home.  

Patient reportedly to return back to his apartment if the  will 

accept the patient.  Spouse reportedly is in agreement





9/30: Patient with potassium of 7.7 this morning.  Patient is refusing 

hemodialysis as well as hyperkalemic treatments insulin/glucose, Kayexalate and 

calcium gluconate.  I explained to the patient that if he does not undergo 

hemodialysis and other treatments as described that he will have sudden cardiac 

arrest and die.  Patient stated that he wanted to "sleep on it".  Nursing and 

myself placed emphasis again on the importance of treatment, patient voiced 

understanding and stated that it is "in God's hands".  Nephrology also saw the 

patient today and reports he continues to refuse dialysis.  I discussed case 

with Dr. Ceron who had a discussion with the patient and wife and now 

arrangements are made for hospice.  Nephrology reports since the patient 

continues to refuse dialysis, hospice placement is appropriate.  Case management

 arranged for hospice.  Dedicated discharge time 35 minutes


Disposition: [facility]


Final Discharge Diagnosis (Prints w/discharge instructions): ESRD, noncompliance

 with hemodialysis, hyperkalemia, hypertension, hospice placement





Core Measure Documentation





- Palliative Care


Palliative Care/ Comfort Measures: Hospice Care





- Core Measures


Any of the following diagnoses?: none





Exam





- Constitutional


Vitals: 


                                        











Temp Pulse Resp BP Pulse Ox


 


 98.0 F   90   19   137/77   98 


 


 09/30/22 07:32  09/30/22 07:32  09/30/22 07:32  09/30/22 07:32  09/30/22 10:00











General appearance: Present: no acute distress, well-nourished





- EENT


Eyes: Present: PERRL


ENT: hearing intact, clear oral mucosa





- Neck


Neck: Present: supple, normal ROM





- Respiratory


Respiratory effort: normal


Respiratory: bilateral: CTA





- Cardiovascular


Heart Sounds: Present: S1 & S2.  Absent: rub, click





- Extremities


Extremities: pulses symmetrical, No edema


Peripheral Pulses: within normal limits





- Abdominal


General gastrointestinal: Present: soft, non-tender, non-distended, normal bowel

 sounds


Male genitourinary: Present: normal





- Integumentary


Integumentary: Present: clear, warm, dry





- Musculoskeletal


Musculoskeletal: gait normal, strength equal bilaterally





- Psychiatric


Psychiatric: appropriate mood/affect, intact judgment & insight





- Neurologic


Neurologic: CNII-XII intact, moves all extremities





Plan


Activity: advance as tolerated


Weight Bearing Status: Weight Bear as Tolerated


Diet: renal


Follow up with: 


ARIES HARRINGTON MD [Primary Care Provider] - 7 Days